# Patient Record
Sex: FEMALE | Race: WHITE | NOT HISPANIC OR LATINO | Employment: OTHER | URBAN - METROPOLITAN AREA
[De-identification: names, ages, dates, MRNs, and addresses within clinical notes are randomized per-mention and may not be internally consistent; named-entity substitution may affect disease eponyms.]

---

## 2017-07-12 ENCOUNTER — APPOINTMENT (OUTPATIENT)
Dept: LAB | Facility: CLINIC | Age: 67
End: 2017-07-12
Payer: COMMERCIAL

## 2017-07-12 DIAGNOSIS — E78.5 HYPERLIPIDEMIA: ICD-10-CM

## 2017-07-12 LAB
ALBUMIN SERPL BCP-MCNC: 4 G/DL (ref 3.5–5)
ALP SERPL-CCNC: 57 U/L (ref 46–116)
ALT SERPL W P-5'-P-CCNC: 19 U/L (ref 12–78)
ANION GAP SERPL CALCULATED.3IONS-SCNC: 6 MMOL/L (ref 4–13)
AST SERPL W P-5'-P-CCNC: 25 U/L (ref 5–45)
BASOPHILS # BLD AUTO: 0.02 THOUSANDS/ΜL (ref 0–0.1)
BASOPHILS NFR BLD AUTO: 0 % (ref 0–1)
BILIRUB SERPL-MCNC: 0.61 MG/DL (ref 0.2–1)
BUN SERPL-MCNC: 14 MG/DL (ref 5–25)
CALCIUM SERPL-MCNC: 9.2 MG/DL (ref 8.3–10.1)
CHLORIDE SERPL-SCNC: 102 MMOL/L (ref 100–108)
CHOLEST SERPL-MCNC: 255 MG/DL (ref 50–200)
CO2 SERPL-SCNC: 30 MMOL/L (ref 21–32)
CREAT SERPL-MCNC: 0.76 MG/DL (ref 0.6–1.3)
EOSINOPHIL # BLD AUTO: 0.31 THOUSAND/ΜL (ref 0–0.61)
EOSINOPHIL NFR BLD AUTO: 7 % (ref 0–6)
ERYTHROCYTE [DISTWIDTH] IN BLOOD BY AUTOMATED COUNT: 13.6 % (ref 11.6–15.1)
GFR SERPL CREATININE-BSD FRML MDRD: >60 ML/MIN/1.73SQ M
GLUCOSE P FAST SERPL-MCNC: 90 MG/DL (ref 65–99)
HCT VFR BLD AUTO: 40.1 % (ref 34.8–46.1)
HDLC SERPL-MCNC: 80 MG/DL (ref 40–60)
HGB BLD-MCNC: 12.8 G/DL (ref 11.5–15.4)
LDLC SERPL CALC-MCNC: 161 MG/DL (ref 0–100)
LYMPHOCYTES # BLD AUTO: 2.05 THOUSANDS/ΜL (ref 0.6–4.47)
LYMPHOCYTES NFR BLD AUTO: 45 % (ref 14–44)
MCH RBC QN AUTO: 27.5 PG (ref 26.8–34.3)
MCHC RBC AUTO-ENTMCNC: 31.9 G/DL (ref 31.4–37.4)
MCV RBC AUTO: 86 FL (ref 82–98)
MONOCYTES # BLD AUTO: 0.42 THOUSAND/ΜL (ref 0.17–1.22)
MONOCYTES NFR BLD AUTO: 9 % (ref 4–12)
NEUTROPHILS # BLD AUTO: 1.77 THOUSANDS/ΜL (ref 1.85–7.62)
NEUTS SEG NFR BLD AUTO: 39 % (ref 43–75)
NRBC BLD AUTO-RTO: 0 /100 WBCS
PLATELET # BLD AUTO: 130 THOUSANDS/UL (ref 149–390)
PMV BLD AUTO: 10.7 FL (ref 8.9–12.7)
POTASSIUM SERPL-SCNC: 3.8 MMOL/L (ref 3.5–5.3)
PROT SERPL-MCNC: 7.4 G/DL (ref 6.4–8.2)
RBC # BLD AUTO: 4.65 MILLION/UL (ref 3.81–5.12)
SODIUM SERPL-SCNC: 138 MMOL/L (ref 136–145)
TRIGL SERPL-MCNC: 72 MG/DL
WBC # BLD AUTO: 4.57 THOUSAND/UL (ref 4.31–10.16)

## 2017-07-12 PROCEDURE — 85025 COMPLETE CBC W/AUTO DIFF WBC: CPT

## 2017-07-12 PROCEDURE — 36415 COLL VENOUS BLD VENIPUNCTURE: CPT

## 2017-07-12 PROCEDURE — 80053 COMPREHEN METABOLIC PANEL: CPT

## 2017-07-12 PROCEDURE — 80061 LIPID PANEL: CPT

## 2017-07-19 ENCOUNTER — GENERIC CONVERSION - ENCOUNTER (OUTPATIENT)
Dept: OTHER | Facility: OTHER | Age: 67
End: 2017-07-19

## 2017-07-20 ENCOUNTER — ALLSCRIPTS OFFICE VISIT (OUTPATIENT)
Dept: OTHER | Facility: OTHER | Age: 67
End: 2017-07-20

## 2017-08-03 DIAGNOSIS — M85.80 OTHER SPECIFIED DISORDERS OF BONE DENSITY AND STRUCTURE, UNSPECIFIED SITE: ICD-10-CM

## 2017-08-14 DIAGNOSIS — I10 ESSENTIAL (PRIMARY) HYPERTENSION: ICD-10-CM

## 2017-08-14 DIAGNOSIS — D69.6 THROMBOCYTOPENIA (HCC): ICD-10-CM

## 2017-08-16 ENCOUNTER — APPOINTMENT (OUTPATIENT)
Dept: LAB | Facility: CLINIC | Age: 67
End: 2017-08-16
Payer: COMMERCIAL

## 2017-08-16 DIAGNOSIS — D69.6 THROMBOCYTOPENIA (HCC): ICD-10-CM

## 2017-08-16 DIAGNOSIS — I10 ESSENTIAL (PRIMARY) HYPERTENSION: ICD-10-CM

## 2017-08-16 LAB
BASOPHILS # BLD AUTO: 0.02 THOUSANDS/ΜL (ref 0–0.1)
BASOPHILS NFR BLD AUTO: 0 % (ref 0–1)
EOSINOPHIL # BLD AUTO: 0.17 THOUSAND/ΜL (ref 0–0.61)
EOSINOPHIL NFR BLD AUTO: 3 % (ref 0–6)
ERYTHROCYTE [DISTWIDTH] IN BLOOD BY AUTOMATED COUNT: 14.1 % (ref 11.6–15.1)
HCT VFR BLD AUTO: 39.5 % (ref 34.8–46.1)
HGB BLD-MCNC: 12.9 G/DL (ref 11.5–15.4)
LYMPHOCYTES # BLD AUTO: 1.69 THOUSANDS/ΜL (ref 0.6–4.47)
LYMPHOCYTES NFR BLD AUTO: 34 % (ref 14–44)
MCH RBC QN AUTO: 28.4 PG (ref 26.8–34.3)
MCHC RBC AUTO-ENTMCNC: 32.7 G/DL (ref 31.4–37.4)
MCV RBC AUTO: 87 FL (ref 82–98)
MONOCYTES # BLD AUTO: 0.37 THOUSAND/ΜL (ref 0.17–1.22)
MONOCYTES NFR BLD AUTO: 8 % (ref 4–12)
NEUTROPHILS # BLD AUTO: 2.69 THOUSANDS/ΜL (ref 1.85–7.62)
NEUTS SEG NFR BLD AUTO: 55 % (ref 43–75)
NRBC BLD AUTO-RTO: 0 /100 WBCS
PLATELET # BLD AUTO: 159 THOUSANDS/UL (ref 149–390)
PMV BLD AUTO: 11.3 FL (ref 8.9–12.7)
RBC # BLD AUTO: 4.54 MILLION/UL (ref 3.81–5.12)
TSH SERPL DL<=0.05 MIU/L-ACNC: 1.34 UIU/ML (ref 0.36–3.74)
WBC # BLD AUTO: 4.94 THOUSAND/UL (ref 4.31–10.16)

## 2017-08-16 PROCEDURE — 87798 DETECT AGENT NOS DNA AMP: CPT

## 2017-08-16 PROCEDURE — 86618 LYME DISEASE ANTIBODY: CPT

## 2017-08-16 PROCEDURE — 84443 ASSAY THYROID STIM HORMONE: CPT

## 2017-08-16 PROCEDURE — 85025 COMPLETE CBC W/AUTO DIFF WBC: CPT

## 2017-08-16 PROCEDURE — 36415 COLL VENOUS BLD VENIPUNCTURE: CPT

## 2017-08-18 LAB
B BURGDOR IGG SER IA-ACNC: 0.78
B BURGDOR IGM SER IA-ACNC: 0.56

## 2017-08-21 LAB — A PHAGOCYTOPH DNA BLD QL NAA+PROBE: NEGATIVE

## 2017-08-29 ENCOUNTER — ALLSCRIPTS OFFICE VISIT (OUTPATIENT)
Dept: OTHER | Facility: OTHER | Age: 67
End: 2017-08-29

## 2017-09-05 ENCOUNTER — HOSPITAL ENCOUNTER (OUTPATIENT)
Dept: MAMMOGRAPHY | Facility: CLINIC | Age: 67
Discharge: HOME | End: 2017-09-05
Payer: COMMERCIAL

## 2017-09-05 DIAGNOSIS — M85.80 OTHER SPECIFIED DISORDERS OF BONE DENSITY AND STRUCTURE, UNSPECIFIED SITE: ICD-10-CM

## 2017-09-05 PROCEDURE — 77080 DXA BONE DENSITY AXIAL: CPT

## 2017-10-24 NOTE — PROGRESS NOTES
Assessment  1  Hypertension (401 9) (I10)   2  History of thrombocytopenia (V12 3) (Z86 2)   3  Hyperlipidemia (272 4) (E78 5)    Plan  Hyperlipidemia    · (1) LIPID PANEL, FASTING; Status:Active; Requested for:01Feb2018;   Hypertension    · (1) BASIC METABOLIC PROFILE; Status:Active; Requested for:01Feb2018;    · (1) CBC/PLT/DIFF; Status:Active; Requested for:01Feb2018;    · (1) HEPATIC FUNCTION PANEL; Status:Active; Requested for:01Feb2018; Discussion/Summary  Discussion Summary:   HTN- Home bp monitor bp's 158/85,  142/82, 121/81 home blood pressure monitor appears to correlate well with office blood pressures and there appears to be some degree of whitecoat hypertension  At this point I think her blood pressures are stable without medication but patient is encouraged to continue with excellent lifestyle with exercise and ideal body weight and monitor home blood pressures and contact me if more than 25% are greater than goal of 140/90  discussed again the current guidelines and given the more optimal blood pressures since last visit I do not feel compelled to treat  We will follow both of these issues up after labs again in 6 months  has resolved, unclear etiology, CBC is completely normal and Lyme and Anaplasma serologies negative, no further workup indicated, we'll repeat the CBC again to assure stability in 6 months  Chief Complaint  Chief Complaint Free Text Note Form: f/u htn      History of Present Illness  HPI: Home bp's 90% at goal, 2 elevated 145/79, 143/81  5d per week w/ 30-45 min routine and 3 walks 2-3 mi ea  Active Problems  1  Advanced directives, counseling/discussion (V65 49) (Z71 89)   2  Changing skin lesion (709 9) (L98 9)   3  Encounter for screening colonoscopy (V76 51) (Z12 11)   4  Encounter for screening for osteoporosis (V82 81) (Z13 820)   5  Hyperlipidemia (272 4) (E78 5)   6  Hypertension (401 9) (I10)   7  Microhematuria (599 72) (R31 29)   8   Need for pneumococcal vaccine (V03 82) (Z23)   9  No advance directives (V49 89) (Z78 9)   10  Osteopenia (733 90) (M85 80)   11  Plantar fasciitis (728 71) (M72 2)   12  Renal cyst (753 10) (N28 1)   13  Screening for cardiovascular condition (V81 2) (Z13 6)   14  Thyroid disorder (246 9) (E07 9)    Past Medical History  1  History of hematuria (V13 09) (Z87 448)   2  History of malignant neoplasm of breast (V10 3) (Z85 3)    Surgical History  1  History of Breast Surgery Lumpectomy   2  History of Excisional Lymph Node Biopsy   3  History of Shaving Of Lesion Mohs' Technique    Family History  Mother    1  Family history of malignant neoplasm of breast (V16 3) (Z80 3)   2  Family history of tuberculosis (V18 8) (Z83 1)  Father    3  Family history of diabetes mellitus (V18 0) (Z83 3)    Social History   · Alcohol use   · Never a smoker   · No advance directives (V49 89) (Z78 9)   · No drug use    Current Meds   1  Calcium 500 MG TABS; Therapy: (Recorded:43Iue3159) to Recorded   2  Multi For Her Oral Capsule; Therapy: (Dean Cutter) to Recorded   3  Progesterone Micronized 10 % Transdermal Cream;   Therapy: (Recorded:61Wiy9244) to Recorded    Allergies  1  No Known Drug Allergies    Vitals  Vital Signs    ** Printed in Appendix #1 below  Physical Exam    Constitutional   General appearance: No acute distress, well appearing and well nourished  Eyes   Conjunctiva and lids: No swelling, erythema or discharge  Pupils and irises: Equal, round and reactive to light  Ears, Nose, Mouth, and Throat   External inspection of ears and nose: Normal     Oropharynx: Normal with no erythema, edema, exudate or lesions  Pulmonary   Respiratory effort: No increased work of breathing or signs of respiratory distress  Auscultation of lungs: Clear to auscultation  Cardiovascular   Auscultation of heart: Normal rate and rhythm, normal S1 and S2, without murmurs      Examination of extremities for edema and/or varicosities: Normal     Carotid pulses: Normal     Neurologic   Cranial nerves: Cranial nerves 2-12 intact  Psychiatric   Orientation to person, place, and time: Normal     Mood and affect: Normal          Results/Data  PHQ-2 Adult Depression Screening 87Nqn1546 03:25PM User, [employer]     Test Name Result Flag Reference   PHQ-2 Adult Depression Score 0     Over the last two weeks, how often have you been bothered by any of the following problems? Little interest or pleasure in doing things: Not at all - 0  Feeling down, depressed, or hopeless: Not at all - 0   PHQ-2 Adult Depression Screening Negative       Falls Risk Assessment (Dx Z13 89 Screen for Neurologic Disorder) 32Cdf9990 03:24PM User, [employer]     Test Name Result Flag Reference   Falls Risk      No falls in the past year     (1) TSH WITH FT4 REFLEX 16Aug2017 11:17AM Dion Asimpatience Pettynicole    Order Number: [id number]     Test Name Result Flag Reference   TSH 1 340 uIU/mL  0 358-3 740   Patients undergoing fluorescein dye angiography may retain small amounts of fluorescein in the body for 48-72 hours post procedure  Samples containing fluorescein can produce falsely depressed TSH values  If the patient had this procedure,a specimen should be resubmitted post fluorescein clearance  The recommended reference ranges for TSH during pregnancy are as follows:  First trimester 0 1 to 2 5 uIU/mL  Second trimester  0 2 to 3 0 uIU/mL  Third trimester 0 3 to 3 0 uIU/m     (1) CBC/PLT/DIFF [id number] 11:17AM [facility] Number: [id number]     Test Name Result Flag Reference   WBC COUNT 4 94 Thousand/uL  4 31-10 16   RBC COUNT 4 54 Million/uL  3 81-5 12   HEMOGLOBIN 12 9 g/dL  11 5-15 4   HEMATOCRIT 39 5 %  34 8-46  1   MCV 87 fL  82-98   MCH 28 4 pg  26 8-34 3   MCHC 32 7 g/dL  31 4-37 4   RDW 14 1 %  11 6-15 1   MPV 11 3 fL  8 9-12 7   PLATELET COUNT 769 Thousands/uL  149-390   nRBC AUTOMATED 0 /100 WBCs     NEUTROPHILS RELATIVE PERCENT 55 %  43-75 LYMPHOCYTES RELATIVE PERCENT 34 %  14-44   MONOCYTES RELATIVE PERCENT 8 %  4-12   EOSINOPHILS RELATIVE PERCENT 3 %  0-6   BASOPHILS RELATIVE PERCENT 0 %  0-1   NEUTROPHILS ABSOLUTE COUNT 2 69 Thousands/? ??L  1 85-7 62   LYMPHOCYTES ABSOLUTE COUNT 1 69 Thousands/? ??L  0 60-4 47   MONOCYTES ABSOLUTE COUNT 0 37 Thousand/? ??L  0 17-1 22   EOSINOPHILS ABSOLUTE COUNT 0 17 Thousand/? ??L  0 00-0 61   BASOPHILS ABSOLUTE COUNT 0 02 Thousands/? ??L  0 00-0 10     (1) LYME ANTIBODY PROFILE W/REFLEX TO WESTERN BLOT [id number] 11:17AM Claudette Ashley     Test Name Result Flag Reference   LYME IGG 0 78  0 00-0 79   NEGATIVE(0 00-0 79)-Absence of detectable Borrelia IgG Antibodies  A negative result does not exclude the possibility of Borrelia infection  If early Lyme disease is suspected,a second sample should be collected & tested 4 weeks after initial testing  LYME IGM 0 56  0 00-0 79   NEGATIVE (0 00-0 79)-Absence of detectable Borrelia IgM antibodies  A negative result does not exclude the possibility of Borrelia infection  If early lyme disease is suspected, a second sample should be collected & tested 4 weeks after initial testing  (1) ANAPLASMA PHAGOCYTOPHILUM, PCR [id number] 11:17AM Meir Ashley Order Number: [id number]     Test Name Result Flag Reference   APHAG Negative  Negative   No Anaplasma phagocytophilum DNA detected  A  phagocytophilum has been  characterized as the causative agent of Human Granulocytic  Ehrlichiosis (HGE)  This test was developed and its performance characteristics  determined by [facility]  It has not been cleared or approved  by the Food and Drug Administration      Performed at:  [address]  [id number]  : [place], Phone:  [phone]     Signatures   Electronically signed by : BEVERLY Mendez ; Aug 29 2017  4:01PM EST                       (Author)    Appendix #1     Vital Signs   Patient: Humberto Feliciano CHANCE; : 1950; MRN: [id number]      Recorded: 2017 03:50PM Recorded: 2017 03:45PM Recorded: 2017 03:23PM   Heart Rate   68   Respiration   12   Systolic 025 472 532   Diastolic 80 94 86   Height   5 ft 4 in   Weight   115 lb 8 oz   BMI Calculated   19 83   BSA Calculated   1 55

## 2017-12-07 ENCOUNTER — HOSPITAL ENCOUNTER (OUTPATIENT)
Dept: ULTRASOUND IMAGING | Facility: CLINIC | Age: 67
Discharge: HOME | End: 2017-12-07
Payer: COMMERCIAL

## 2017-12-07 ENCOUNTER — GENERIC CONVERSION - ENCOUNTER (OUTPATIENT)
Dept: OTHER | Facility: OTHER | Age: 67
End: 2017-12-07

## 2017-12-07 DIAGNOSIS — R31.29 MICROSCOPIC HEMATURIA: ICD-10-CM

## 2017-12-07 PROCEDURE — 76770 US EXAM ABDO BACK WALL COMP: CPT

## 2018-01-11 ENCOUNTER — TRANSCRIBE ORDERS (OUTPATIENT)
Dept: ADMINISTRATIVE | Facility: HOSPITAL | Age: 68
End: 2018-01-11

## 2018-01-11 ENCOUNTER — ALLSCRIPTS OFFICE VISIT (OUTPATIENT)
Dept: OTHER | Facility: OTHER | Age: 68
End: 2018-01-11

## 2018-01-11 DIAGNOSIS — N28.1 ACQUIRED CYST OF KIDNEY: Primary | ICD-10-CM

## 2018-01-12 NOTE — PROGRESS NOTES
Assessment   1  Microhematuria (599 72) (R31 29)   2  Renal cyst (753 10) (N28 1)    Plan   Renal cyst    · Follow-up visit in 6 months Evaluation and Treatment  Follow-up  Status: Complete     Done: [id number]   Ordered; For: Renal cyst; Ordered By: Guicho Rudolph Performed:  Due: 20DJQ7338   · [facility]; Status:Active; Requested for:60Sza1474 10:00AM;    Perform:[facility]; CK:30UKR4784;EQTDVNA; For:Renal cyst; Ordered By:Juana Potts; Discussion/Summary   Discussion Summary:    1  Microhematuria s/p negative workup (12/2016) â managed by Dr Daphne Dee no further workup required at this time  Bosniak 2F renal cyst reviewed with the patient has been slight progression in size  We will continue to observe with a repeat ultrasound of the kidney and bladder in 6 months  If continues to increase in size will need to repeat contrasted CT for re-evaluation  all questions answered  Chief Complaint   Chief Complaint Free Text Note Form: Renal Cyst      History of Present Illness   HPI: Shanna Bang is a 68-year-old female patient of Dr Daphne Dee with a history of microscopic hematuria and left renal cyst presenting for follow-up    had been found to have microscopic hematuria on urinalysis  Renal ultrasound was obtained which revealed a 1 3 cm simple appearing renal cysts  Patient had a CT scan which showed 2 small renal cysts, one simple the other has a thin septations classifying it as a Bosniak 2F  The cystoscopy was performed 12/22/16 which was negative for any abnormalities  presents today for 1 year follow-up with an ultrasound of the kidney and bladder prior to visit  She did have interval mild increase in the size of the left interpolar cyst    denies any flank pain  She does have some lower back pain with radiation into her leg consistent with sciatica  She denies any nausea, vomiting, dysuria, gross hematuria, or weight loss  Patient is overall happy with her urination  is a lifelong nonsmoker  She does have a history of organ confined prostate cancer status post lumpectomy and axillary lymph node dissection in 1993 at [facility]  Review of Systems   Complete-Female Urology:      Constitutional: No fever, no chills, feels well, no tiredness, no recent weight gain or weight loss  Respiratory: No complaints of shortness of breath, no wheezing, no cough, no SOB on exertion, no orthopnea, no PND  Cardiovascular: No complaints of slow heart rate, no fast heart rate, no chest pain, no palpitations, no leg claudication, no lower extremity edema  Gastrointestinal: No complaints of abdominal pain, no constipation, no nausea or vomiting, no diarrhea, no bloody stools  Genitourinary: Empty sensation,-- incontinence-- and-- stream quality good, but-- no dysuria,-- no urinary hesitancy,-- no feelings of urinary urgency-- and-- no hematuria--       The patient presents with complaints of nocturia (1x)  Musculoskeletal: No complaints of arthralgias, no myalgias, no joint swelling or stiffness, no limb pain or swelling  Integumentary: No complaints of skin rash or lesions, no itching, no skin wounds, no breast pain or lump  Hematologic/Lymphatic: No complaints of swollen glands, no swollen glands in the neck, does not bleed easily, does not bruise easily  Neurological: No complaints of headache, no confusion, no convulsions, no numbness, no dizziness or fainting, no tingling, no limb weakness, no difficulty walking  ROS Reviewed:    ROS reviewed  Active Problems   1  Advanced directives, counseling/discussion (V65 49) (Z71 89)   2  Changing skin lesion (709 9) (L98 9)   3  Encounter for screening colonoscopy (V76 51) (Z12 11)   4  Encounter for screening for osteoporosis (V82 81) (Z13 820)   5  Hyperlipidemia (272 4) (E78 5)   6  Hypertension (401 9) (I10)   7  Microhematuria (599 72) (R31 29)   8   Need for pneumococcal vaccine (V03 82) (Z23)   9  No advance directives (V49 89) (Z78 9)   10  Osteopenia (733 90) (M85 80)   11  Plantar fasciitis (728 71) (M72 2)   12  Renal cyst (753 10) (N28 1)   13  Screening for cardiovascular condition (V81 2) (Z13 6)   14  Thyroid disorder (246 9) (E07 9)    Past Medical History   1  History of hematuria (V13 09) (Z87 448)   2  History of malignant neoplasm of breast (V10 3) (Z85 3)  Active Problems And Past Medical History Reviewed: The active problems and past medical history were reviewed and updated today  Surgical History   1  History of Breast Surgery Lumpectomy   2  History of Excisional Lymph Node Biopsy   3  History of Shaving Of Lesion Mohs' Technique  Surgical History Reviewed: The surgical history was reviewed and updated today  Family History   Mother    1  Family history of malignant neoplasm of breast (V16 3) (Z80 3)   2  Family history of tuberculosis (V18 8) (Z83 1)  Father    3  Family history of diabetes mellitus (V18 0) (Z83 3)  Family History Reviewed: The family history was reviewed and updated today  Social History    · Alcohol use   · Never a smoker   · No advance directives (V49 89) (Z78 9)   · No drug use  Social History Reviewed: The social history was reviewed and is unchanged  Current Meds    1  Calcium 500 MG TABS; Therapy: (Recorded:96Ndk1837) to Recorded   2  Multi For Her Oral Capsule; Therapy: (Birtha Marlen) to Recorded   3  Progesterone Micronized 10 % Transdermal Cream;     Therapy: (Recorded:27Ayp5402) to Recorded  Medication List Reviewed: The medication list was reviewed and updated today  Allergies   1   No Known Drug Allergies    Vitals   Vital Signs    Recorded: 71OIQ8987 09:24AM   Heart Rate 68   Systolic 625   Diastolic 80   Height 5 ft 4 in   Weight 119 lb    BMI Calculated 20 43   BSA Calculated 1 57     Physical Exam        Constitutional      General appearance: No acute distress, well appearing and well nourished  Pulmonary      Respiratory effort: No increased work of breathing or signs of respiratory distress  Cardiovascular      Examination of extremities for edema and/or varicosities: Normal        Abdomen no CVA tenderness  Musculoskeletal      Gait and station: Normal        Skin      Skin and subcutaneous tissue: Normal without rashes or lesions  Additional Exam:  no focal neurologic deficits  Mood and affect appropriate  Results/Data   US KIDNEY AND BLADDER 56BCU3393 08:52AM Chevis Dire      Test Name Result Flag Reference   US KIDNEY AND BLADDER (Report)     RENAL ULTRASOUND           INDICATION: 23-year-old female for follow-up  Patient has diagnosis of microscopic hematuria  COMPARISON: None  TECHNIQUE:  Ultrasound of the retroperitoneum was performed with a curvilinear transducer utilizing volumetric sweeps and still imaging techniques  FINDINGS:           KIDNEYS:      Symmetric and normal size  Right kidney: 10 3 x 3 4 cm with cortical thickness of 0 8 cm  Normal echogenicity and contour  No suspicious masses detected  No hydronephrosis  No shadowing calculi  No perinephric fluid collections  Left kidney: 10 x 4 cm with cortical thickness of 0 9 cm  Normal echogenicity and contour  No suspicious masses detected  Anechoic nonvascular 1 4 x 1 5 x 1 4 cm interpolar cyst       No hydronephrosis  No shadowing calculi  No perinephric fluid collections  URETERS:      Nonvisualized  BLADDER:       Normally distended  No focal thickening or mass lesions  Ureteral jets are not identified  IMPRESSION:           No acute abnormality   Interval mild increase in size of left interpolar cyst                  Workstation performed: [id number]           Signed by:      Owen Dempsey MD      12/9/17      Future Appointments      Date/Time Provider Specialty Site   02/13/2018 09:30 AM BEVERLY Giles   Internal Medicine [facility]     Signatures    Electronically signed by : Griselda Araujo, ; Jan 11 2018 12:44PM EST                       (Author)     Electronically signed by : Pb Newton MD; Jan 11 2018  1:00PM EST                       (Author)

## 2018-01-14 VITALS
HEIGHT: 64 IN | HEART RATE: 68 BPM | WEIGHT: 117 LBS | DIASTOLIC BLOOD PRESSURE: 90 MMHG | OXYGEN SATURATION: 98 % | SYSTOLIC BLOOD PRESSURE: 150 MMHG | BODY MASS INDEX: 19.97 KG/M2

## 2018-01-14 VITALS
BODY MASS INDEX: 19.72 KG/M2 | WEIGHT: 115.5 LBS | RESPIRATION RATE: 12 BRPM | SYSTOLIC BLOOD PRESSURE: 122 MMHG | HEART RATE: 68 BPM | DIASTOLIC BLOOD PRESSURE: 80 MMHG | HEIGHT: 64 IN

## 2018-01-23 VITALS
SYSTOLIC BLOOD PRESSURE: 136 MMHG | HEIGHT: 64 IN | DIASTOLIC BLOOD PRESSURE: 80 MMHG | WEIGHT: 119 LBS | BODY MASS INDEX: 20.32 KG/M2 | HEART RATE: 68 BPM

## 2018-02-01 DIAGNOSIS — I10 ESSENTIAL (PRIMARY) HYPERTENSION: ICD-10-CM

## 2018-02-01 DIAGNOSIS — E78.5 HYPERLIPIDEMIA: ICD-10-CM

## 2018-02-07 ENCOUNTER — APPOINTMENT (OUTPATIENT)
Dept: LAB | Facility: CLINIC | Age: 68
End: 2018-02-07
Payer: COMMERCIAL

## 2018-02-07 DIAGNOSIS — E78.5 HYPERLIPIDEMIA: ICD-10-CM

## 2018-02-07 DIAGNOSIS — I10 ESSENTIAL (PRIMARY) HYPERTENSION: ICD-10-CM

## 2018-02-07 LAB
ALBUMIN SERPL BCP-MCNC: 3.8 G/DL (ref 3.5–5)
ALP SERPL-CCNC: 59 U/L (ref 46–116)
ALT SERPL W P-5'-P-CCNC: 19 U/L (ref 12–78)
ANION GAP SERPL CALCULATED.3IONS-SCNC: 3 MMOL/L (ref 4–13)
AST SERPL W P-5'-P-CCNC: 24 U/L (ref 5–45)
BASOPHILS # BLD AUTO: 0.02 THOUSANDS/ΜL (ref 0–0.1)
BASOPHILS NFR BLD AUTO: 0 % (ref 0–1)
BILIRUB DIRECT SERPL-MCNC: 0.1 MG/DL (ref 0–0.2)
BILIRUB SERPL-MCNC: 0.38 MG/DL (ref 0.2–1)
BUN SERPL-MCNC: 10 MG/DL (ref 5–25)
CALCIUM SERPL-MCNC: 9 MG/DL (ref 8.3–10.1)
CHLORIDE SERPL-SCNC: 102 MMOL/L (ref 100–108)
CHOLEST SERPL-MCNC: 243 MG/DL (ref 50–200)
CO2 SERPL-SCNC: 33 MMOL/L (ref 21–32)
CREAT SERPL-MCNC: 0.63 MG/DL (ref 0.6–1.3)
EOSINOPHIL # BLD AUTO: 0.17 THOUSAND/ΜL (ref 0–0.61)
EOSINOPHIL NFR BLD AUTO: 4 % (ref 0–6)
ERYTHROCYTE [DISTWIDTH] IN BLOOD BY AUTOMATED COUNT: 13.2 % (ref 11.6–15.1)
GFR SERPL CREATININE-BSD FRML MDRD: 92 ML/MIN/1.73SQ M
GLUCOSE P FAST SERPL-MCNC: 100 MG/DL (ref 65–99)
HCT VFR BLD AUTO: 40.4 % (ref 34.8–46.1)
HDLC SERPL-MCNC: 78 MG/DL (ref 40–60)
HGB BLD-MCNC: 13.3 G/DL (ref 11.5–15.4)
LDLC SERPL CALC-MCNC: 149 MG/DL (ref 0–100)
LYMPHOCYTES # BLD AUTO: 2.12 THOUSANDS/ΜL (ref 0.6–4.47)
LYMPHOCYTES NFR BLD AUTO: 46 % (ref 14–44)
MCH RBC QN AUTO: 28.7 PG (ref 26.8–34.3)
MCHC RBC AUTO-ENTMCNC: 32.9 G/DL (ref 31.4–37.4)
MCV RBC AUTO: 87 FL (ref 82–98)
MONOCYTES # BLD AUTO: 0.39 THOUSAND/ΜL (ref 0.17–1.22)
MONOCYTES NFR BLD AUTO: 9 % (ref 4–12)
NEUTROPHILS # BLD AUTO: 1.86 THOUSANDS/ΜL (ref 1.85–7.62)
NEUTS SEG NFR BLD AUTO: 41 % (ref 43–75)
NRBC BLD AUTO-RTO: 0 /100 WBCS
PLATELET # BLD AUTO: 184 THOUSANDS/UL (ref 149–390)
PMV BLD AUTO: 11 FL (ref 8.9–12.7)
POTASSIUM SERPL-SCNC: 3.6 MMOL/L (ref 3.5–5.3)
PROT SERPL-MCNC: 7.4 G/DL (ref 6.4–8.2)
RBC # BLD AUTO: 4.63 MILLION/UL (ref 3.81–5.12)
SODIUM SERPL-SCNC: 138 MMOL/L (ref 136–145)
TRIGL SERPL-MCNC: 81 MG/DL
WBC # BLD AUTO: 4.56 THOUSAND/UL (ref 4.31–10.16)

## 2018-02-07 PROCEDURE — 80048 BASIC METABOLIC PNL TOTAL CA: CPT

## 2018-02-07 PROCEDURE — 36415 COLL VENOUS BLD VENIPUNCTURE: CPT

## 2018-02-07 PROCEDURE — 80076 HEPATIC FUNCTION PANEL: CPT

## 2018-02-07 PROCEDURE — 80061 LIPID PANEL: CPT

## 2018-02-07 PROCEDURE — 85025 COMPLETE CBC W/AUTO DIFF WBC: CPT

## 2018-02-15 ENCOUNTER — OFFICE VISIT (OUTPATIENT)
Dept: INTERNAL MEDICINE CLINIC | Facility: CLINIC | Age: 68
End: 2018-02-15
Payer: COMMERCIAL

## 2018-02-15 VITALS
HEIGHT: 64 IN | HEART RATE: 70 BPM | DIASTOLIC BLOOD PRESSURE: 84 MMHG | WEIGHT: 115.6 LBS | OXYGEN SATURATION: 98 % | SYSTOLIC BLOOD PRESSURE: 128 MMHG | BODY MASS INDEX: 19.74 KG/M2

## 2018-02-15 DIAGNOSIS — Z85.3 PERSONAL HISTORY OF BREAST CANCER: ICD-10-CM

## 2018-02-15 DIAGNOSIS — M85.80 OSTEOPENIA, UNSPECIFIED LOCATION: Primary | ICD-10-CM

## 2018-02-15 DIAGNOSIS — E78.5 HYPERLIPIDEMIA, UNSPECIFIED HYPERLIPIDEMIA TYPE: ICD-10-CM

## 2018-02-15 DIAGNOSIS — I10 HYPERTENSION, UNSPECIFIED TYPE: ICD-10-CM

## 2018-02-15 DIAGNOSIS — R31.29 MICROHEMATURIA: ICD-10-CM

## 2018-02-15 DIAGNOSIS — M54.31 SCIATICA OF RIGHT SIDE: ICD-10-CM

## 2018-02-15 PROCEDURE — 3725F SCREEN DEPRESSION PERFORMED: CPT | Performed by: INTERNAL MEDICINE

## 2018-02-15 PROCEDURE — 99214 OFFICE O/P EST MOD 30 MIN: CPT | Performed by: INTERNAL MEDICINE

## 2018-02-15 PROCEDURE — 3074F SYST BP LT 130 MM HG: CPT | Performed by: INTERNAL MEDICINE

## 2018-02-15 PROCEDURE — 1101F PT FALLS ASSESS-DOCD LE1/YR: CPT | Performed by: INTERNAL MEDICINE

## 2018-02-15 PROCEDURE — 3079F DIAST BP 80-89 MM HG: CPT | Performed by: INTERNAL MEDICINE

## 2018-02-15 RX ORDER — MULTIVITAMIN
TABLET ORAL
COMMUNITY
End: 2021-06-04 | Stop reason: HOSPADM

## 2018-02-15 RX ORDER — CALCIUM CARBONATE 500(1250)
TABLET ORAL
COMMUNITY

## 2018-02-15 NOTE — PATIENT INSTRUCTIONS
Lab data reviewed in detail and compared prior     Blood pressure remained stable without medication, patient encouraged to monitor home blood pressures more frequently, notify me if consistently running greater than 140/90, take blood pressures as directed, after resting 3-5 minutes test 3 times 1 minutes apart  Hyperlipidemia -10 year atherosclerotic risk calculated at 9 0 1%, we discussed available guidelines, will hold off on statin treatment at this time, continue with lifestyle modification    Renal cysts and history of microscopic hematuria following with Urology     recent sciatica has resolved -monitor symptoms, contact me for recurrence that fails conservative measures      Osteopenia- bone density from September reviewed, follow-up in 2-3 years, no indication for pharmacotherapy other than calcium and vitamin-D supplementation as discussed    Routine follow-up after labs in 6 months, sooner as needed

## 2018-02-15 NOTE — PROGRESS NOTES
Assessment/Plan:    No problem-specific Assessment & Plan notes found for this encounter  Diagnoses and all orders for this visit:    Osteopenia, unspecified location    Hypertension, unspecified type  -     CBC; Future  -     Comprehensive metabolic panel; Future    Microhematuria    Hyperlipidemia, unspecified hyperlipidemia type  -     Lipid panel; Future  -     TSH, 3rd generation with T4 reflex; Future    Personal history of breast cancer    Sciatica of right side    Other orders  -     Multiple Vitamin (MULTI-VITAMIN DAILY) TABS; Take by mouth  -     Progesterone Micronized 10 % CREA; Place on the skin  -     Calcium 500 MG tablet; Take by mouth        Patient Instructions   Lab data reviewed in detail and compared prior     Blood pressure remained stable without medication, patient encouraged to monitor home blood pressures more frequently, notify me if consistently running greater than 140/90, take blood pressures as directed, after resting 3-5 minutes test 3 times 1 minutes apart  Hyperlipidemia -10 year atherosclerotic risk calculated at 9 0 1%, we discussed available guidelines, will hold off on statin treatment at this time, continue with lifestyle modification    Renal cysts and history of microscopic hematuria following with Urology     recent sciatica has resolved -monitor symptoms, contact me for recurrence that fails conservative measures  Osteopenia- bone density from September reviewed, follow-up in 2-3 years, no indication for pharmacotherapy other than calcium and vitamin-D supplementation as discussed    Routine follow-up after labs in 6 months, sooner as needed      Subjective: f/u mmp and review labs     Patient ID: Randa Jimenez is a 75 y o  female  Feeling generally well, but upset b/c neighbor commit suicide last night    Had episode of R sided sciatica in Dec, pain down R ant thigh, resolved completely after 2 wks, but had residual r leg weakness for a few weeks that has now normalized  She was unable to lift leg to climb stairs  There was no injury or known precipitating activity  1 episode prior  Intermittent tinnitis in R ear come and goes every few mos  No hearing loss or dizzyness  Exercising sporadically d/t weather and sciatica  HTN-no home bp's recently    HPL-watching diet closely  Hypertension   Pertinent negatives include no chest pain, headaches, palpitations or shortness of breath  The following portions of the patient's history were reviewed and updated as appropriate: allergies, current medications, past family history, past medical history, past social history, past surgical history and problem list     Review of Systems   Constitutional: Negative for appetite change, chills, diaphoresis, fatigue, fever and unexpected weight change  HENT: Negative for congestion, hearing loss and rhinorrhea  Eyes: Negative for visual disturbance  Respiratory: Negative for cough, chest tightness, shortness of breath and wheezing  Cardiovascular: Negative for chest pain, palpitations and leg swelling  Gastrointestinal: Negative for abdominal pain and blood in stool  Endocrine: Negative for cold intolerance, heat intolerance, polydipsia and polyuria  Genitourinary: Negative for difficulty urinating, dysuria, frequency and urgency  Musculoskeletal: Negative for arthralgias and myalgias  Skin: Negative for rash  Neurological: Negative for dizziness, weakness, light-headedness and headaches  Hematological: Does not bruise/bleed easily  Psychiatric/Behavioral: Negative for dysphoric mood and sleep disturbance  Objective:    Vitals:    02/15/18 1311   BP: 128/84   Pulse:    SpO2:         Physical Exam   Constitutional: She is oriented to person, place, and time  She appears well-developed and well-nourished  HENT:   Head: Normocephalic and atraumatic     Nose: Nose normal    Mouth/Throat: Oropharynx is clear and moist    Eyes: Conjunctivae and EOM are normal  Pupils are equal, round, and reactive to light  No scleral icterus  Neck: Normal range of motion  Neck supple  No JVD present  No tracheal deviation present  No thyromegaly present  Cardiovascular: Normal rate, regular rhythm and intact distal pulses  Exam reveals no gallop and no friction rub  No murmur heard  Pulmonary/Chest: Effort normal and breath sounds normal  No respiratory distress  She has no wheezes  She has no rales  Abdominal: Soft  Bowel sounds are normal    Musculoskeletal: She exhibits no edema or tenderness  Lymphadenopathy:     She has no cervical adenopathy  Neurological: She is alert and oriented to person, place, and time  No cranial nerve deficit  Skin: Skin is warm and dry  No rash noted  No erythema  Psychiatric: She has a normal mood and affect   Her behavior is normal  Judgment and thought content normal

## 2018-07-03 ENCOUNTER — TRANSCRIBE ORDERS (OUTPATIENT)
Dept: RADIOLOGY | Facility: CLINIC | Age: 68
End: 2018-07-03

## 2018-07-10 ENCOUNTER — HOSPITAL ENCOUNTER (OUTPATIENT)
Dept: ULTRASOUND IMAGING | Facility: CLINIC | Age: 68
Discharge: HOME | End: 2018-07-10
Payer: COMMERCIAL

## 2018-07-10 DIAGNOSIS — N28.1 ACQUIRED CYST OF KIDNEY: ICD-10-CM

## 2018-07-10 PROCEDURE — 76770 US EXAM ABDO BACK WALL COMP: CPT

## 2018-07-18 ENCOUNTER — OFFICE VISIT (OUTPATIENT)
Dept: UROLOGY | Facility: CLINIC | Age: 68
End: 2018-07-18
Payer: COMMERCIAL

## 2018-07-18 VITALS
DIASTOLIC BLOOD PRESSURE: 80 MMHG | WEIGHT: 112 LBS | HEIGHT: 60 IN | HEART RATE: 68 BPM | SYSTOLIC BLOOD PRESSURE: 126 MMHG | BODY MASS INDEX: 21.99 KG/M2

## 2018-07-18 DIAGNOSIS — R31.29 MICROHEMATURIA: Primary | ICD-10-CM

## 2018-07-18 DIAGNOSIS — N28.1 RENAL CYST: ICD-10-CM

## 2018-07-18 LAB
BACTERIA UR QL AUTO: NORMAL /HPF
BILIRUB UR QL STRIP: NEGATIVE
CLARITY UR: CLEAR
COLOR UR: YELLOW
GLUCOSE UR STRIP-MCNC: NEGATIVE MG/DL
HGB UR QL STRIP.AUTO: NEGATIVE
HYALINE CASTS #/AREA URNS LPF: NORMAL /LPF
KETONES UR STRIP-MCNC: NEGATIVE MG/DL
LEUKOCYTE ESTERASE UR QL STRIP: NEGATIVE
NITRITE UR QL STRIP: NEGATIVE
NON-SQ EPI CELLS URNS QL MICRO: NORMAL /HPF
PH UR STRIP.AUTO: 7.5 [PH] (ref 4.5–8)
PROT UR STRIP-MCNC: NEGATIVE MG/DL
RBC #/AREA URNS AUTO: NORMAL /HPF
SL AMB  POCT GLUCOSE, UA: NORMAL
SL AMB LEUKOCYTE ESTERASE,UA: NORMAL
SL AMB POCT BILIRUBIN,UA: NORMAL
SL AMB POCT BLOOD,UA: NORMAL
SL AMB POCT CLARITY,UA: CLEAR
SL AMB POCT COLOR,UA: YELLOW
SL AMB POCT KETONES,UA: NORMAL
SL AMB POCT NITRITE,UA: NORMAL
SL AMB POCT PH,UA: 6
SL AMB POCT SPECIFIC GRAVITY,UA: 1.01
SL AMB POCT URINE PROTEIN: NORMAL
SL AMB POCT UROBILINOGEN: NORMAL
SP GR UR STRIP.AUTO: 1.01 (ref 1–1.03)
UROBILINOGEN UR QL STRIP.AUTO: 0.2 E.U./DL
WBC #/AREA URNS AUTO: NORMAL /HPF

## 2018-07-18 PROCEDURE — 99213 OFFICE O/P EST LOW 20 MIN: CPT | Performed by: PHYSICIAN ASSISTANT

## 2018-07-18 PROCEDURE — 81001 URINALYSIS AUTO W/SCOPE: CPT | Performed by: PHYSICIAN ASSISTANT

## 2018-07-18 PROCEDURE — 81002 URINALYSIS NONAUTO W/O SCOPE: CPT | Performed by: PHYSICIAN ASSISTANT

## 2018-07-18 NOTE — PROGRESS NOTES
1  Microhematuria  POCT urine dip    US kidney and bladder    Urinalysis with microscopic   2  Renal cyst  US kidney and bladder    Urinalysis with microscopic       Assessment and plan:       1  Microhematuria  -- managed by Dr Sherry Bermeo   - s/p negative workup (12/2016)  - patient will follow up in 1 year with a UA with micro prior to visit      2  Bosniak 2F renal cyst   - I was happy to review with the patient that her most recent ultrasound revealed stability of her cyst     - Patient will follow up in 1 year with an ultrasound of the kidney and bladder for continued surveillance  - she is aware to contact us in the interim with any flank pain or gross hematuria  - All questions answered  Darby Noel PA-C      Chief Complaint      Follow-up renal cyst    History of Present Illness     Shelton Lees is a 75 y o  female patient of Dr Sherry Bermeo with a history of microscopic hematuria and left renal cyst presenting for follow-up  Patient had been found to have microscopic hematuria on urinalysis  Renal ultrasound was obtained which revealed a 1 3 cm simple appearing renal cysts  Patient had a CT scan which showed 2 small renal cysts, one simple the other has a thin septations classifying it as a Bosniak 2F  The cystoscopy was performed 12/22/16 which was negative for any abnormalities  Patient was last seen in January 2018  Ultrasound of the kidney and bladder did reveal mild increase size of the left interpolar cyst   Patient was asymptomatic at that time  Fortunately on her most recent ultrasound of the kidney and bladder 07/2018 this revealed stability and left cyst     Patient is a lifelong nonsmoker  She does have a history of organ confined breastcancer status post lumpectomy and axillary lymph node dissection in 1993 at [facility]  Patient states that urination remains stable  Denies any dysuria, gross hematuria, suprapubic pressure, flank pain    Denies any urinary tract infections  Denies any changes in overall health  Denies any weight loss or bone pain  Laboratory     Lab Results   Component Value Date    CREATININE 0 63 02/07/2018       Review of Systems     Review of Systems   Constitutional: Negative for activity change, appetite change, chills, diaphoresis, fatigue, fever and unexpected weight change  Respiratory: Negative for chest tightness and shortness of breath  Cardiovascular: Negative for chest pain, palpitations and leg swelling  Gastrointestinal: Negative for abdominal distention, abdominal pain, constipation, diarrhea, nausea and vomiting  Genitourinary: Negative for decreased urine volume, difficulty urinating, dysuria, enuresis, flank pain, frequency, genital sores, hematuria and urgency  Musculoskeletal: Negative for back pain, gait problem and myalgias  Skin: Negative for color change, pallor, rash and wound  Psychiatric/Behavioral: Negative for behavioral problems  The patient is not nervous/anxious  Allergies     No Known Allergies    Physical Exam     Physical Exam   Constitutional: She is oriented to person, place, and time  She appears well-developed and well-nourished  No distress  HENT:   Head: Normocephalic and atraumatic  Eyes: Conjunctivae are normal    Neck: Normal range of motion  No tracheal deviation present  Pulmonary/Chest: Effort normal    Abdominal: Soft  She exhibits no distension and no mass  There is no tenderness  There is no rebound and no guarding  No CVA tenderness   Musculoskeletal: Normal range of motion  She exhibits no edema or deformity  Neurological: She is alert and oriented to person, place, and time  Skin: Skin is warm and dry  She is not diaphoretic  No erythema  No pallor  Psychiatric: She has a normal mood and affect   Her behavior is normal          Vital Signs     Vitals:    07/18/18 1017   BP: 126/80   Pulse: 68   Weight: 50 8 kg (112 lb)   Height: 5' (1 524 m)         Current Medications       Current Outpatient Prescriptions:     Calcium 500 MG tablet, Take by mouth, Disp: , Rfl:     Multiple Vitamin (MULTI-VITAMIN DAILY) TABS, Take by mouth, Disp: , Rfl:     Progesterone Micronized 10 % CREA, Place on the skin, Disp: , Rfl:       Active Problems     Patient Active Problem List   Diagnosis    Hyperlipidemia    Hypertension    Microhematuria    Osteopenia    Personal history of breast cancer    Renal cyst         Past Medical History     Past Medical History:   Diagnosis Date    Breast [facility] ([facility] [address]     Hematuria     LAST ASSESSED [id number]         Surgical History     Past Surgical History:   Procedure Laterality Date    BREAST LUMPECTOMY      LYMPH NODE BIOPSY      MOHS SURGERY           Family History     No family history on file  Social History     Social History       Radiology   RENAL ULTRASOUND     INDICATION:   N28 1: Cyst of kidney, acquired      COMPARISON: 12/7/2017     TECHNIQUE:   Ultrasound of the retroperitoneum was performed with a curvilinear transducer utilizing volumetric sweeps and still imaging techniques       FINDINGS:     KIDNEYS:  Symmetric and normal size  Right kidney:  11 x 4 1 cm  Left kidney:  10 3 x 4 1 cm      Right kidney  Normal echogenicity and contour  No suspicious masses detected  No hydronephrosis  No shadowing calculi  No perinephric fluid collections      Left kidney  Normal echogenicity and contour  No suspicious masses detected  Stable simple appearing mid renal cyst measuring 1 5 x 1 5 x 1 4 cm  Prior measurement 1 4 x 1 5 x 1 4 cm  No hydronephrosis  No shadowing calculi  No perinephric fluid collections      URETERS:  Nonvisualized      BLADDER:   Normally distended  No focal thickening or mass lesions    Bilateral ureteral jets detected         IMPRESSION:  Stable simple left renal cyst

## 2018-08-13 ENCOUNTER — APPOINTMENT (OUTPATIENT)
Dept: LAB | Facility: CLINIC | Age: 68
End: 2018-08-13
Payer: COMMERCIAL

## 2018-08-13 DIAGNOSIS — I10 HYPERTENSION, UNSPECIFIED TYPE: ICD-10-CM

## 2018-08-13 DIAGNOSIS — E78.5 HYPERLIPIDEMIA, UNSPECIFIED HYPERLIPIDEMIA TYPE: ICD-10-CM

## 2018-08-13 LAB
ALBUMIN SERPL BCP-MCNC: 3.8 G/DL (ref 3.5–5)
ALP SERPL-CCNC: 53 U/L (ref 46–116)
ALT SERPL W P-5'-P-CCNC: 18 U/L (ref 12–78)
ANION GAP SERPL CALCULATED.3IONS-SCNC: 4 MMOL/L (ref 4–13)
AST SERPL W P-5'-P-CCNC: 22 U/L (ref 5–45)
BILIRUB SERPL-MCNC: 0.49 MG/DL (ref 0.2–1)
BUN SERPL-MCNC: 12 MG/DL (ref 5–25)
CALCIUM SERPL-MCNC: 8.9 MG/DL (ref 8.3–10.1)
CHLORIDE SERPL-SCNC: 106 MMOL/L (ref 100–108)
CHOLEST SERPL-MCNC: 219 MG/DL (ref 50–200)
CO2 SERPL-SCNC: 30 MMOL/L (ref 21–32)
CREAT SERPL-MCNC: 0.67 MG/DL (ref 0.6–1.3)
ERYTHROCYTE [DISTWIDTH] IN BLOOD BY AUTOMATED COUNT: 15 % (ref 11.6–15.1)
GFR SERPL CREATININE-BSD FRML MDRD: 91 ML/MIN/1.73SQ M
GLUCOSE P FAST SERPL-MCNC: 85 MG/DL (ref 65–99)
HCT VFR BLD AUTO: 40.1 % (ref 34.8–46.1)
HDLC SERPL-MCNC: 70 MG/DL (ref 40–60)
HGB BLD-MCNC: 12.5 G/DL (ref 11.5–15.4)
LDLC SERPL CALC-MCNC: 136 MG/DL (ref 0–100)
MCH RBC QN AUTO: 27.9 PG (ref 26.8–34.3)
MCHC RBC AUTO-ENTMCNC: 31.2 G/DL (ref 31.4–37.4)
MCV RBC AUTO: 90 FL (ref 82–98)
NONHDLC SERPL-MCNC: 149 MG/DL
PLATELET # BLD AUTO: 173 THOUSANDS/UL (ref 149–390)
PMV BLD AUTO: 10.9 FL (ref 8.9–12.7)
POTASSIUM SERPL-SCNC: 3.9 MMOL/L (ref 3.5–5.3)
PROT SERPL-MCNC: 7.1 G/DL (ref 6.4–8.2)
RBC # BLD AUTO: 4.48 MILLION/UL (ref 3.81–5.12)
SODIUM SERPL-SCNC: 140 MMOL/L (ref 136–145)
TRIGL SERPL-MCNC: 66 MG/DL
TSH SERPL DL<=0.05 MIU/L-ACNC: 1.82 UIU/ML (ref 0.36–3.74)
WBC # BLD AUTO: 4.07 THOUSAND/UL (ref 4.31–10.16)

## 2018-08-13 PROCEDURE — 84443 ASSAY THYROID STIM HORMONE: CPT

## 2018-08-13 PROCEDURE — 36415 COLL VENOUS BLD VENIPUNCTURE: CPT

## 2018-08-13 PROCEDURE — 80053 COMPREHEN METABOLIC PANEL: CPT

## 2018-08-13 PROCEDURE — 85027 COMPLETE CBC AUTOMATED: CPT

## 2018-08-13 PROCEDURE — 80061 LIPID PANEL: CPT

## 2018-08-21 ENCOUNTER — OFFICE VISIT (OUTPATIENT)
Dept: INTERNAL MEDICINE CLINIC | Facility: CLINIC | Age: 68
End: 2018-08-21
Payer: COMMERCIAL

## 2018-08-21 VITALS
DIASTOLIC BLOOD PRESSURE: 86 MMHG | HEART RATE: 68 BPM | SYSTOLIC BLOOD PRESSURE: 144 MMHG | RESPIRATION RATE: 12 BRPM | BODY MASS INDEX: 22.03 KG/M2 | WEIGHT: 112.2 LBS | HEIGHT: 60 IN

## 2018-08-21 DIAGNOSIS — Z85.3 PERSONAL HISTORY OF BREAST CANCER: ICD-10-CM

## 2018-08-21 DIAGNOSIS — E78.2 MIXED HYPERLIPIDEMIA: ICD-10-CM

## 2018-08-21 DIAGNOSIS — I10 ESSENTIAL HYPERTENSION: Primary | ICD-10-CM

## 2018-08-21 DIAGNOSIS — M85.80 OSTEOPENIA, UNSPECIFIED LOCATION: ICD-10-CM

## 2018-08-21 DIAGNOSIS — R31.29 MICROHEMATURIA: ICD-10-CM

## 2018-08-21 DIAGNOSIS — N28.1 RENAL CYST: ICD-10-CM

## 2018-08-21 PROCEDURE — G0439 PPPS, SUBSEQ VISIT: HCPCS | Performed by: INTERNAL MEDICINE

## 2018-08-21 PROCEDURE — 1170F FXNL STATUS ASSESSED: CPT | Performed by: INTERNAL MEDICINE

## 2018-08-21 PROCEDURE — 4040F PNEUMOC VAC/ADMIN/RCVD: CPT | Performed by: INTERNAL MEDICINE

## 2018-08-21 PROCEDURE — 1160F RVW MEDS BY RX/DR IN RCRD: CPT | Performed by: INTERNAL MEDICINE

## 2018-08-21 PROCEDURE — 1036F TOBACCO NON-USER: CPT | Performed by: INTERNAL MEDICINE

## 2018-08-21 PROCEDURE — 99214 OFFICE O/P EST MOD 30 MIN: CPT | Performed by: INTERNAL MEDICINE

## 2018-08-21 PROCEDURE — 1125F AMNT PAIN NOTED PAIN PRSNT: CPT | Performed by: INTERNAL MEDICINE

## 2018-08-21 PROCEDURE — 3008F BODY MASS INDEX DOCD: CPT | Performed by: INTERNAL MEDICINE

## 2018-08-21 NOTE — PROGRESS NOTES
Assessment and Plan:    Problem List Items Addressed This Visit     None        Health Maintenance Due   Topic Date Due    Hepatitis C Screening  1950   [employer] Medicare Annual Wellness Visit (AWV)  1950    MAMMOGRAM  1950    DTaP,Tdap,and Td Vaccines (1 - Tdap) 08/14/1970    GLAUCOMA SCREENING 72 + YR  08/14/2016         HPI:  Socorro Escobar is a 70 y o  female here for her Subsequent Wellness Visit  Patient Active Problem List   Diagnosis    Hyperlipidemia    Hypertension    Microhematuria    Osteopenia    Personal history of breast cancer    Renal cyst     Past Medical History:   Diagnosis Date    Breast cancer ([facility] [address] [facility]     Hematuria     LAST ASSESSED [id number]     Past Surgical History:   Procedure Laterality Date    BREAST LUMPECTOMY      LYMPH NODE BIOPSY      MOHS SURGERY       No family history on file  History   Smoking Status    Never Smoker   Smokeless Tobacco    Never Used     History   Alcohol Use    Yes      History   Drug Use No       Current Outpatient Prescriptions   Medication Sig Dispense Refill    Calcium 500 MG tablet Take by mouth      Multiple Vitamin (MULTI-VITAMIN DAILY) TABS Take by mouth      Progesterone Micronized 10 % CREA Place on the skin       No current facility-administered medications for this visit  No Known Allergies  Immunization History   Administered Date(s) Administered    Influenza TIV (IM) 1950    Pneumococcal Conjugate 13-Valent 12/09/2015    Pneumococcal Polysaccharide PPV23 07/20/2017    Tdap 1950    Zoster 01/01/2012       Patient Care Team:  Alisia Blum MD as PCP - MD Yovanny Erickson MD    Medicare Screening Tests and Risk Assessments:  Rell Ge is here for her Initial Wellness visit  Last Medicare Wellness visit information reviewed, patient interviewed and updates made to the record today  Health Risk Assessment:  Patient rates overall health as excellent   Patient feels that their physical health rating is Same  Eyesight was rated as Same  Hearing was rated as Same  Patient feels that their emotional and mental health rating is Same  Pain experienced by patient in the last 7 days has been Some  Patient's pain rating has been 1/10  Patient states that she has experienced no weight loss or gain in last 6 months  Emotional/Mental Health:  Patient has been feeling nervous/anxious  PHQ-9 Depression Screening:    Frequency of the following problems over the past two weeks:      1  Little interest or pleasure in doing things: 0 - not at all      2  Feeling down, depressed, or hopeless: 0 - not at all  PHQ-2 Score: 0          Broken Bones/Falls: Fall Risk Assessment:    In the past year, patient has experienced: No history of falling in past year          Bladder/Bowel:  Patient has not leaked urine accidently in the last six months  Patient reports no loss of bowel control  Immunizations:  Patient has had a flu vaccination within the last year  Patient has received a pneumonia shot  Patient has received a shingles shot  Patient has not received tetanus/diphtheria shot  Home Safety:  Patient does not have trouble with stairs inside or outside of their home  Patient currently reports that there are safety hazards present in home , working smoke alarms, no working carbon monoxide detectors  Preventative Screenings:   colon cancer screen completed, 2/18/2013  cholesterol screen completed, 8/13/2018  no glaucoma eye exam completed    Nutrition:  Current diet: Regular with servings of the following:    Medications:  Patient is currently taking over-the-counter supplements  List of OTC medications includes: calcium and multi vit     Lifestyle Choices:  Patient reports no tobacco use  Patient has not smoked or used tobacco in the past   Patient reports alcohol use  Alcohol use per week: 7  Patient drives a vehicle  Patient wears seat belt          Activities of Daily Living:  Can get out of bed by his or her self, able to dress self, able to make own meals, able to do own shopping, able to bathe self, can do own laundry/housekeeping, can manage own money, pay bills and track expenses    Previous Hospitalizations:  No hospitalization or ED visit in past 12 months        Advanced Directives:  Patient has not decided on power of   Patient has not completed advanced directive  Preventative Screening/Counseling:      Cardiovascular:      General: Screening Current          Diabetes:      General: Screening Current          Colorectal Cancer:      General: Screening Current          Breast Cancer:      General: Screening Current          Cervical Cancer:      General: Screening Current          Osteoporosis:      General: Screening Current          AAA:      General: Screening Not Indicated          Glaucoma:      General: Screening Current          HIV:      General: Patient Declines and Screening Not Indicated          Hepatitis C:      General: Screening Current        Advanced Directives:   Patient has no living will for healthcare, does not have durable POA for healthcare, patient does not have an advanced directive  Information on ACP and/or AD provided  5 wishes given  End of life assessment reviewed with patient  Provider agrees with end of life decisions

## 2018-08-21 NOTE — PATIENT INSTRUCTIONS
Lab data reviewed in detail and compared prior    Hypertension- blood pressure borderline today, continue with excellent exercise regimen, monitor sodium in diet, monitor home blood pressures more closely, if running consistently greater than 140/90 contact me to consider starting medication otherwise will follow up in 6 months     Hyperlipidemia -dramatic improvement in LDL cholesterol over the last 2 years however 10 year atherosclerotic risk is still calculated at 8 0 8%  We discussed current guidelines and are comfortable continuing off of statins for now  Remote history of breast cancer following closely with yearly mammograms    Right knee swelling without significant pain likely related to osteoarthritis, exam unremarkable    History of microscopic hematuria and renal cyst followed yearly by Urology, workup negative    Osteopenia-continue with weight-bearing exercise, calcium and vitamin-D supplements and recheck bone density next September 2019     Health maintenance-up-to-date with vaccinations, screening colonoscopy, we discussed advanced directives, 5 wishes given and freezer pack discussed     Routine follow-up after labs in 6 months, sooner as needed

## 2018-08-21 NOTE — PROGRESS NOTES
Assessment/Plan:    Patient Instructions   Lab data reviewed in detail and compared prior    Hypertension- blood pressure borderline today, continue with excellent exercise regimen, monitor sodium in diet, monitor home blood pressures more closely, if running consistently greater than 140/90 contact me to consider starting medication otherwise will follow up in 6 months     Hyperlipidemia -dramatic improvement in LDL cholesterol over the last 2 years however 10 year atherosclerotic risk is still calculated at 8 0 8%  We discussed current guidelines and are comfortable continuing off of statins for now  Remote history of breast cancer following closely with yearly mammograms    Right knee swelling without significant pain likely related to osteoarthritis, exam unremarkable    History of microscopic hematuria and renal cyst followed yearly by Urology, workup negative    Osteopenia-continue with weight-bearing exercise, calcium and vitamin-D supplements and recheck bone density next September 2019     Health maintenance-up-to-date with vaccinations, screening colonoscopy, we discussed advanced directives, 5 wishes given and freezer pack discussed     Routine follow-up after labs in 6 months, sooner as needed  Diagnoses and all orders for this visit:    Essential hypertension  -     CBC and differential; Future  -     Comprehensive metabolic panel; Future    Osteopenia, unspecified location    Microhematuria    Renal cyst    Mixed hyperlipidemia  -     Lipid panel; Future    Personal history of breast cancer         Subjective:      Patient ID: Sophia Dickerson is a 70 y o  female    Feeling well  Walking 30 mi per week to train for 60 mi 3 d walk  HTN-no home bp's, it had been stable  HPL-exercising, but no real dietary change  Red meat 3-4 x per week             Current Outpatient Prescriptions:     Calcium 500 MG tablet, Take by mouth, Disp: , Rfl:     Multiple Vitamin (MULTI-VITAMIN DAILY) TABS, Take by mouth, Disp: , Rfl:     Progesterone Micronized 10 % CREA, Place on the skin, Disp: , Rfl:     Recent Results (from the past 1008 hour(s))   POCT urine dip    Collection Time: 07/18/18 10:21 AM   Result Value Ref Range    LEUKOCYTE ESTERASE,UA -      NITRITE,UA -     SL AMB POCT UROBILINOGEN -     SL AMB POCT URINE PROTEIN -      PH,UA 6 0      BLOOD,UA -      SPECIFIC GRAVITY,UA 1 010      KETONES,UA -      BILIRUBIN,UA -     GLUCOSE, UA -      COLOR,UA yellow      CLARITY,UA clear    Urinalysis with microscopic    Collection Time: 07/18/18  4:29 PM   Result Value Ref Range    Clarity, UA Clear     Color, UA Yellow     Specific Gravity, UA 1 008 1 003 - 1 030    pH, UA 7 5 4 5 - 8 0    Glucose, UA Negative Negative mg/dl    Ketones, UA Negative Negative mg/dl    Blood, UA Negative Negative    Protein, UA Negative Negative mg/dl    Nitrite, UA Negative Negative    Bilirubin, UA Negative Negative    Urobilinogen, UA 0 2 0 2, 1 0 E U /dl E U /dl    Leukocytes, UA Negative Negative    WBC, UA None Seen None Seen, 0-5, 5-55, 5-65 /hpf    RBC, UA None Seen None Seen, 0-5 /hpf    Hyaline Casts, UA None Seen None Seen /lpf    Bacteria, UA None Seen None Seen, Occasional /hpf    Epithelial Cells None Seen None Seen, Occasional /hpf   CBC    Collection Time: 08/13/18  7:49 AM   Result Value Ref Range    WBC 4 07 (L) 4 31 - 10 16 Thousand/uL    RBC 4 48 3 81 - 5 12 Million/uL    Hemoglobin 12 5 11 5 - 15 4 g/dL    Hematocrit 40 1 34 8 - 46 1 %    MCV 90 82 - 98 fL    MCH 27 9 26 8 - 34 3 pg    MCHC 31 2 (L) 31 4 - 37 4 g/dL    RDW 15 0 11 6 - 15 1 %    Platelets 689 234 - 152 Thousands/uL    MPV 10 9 8 9 - 12 7 fL   Comprehensive metabolic panel    Collection Time: 08/13/18  7:49 AM   Result Value Ref Range    Sodium 140 136 - 145 mmol/L    Potassium 3 9 3 5 - 5 3 mmol/L    Chloride 106 100 - 108 mmol/L    CO2 30 21 - 32 mmol/L    Anion Gap 4 4 - 13 mmol/L    BUN 12 5 - 25 mg/dL    Creatinine 0 67 0 60 - 1 30 mg/dL Glucose, Fasting 85 65 - 99 mg/dL    Calcium 8 9 8 3 - 10 1 mg/dL    AST 22 5 - 45 U/L    ALT 18 12 - 78 U/L    Alkaline Phosphatase 53 46 - 116 U/L    Total Protein 7 1 6 4 - 8 2 g/dL    Albumin 3 8 3 5 - 5 0 g/dL    Total Bilirubin 0 49 0 20 - 1 00 mg/dL    eGFR 91 ml/min/1 73sq m   Lipid panel    Collection Time: 08/13/18  7:49 AM   Result Value Ref Range    Cholesterol 219 (H) 50 - 200 mg/dL    Triglycerides 66 <=150 mg/dL    HDL, Direct 70 (H) 40 - 60 mg/dL    LDL Calculated 136 (H) 0 - 100 mg/dL    Non-HDL-Chol (CHOL-HDL) 149 mg/dl   TSH, 3rd generation with T4 reflex    Collection Time: 08/13/18  7:49 AM   Result Value Ref Range    TSH 3RD GENERATON 1 820 0 358 - 3 740 uIU/mL       The following portions of the patient's history were reviewed and updated as appropriate: allergies, current medications, past family history, past medical history, past social history, past surgical history and problem list      Review of Systems   Constitutional: Negative for appetite change, chills, diaphoresis, fatigue, fever and unexpected weight change  HENT: Negative for congestion, hearing loss and rhinorrhea  Eyes: Negative for visual disturbance  Respiratory: Negative for cough, chest tightness, shortness of breath and wheezing  Cardiovascular: Negative for chest pain, palpitations and leg swelling  Gastrointestinal: Negative for abdominal pain and blood in stool  Endocrine: Negative for cold intolerance, heat intolerance, polydipsia and polyuria  Genitourinary: Negative for difficulty urinating, dysuria, frequency and urgency  Musculoskeletal: Negative for arthralgias and myalgias  Skin: Negative for rash  Neurological: Negative for dizziness, weakness, light-headedness and headaches  Hematological: Does not bruise/bleed easily  Psychiatric/Behavioral: Negative for dysphoric mood and sleep disturbance           Objective:      Vitals:    08/21/18 1030   BP: 144/86   Pulse:    Resp: Physical Exam   Constitutional: She is oriented to person, place, and time  She appears well-developed and well-nourished  HENT:   Head: Normocephalic and atraumatic  Nose: Nose normal    Mouth/Throat: Oropharynx is clear and moist    Eyes: Conjunctivae and EOM are normal  Pupils are equal, round, and reactive to light  No scleral icterus  Neck: Normal range of motion  Neck supple  No JVD present  No tracheal deviation present  No thyromegaly present  Cardiovascular: Normal rate, regular rhythm and intact distal pulses  Exam reveals no gallop and no friction rub  No murmur heard  Pulmonary/Chest: Effort normal and breath sounds normal  No respiratory distress  She has no wheezes  She has no rales  Abdominal: Soft  Bowel sounds are normal  She exhibits no distension and no mass  There is no tenderness  There is no rebound and no guarding  Musculoskeletal: She exhibits no edema, tenderness or deformity  R knee-No effusion or ligamentous laxity   Lymphadenopathy:     She has no cervical adenopathy  Neurological: She is alert and oriented to person, place, and time  No cranial nerve deficit  Skin: Skin is warm and dry  No rash noted  No erythema  No pallor  Psychiatric: She has a normal mood and affect   Her behavior is normal  Judgment and thought content normal

## 2019-02-14 ENCOUNTER — APPOINTMENT (OUTPATIENT)
Dept: LAB | Facility: CLINIC | Age: 69
End: 2019-02-14
Payer: COMMERCIAL

## 2019-02-14 DIAGNOSIS — E78.2 MIXED HYPERLIPIDEMIA: ICD-10-CM

## 2019-02-14 DIAGNOSIS — I10 ESSENTIAL HYPERTENSION: ICD-10-CM

## 2019-02-14 LAB
ALBUMIN SERPL BCP-MCNC: 4 G/DL (ref 3.5–5)
ALP SERPL-CCNC: 63 U/L (ref 46–116)
ALT SERPL W P-5'-P-CCNC: 18 U/L (ref 12–78)
ANION GAP SERPL CALCULATED.3IONS-SCNC: 5 MMOL/L (ref 4–13)
AST SERPL W P-5'-P-CCNC: 24 U/L (ref 5–45)
BASOPHILS # BLD AUTO: 0.03 THOUSANDS/ΜL (ref 0–0.1)
BASOPHILS NFR BLD AUTO: 1 % (ref 0–1)
BILIRUB SERPL-MCNC: 0.39 MG/DL (ref 0.2–1)
BUN SERPL-MCNC: 8 MG/DL (ref 5–25)
CALCIUM SERPL-MCNC: 8.8 MG/DL (ref 8.3–10.1)
CHLORIDE SERPL-SCNC: 102 MMOL/L (ref 100–108)
CHOLEST SERPL-MCNC: 231 MG/DL (ref 50–200)
CO2 SERPL-SCNC: 30 MMOL/L (ref 21–32)
CREAT SERPL-MCNC: 0.7 MG/DL (ref 0.6–1.3)
EOSINOPHIL # BLD AUTO: 0.17 THOUSAND/ΜL (ref 0–0.61)
EOSINOPHIL NFR BLD AUTO: 4 % (ref 0–6)
ERYTHROCYTE [DISTWIDTH] IN BLOOD BY AUTOMATED COUNT: 13.9 % (ref 11.6–15.1)
GFR SERPL CREATININE-BSD FRML MDRD: 89 ML/MIN/1.73SQ M
GLUCOSE P FAST SERPL-MCNC: 86 MG/DL (ref 65–99)
HCT VFR BLD AUTO: 41.6 % (ref 34.8–46.1)
HDLC SERPL-MCNC: 70 MG/DL (ref 40–60)
HGB BLD-MCNC: 12.9 G/DL (ref 11.5–15.4)
IMM GRANULOCYTES # BLD AUTO: 0 THOUSAND/UL (ref 0–0.2)
IMM GRANULOCYTES NFR BLD AUTO: 0 % (ref 0–2)
LDLC SERPL CALC-MCNC: 147 MG/DL (ref 0–100)
LYMPHOCYTES # BLD AUTO: 1.74 THOUSANDS/ΜL (ref 0.6–4.47)
LYMPHOCYTES NFR BLD AUTO: 42 % (ref 14–44)
MCH RBC QN AUTO: 27.8 PG (ref 26.8–34.3)
MCHC RBC AUTO-ENTMCNC: 31 G/DL (ref 31.4–37.4)
MCV RBC AUTO: 90 FL (ref 82–98)
MONOCYTES # BLD AUTO: 0.43 THOUSAND/ΜL (ref 0.17–1.22)
MONOCYTES NFR BLD AUTO: 11 % (ref 4–12)
NEUTROPHILS # BLD AUTO: 1.71 THOUSANDS/ΜL (ref 1.85–7.62)
NEUTS SEG NFR BLD AUTO: 42 % (ref 43–75)
NONHDLC SERPL-MCNC: 161 MG/DL
NRBC BLD AUTO-RTO: 0 /100 WBCS
PLATELET # BLD AUTO: 173 THOUSANDS/UL (ref 149–390)
PMV BLD AUTO: 11.2 FL (ref 8.9–12.7)
POTASSIUM SERPL-SCNC: 3.8 MMOL/L (ref 3.5–5.3)
PROT SERPL-MCNC: 7.4 G/DL (ref 6.4–8.2)
RBC # BLD AUTO: 4.64 MILLION/UL (ref 3.81–5.12)
SODIUM SERPL-SCNC: 137 MMOL/L (ref 136–145)
TRIGL SERPL-MCNC: 72 MG/DL
WBC # BLD AUTO: 4.08 THOUSAND/UL (ref 4.31–10.16)

## 2019-02-14 PROCEDURE — 36415 COLL VENOUS BLD VENIPUNCTURE: CPT

## 2019-02-14 PROCEDURE — 80053 COMPREHEN METABOLIC PANEL: CPT

## 2019-02-14 PROCEDURE — 80061 LIPID PANEL: CPT

## 2019-02-14 PROCEDURE — 85025 COMPLETE CBC W/AUTO DIFF WBC: CPT

## 2019-02-25 ENCOUNTER — OFFICE VISIT (OUTPATIENT)
Dept: INTERNAL MEDICINE CLINIC | Facility: CLINIC | Age: 69
End: 2019-02-25
Payer: COMMERCIAL

## 2019-02-25 VITALS
HEART RATE: 64 BPM | WEIGHT: 115.6 LBS | RESPIRATION RATE: 12 BRPM | BODY MASS INDEX: 20.48 KG/M2 | HEIGHT: 63 IN | SYSTOLIC BLOOD PRESSURE: 118 MMHG | DIASTOLIC BLOOD PRESSURE: 82 MMHG

## 2019-02-25 DIAGNOSIS — R31.29 MICROHEMATURIA: ICD-10-CM

## 2019-02-25 DIAGNOSIS — M85.80 OSTEOPENIA, UNSPECIFIED LOCATION: Primary | ICD-10-CM

## 2019-02-25 DIAGNOSIS — N28.1 RENAL CYST: ICD-10-CM

## 2019-02-25 DIAGNOSIS — Z78.0 MENOPAUSE: ICD-10-CM

## 2019-02-25 DIAGNOSIS — E78.2 MIXED HYPERLIPIDEMIA: ICD-10-CM

## 2019-02-25 PROCEDURE — 99214 OFFICE O/P EST MOD 30 MIN: CPT | Performed by: INTERNAL MEDICINE

## 2019-02-25 PROCEDURE — 1036F TOBACCO NON-USER: CPT | Performed by: INTERNAL MEDICINE

## 2019-02-25 PROCEDURE — 1160F RVW MEDS BY RX/DR IN RCRD: CPT | Performed by: INTERNAL MEDICINE

## 2019-02-25 PROCEDURE — 3008F BODY MASS INDEX DOCD: CPT | Performed by: INTERNAL MEDICINE

## 2019-02-25 NOTE — PROGRESS NOTES
Assessment/Plan:    Patient Instructions   Lab data reviewed in detail and compared prior    Hyperlipidemia -10 year atherosclerotic risk calculated 7 3%, no indication for statin treatment, continue with excellent exercise regimen and low saturated fat diet    Blood pressure remained stable without medication, periodically check home blood pressures  History of microscopic hematuria with renal cyst following yearly with Urology, prior workup negative  History of breast cancer stable    Osteopenia-due for follow-up bone density, will discuss results by phone  Routine follow-up after labs in 6 months, sooner as needed  Diagnoses and all orders for this visit:    Osteopenia, unspecified location  -     DXA bone density spine hip and pelvis; Future    Mixed hyperlipidemia  -     CBC and differential; Future  -     Comprehensive metabolic panel; Future  -     Lipid panel; Future  -     TSH, 3rd generation with Free T4 reflex; Future    Menopause  -     DXA bone density spine hip and pelvis; Future    Microhematuria    Renal cyst         Subjective:      Patient ID: Vishnu Mason is a 70 y o  female    Feeling well, no complaints  Walking or stat bike most days, less walking over winter  HTN-no home bp's, it had been stable  HPL-exercising, but no real dietary change  Red meat 2-3 x per week, usually venison           Current Outpatient Medications:     Calcium 500 MG tablet, Take by mouth, Disp: , Rfl:     Multiple Vitamin (MULTI-VITAMIN DAILY) TABS, Take by mouth, Disp: , Rfl:     Progesterone Micronized 10 % CREA, Place on the skin, Disp: , Rfl:     Recent Results (from the past 1008 hour(s))   CBC and differential    Collection Time: 02/14/19  7:57 AM   Result Value Ref Range    WBC 4 08 (L) 4 31 - 10 16 Thousand/uL    RBC 4 64 3 81 - 5 12 Million/uL    Hemoglobin 12 9 11 5 - 15 4 g/dL    Hematocrit 41 6 34 8 - 46 1 %    MCV 90 82 - 98 fL    MCH 27 8 26 8 - 34 3 pg    MCHC 31 0 (L) 31 4 - 37 4 g/dL    RDW 13 9 11 6 - 15 1 %    MPV 11 2 8 9 - 12 7 fL    Platelets 954 563 - 604 Thousands/uL    nRBC 0 /100 WBCs    Neutrophils Relative 42 (L) 43 - 75 %    Immat GRANS % 0 0 - 2 %    Lymphocytes Relative 42 14 - 44 %    Monocytes Relative 11 4 - 12 %    Eosinophils Relative 4 0 - 6 %    Basophils Relative 1 0 - 1 %    Neutrophils Absolute 1 71 (L) 1 85 - 7 62 Thousands/µL    Immature Grans Absolute 0 00 0 00 - 0 20 Thousand/uL    Lymphocytes Absolute 1 74 0 60 - 4 47 Thousands/µL    Monocytes Absolute 0 43 0 17 - 1 22 Thousand/µL    Eosinophils Absolute 0 17 0 00 - 0 61 Thousand/µL    Basophils Absolute 0 03 0 00 - 0 10 Thousands/µL   Comprehensive metabolic panel    Collection Time: 02/14/19  7:57 AM   Result Value Ref Range    Sodium 137 136 - 145 mmol/L    Potassium 3 8 3 5 - 5 3 mmol/L    Chloride 102 100 - 108 mmol/L    CO2 30 21 - 32 mmol/L    ANION GAP 5 4 - 13 mmol/L    BUN 8 5 - 25 mg/dL    Creatinine 0 70 0 60 - 1 30 mg/dL    Glucose, Fasting 86 65 - 99 mg/dL    Calcium 8 8 8 3 - 10 1 mg/dL    AST 24 5 - 45 U/L    ALT 18 12 - 78 U/L    Alkaline Phosphatase 63 46 - 116 U/L    Total Protein 7 4 6 4 - 8 2 g/dL    Albumin 4 0 3 5 - 5 0 g/dL    Total Bilirubin 0 39 0 20 - 1 00 mg/dL    eGFR 89 ml/min/1 73sq m   Lipid panel    Collection Time: 02/14/19  7:57 AM   Result Value Ref Range    Cholesterol 231 (H) 50 - 200 mg/dL    Triglycerides 72 <=150 mg/dL    HDL, Direct 70 (H) 40 - 60 mg/dL    LDL Calculated 147 (H) 0 - 100 mg/dL    Non-HDL-Chol (CHOL-HDL) 161 mg/dl       The following portions of the patient's history were reviewed and updated as appropriate: allergies, current medications, past family history, past medical history, past social history, past surgical history and problem list      Review of Systems   Constitutional: Negative for appetite change, chills, diaphoresis, fatigue, fever and unexpected weight change  HENT: Negative for congestion, hearing loss and rhinorrhea      Eyes: Negative for visual disturbance  Respiratory: Negative for cough, chest tightness, shortness of breath and wheezing  Cardiovascular: Negative for chest pain, palpitations and leg swelling  Gastrointestinal: Negative for abdominal pain and blood in stool  Endocrine: Negative for cold intolerance, heat intolerance, polydipsia and polyuria  Genitourinary: Negative for difficulty urinating, dysuria, frequency and urgency  Musculoskeletal: Positive for arthralgias  Negative for myalgias  Skin: Negative for rash  Neurological: Negative for dizziness, weakness, light-headedness and headaches  Hematological: Does not bruise/bleed easily  Psychiatric/Behavioral: Negative for dysphoric mood and sleep disturbance  Objective:      Vitals:    02/25/19 1250   BP: 118/82   Pulse: 64   Resp: 12          Physical Exam   Constitutional: She is oriented to person, place, and time  She appears well-developed and well-nourished  HENT:   Head: Normocephalic and atraumatic  Nose: Nose normal    Mouth/Throat: Oropharynx is clear and moist    Eyes: Pupils are equal, round, and reactive to light  Conjunctivae and EOM are normal  No scleral icterus  Neck: Normal range of motion  Neck supple  No JVD present  No tracheal deviation present  No thyromegaly present  Cardiovascular: Normal rate, regular rhythm and intact distal pulses  Exam reveals no gallop and no friction rub  No murmur heard  Pulmonary/Chest: Effort normal and breath sounds normal  No respiratory distress  She has no wheezes  She has no rales  Musculoskeletal: She exhibits no edema or deformity  Lymphadenopathy:     She has no cervical adenopathy  Neurological: She is alert and oriented to person, place, and time  No cranial nerve deficit  Skin: Skin is warm and dry  No rash noted  No erythema  No pallor  Psychiatric: She has a normal mood and affect   Her behavior is normal  Judgment and thought content normal

## 2019-02-25 NOTE — PATIENT INSTRUCTIONS
Lab data reviewed in detail and compared prior    Hyperlipidemia -10 year atherosclerotic risk calculated 7 3%, no indication for statin treatment, continue with excellent exercise regimen and low saturated fat diet    Blood pressure remained stable without medication, periodically check home blood pressures  History of microscopic hematuria with renal cyst following yearly with Urology, prior workup negative  History of breast cancer stable    Osteopenia-due for follow-up bone density, will discuss results by phone  Routine follow-up after labs in 6 months, sooner as needed

## 2019-07-08 ENCOUNTER — HOSPITAL ENCOUNTER (OUTPATIENT)
Dept: ULTRASOUND IMAGING | Facility: CLINIC | Age: 69
Discharge: HOME | End: 2019-07-08
Payer: COMMERCIAL

## 2019-07-08 DIAGNOSIS — R31.29 MICROHEMATURIA: ICD-10-CM

## 2019-07-08 DIAGNOSIS — N28.1 RENAL CYST: ICD-10-CM

## 2019-07-08 PROCEDURE — 76770 US EXAM ABDO BACK WALL COMP: CPT

## 2019-07-16 DIAGNOSIS — R31.29 MICROHEMATURIA: Primary | ICD-10-CM

## 2019-07-19 ENCOUNTER — APPOINTMENT (OUTPATIENT)
Dept: LAB | Facility: CLINIC | Age: 69
End: 2019-07-19
Payer: COMMERCIAL

## 2019-07-19 LAB
BACTERIA UR QL AUTO: ABNORMAL /HPF
BILIRUB UR QL STRIP: NEGATIVE
CLARITY UR: CLEAR
COLOR UR: YELLOW
GLUCOSE UR STRIP-MCNC: NEGATIVE MG/DL
HGB UR QL STRIP.AUTO: NEGATIVE
HYALINE CASTS #/AREA URNS LPF: ABNORMAL /LPF
KETONES UR STRIP-MCNC: NEGATIVE MG/DL
LEUKOCYTE ESTERASE UR QL STRIP: NEGATIVE
NITRITE UR QL STRIP: NEGATIVE
NON-SQ EPI CELLS URNS QL MICRO: ABNORMAL /HPF
PH UR STRIP.AUTO: 7 [PH]
PROT UR STRIP-MCNC: NEGATIVE MG/DL
RBC #/AREA URNS AUTO: ABNORMAL /HPF
SP GR UR STRIP.AUTO: 1.01 (ref 1–1.03)
UROBILINOGEN UR QL STRIP.AUTO: 0.2 E.U./DL
WBC #/AREA URNS AUTO: ABNORMAL /HPF

## 2019-07-19 PROCEDURE — 81001 URINALYSIS AUTO W/SCOPE: CPT

## 2019-07-22 NOTE — PROGRESS NOTES
1  Renal cyst         Assessment and plan:       1  Microhematuria    - s/p negative workup (12/2016)  - persistent     2  Bosniak 2F renal cyst   - stable on surveillance x3 years  - no further radiographic follow-up is necessary    I was happy to review the patient's ultrasound  Her renal cysts are stable in size and morphology from the time of original diagnosis in 2016 at the time for microscopic hematuria evaluation  Based on these findings these lesions are certainly benign, and no further radiographic surveillance is required  He does have persistent asymptomatic microscopic hematuria  She has a low risk patient  We discussed that if this finding were to persist for 5 years, I would like to repeat cystoscopy at that point (2021)    He will follow up for an office visit in 2 years time with a urinalysis with microscopy  If any persistent microscopic hematuria is noted at that time we will plan for repeat diagnostic cystoscopy       Sydnee Hill MD      Chief Complaint      Follow-up renal cyst, hematuria    History of Present Illness     Ambrosio Estrella is a 70 y o  female returns in follow-up for hematuria as well as renal cyst        In brief, Patient had been found to have microscopic hematuria on urinalysis  Renal ultrasound was obtained which revealed a 1 3 cm simple appearing renal cysts  Patient had a CT scan which showed 2 small renal cysts, one simple the other has a thin septations classifying it as a Bosniak 2F  The cystoscopy was performed 12/22/16 which was negative for any abnormalities  He has been followed with annual imaging  Her cyst is been stable in size  She presents with an interval ultrasound which again demonstrates complete stability both in size and morphology of the cystic lesions  Recent urinalysis does reveal for 10 red blood cells per high-powered field    She remains asymptomatic, no gross hematuria or new urinary symptoms    Laboratory     Lab Results Component Value Date    CREATININE 0 70 02/14/2019       Review of Systems     Review of Systems   Constitutional: Negative for activity change, appetite change, chills, diaphoresis, fatigue, fever and unexpected weight change  Respiratory: Negative for chest tightness and shortness of breath  Cardiovascular: Negative for chest pain, palpitations and leg swelling  Gastrointestinal: Negative for abdominal distention, abdominal pain, constipation, diarrhea, nausea and vomiting  Genitourinary: Negative for decreased urine volume, difficulty urinating, dysuria, enuresis, flank pain, frequency, genital sores, hematuria and urgency  Musculoskeletal: Negative for back pain, gait problem and myalgias  Skin: Negative for color change, pallor, rash and wound  Psychiatric/Behavioral: Negative for behavioral problems  The patient is not nervous/anxious  Allergies     No Known Allergies    Physical Exam     Physical Exam   Constitutional: She is oriented to person, place, and time  She appears well-developed and well-nourished  No distress  HENT:   Head: Normocephalic and atraumatic  Eyes: Conjunctivae are normal    Neck: Normal range of motion  No tracheal deviation present  Pulmonary/Chest: Effort normal    Abdominal: Soft  She exhibits no distension and no mass  There is no tenderness  There is no rebound and no guarding  No CVA tenderness   Musculoskeletal: Normal range of motion  She exhibits no edema or deformity  Neurological: She is alert and oriented to person, place, and time  Skin: Skin is warm and dry  She is not diaphoretic  No erythema  No pallor  Psychiatric: She has a normal mood and affect  Her behavior is normal          Vital Signs     There were no vitals filed for this visit        Current Medications       Current Outpatient Medications:     Calcium 500 MG tablet, Take by mouth, Disp: , Rfl:     Multiple Vitamin (MULTI-VITAMIN DAILY) TABS, Take by mouth, Disp: , Rfl:     Progesterone Micronized 10 % CREA, Place on the skin, Disp: , Rfl:       Active Problems     Patient Active Problem List   Diagnosis    Hyperlipidemia    Hypertension    Microhematuria    Osteopenia    Personal history of breast cancer    Renal cyst         Past Medical History     Past Medical History:   Diagnosis Date    [facility] cancer ([facility] [address]     Hematuria     LAST ASSESSED [id number]         Surgical History     Past Surgical History:   Procedure Laterality Date    BREAST LUMPECTOMY      LYMPH NODE BIOPSY      MOHS SURGERY           Family History     No family history on file  Social History     Social History       Radiology     Contains abnormal data Urinalysis with microscopic   Order: [id number]   Status:  Final result   Visible to patient:  Yes (MyChart)   Next appt:  08/27/2019 at 01:30 PM in Internal Medicine (Vaishali Hong MD)   Dx:  Microhematuria    Ref Range & Units 7/19/19  9:28 AM   Clarity, UA  Clear    Color, UA  Yellow    Specific [place], UA 1 003 - 1 030 1 014    pH, UA 4 5, 5 0, 5 5, 6 0, 6 5, 7 0, 7 5, 8 0 7 0    Glucose, UA Negative mg/dl Negative    Ketones, UA Negative mg/dl Negative    Blood, UA Negative Negative    Protein, UA Negative mg/dl Negative    Nitrite, UA Negative Negative    Bilirubin, UA Negative Negative    Urobilinogen, UA 0 2, 1 0 E U /dl E U /dl 0 2    Leukocytes, UA Negative Negative    WBC, UA None Seen, 0-5, 5-55, 5-65 /hpf None Seen    RBC, UA None Seen, 0-5 /hpf 4-10Abnormal     Hyaline Casts, UA None Seen /lpf None Seen    Bacteria, UA None Seen, Occasional /hpf None Seen    Epithelial Cells None Seen, Occasional /hpf None Seen                FINDINGS:     KIDNEYS:  Symmetric and normal size  Right kidney:  10 1 x 3 4 cm  Left kidney:  10 8 x 4 7 cm      Right kidney  Normal echogenicity and contour  No suspicious masses detected  No hydronephrosis  No shadowing calculi    No perinephric fluid collections      Left kidney  Normal echogenicity and contour  No suspicious masses detected  There is a simple appearing cyst measuring 1 7 x 1 6 x 1 7 cm  This previously measured 1 5 x 1 4 x 1 5 cm  No hydronephrosis  No shadowing calculi  No perinephric fluid collections      URETERS:  Nonvisualized      BLADDER:   Normally distended    No focal thickening or mass lesions      IMPRESSION:     Minimally increased simple left renal cyst

## 2019-07-23 ENCOUNTER — OFFICE VISIT (OUTPATIENT)
Dept: UROLOGY | Facility: CLINIC | Age: 69
End: 2019-07-23
Payer: COMMERCIAL

## 2019-07-23 VITALS
BODY MASS INDEX: 20.55 KG/M2 | HEART RATE: 78 BPM | SYSTOLIC BLOOD PRESSURE: 116 MMHG | WEIGHT: 116 LBS | DIASTOLIC BLOOD PRESSURE: 80 MMHG | HEIGHT: 63 IN

## 2019-07-23 DIAGNOSIS — R31.29 MICROHEMATURIA: ICD-10-CM

## 2019-07-23 DIAGNOSIS — N28.1 RENAL CYST: Primary | ICD-10-CM

## 2019-07-23 PROCEDURE — 99214 OFFICE O/P EST MOD 30 MIN: CPT | Performed by: UROLOGY

## 2019-08-20 ENCOUNTER — APPOINTMENT (OUTPATIENT)
Dept: LAB | Facility: CLINIC | Age: 69
End: 2019-08-20
Payer: COMMERCIAL

## 2019-08-20 DIAGNOSIS — E78.2 MIXED HYPERLIPIDEMIA: ICD-10-CM

## 2019-08-20 LAB
ALBUMIN SERPL BCP-MCNC: 3.9 G/DL (ref 3.5–5)
ALP SERPL-CCNC: 57 U/L (ref 46–116)
ALT SERPL W P-5'-P-CCNC: 22 U/L (ref 12–78)
ANION GAP SERPL CALCULATED.3IONS-SCNC: 4 MMOL/L (ref 4–13)
AST SERPL W P-5'-P-CCNC: 27 U/L (ref 5–45)
BASOPHILS # BLD AUTO: 0.04 THOUSANDS/ΜL (ref 0–0.1)
BASOPHILS NFR BLD AUTO: 1 % (ref 0–1)
BILIRUB SERPL-MCNC: 0.34 MG/DL (ref 0.2–1)
BUN SERPL-MCNC: 13 MG/DL (ref 5–25)
CALCIUM SERPL-MCNC: 8.8 MG/DL (ref 8.3–10.1)
CHLORIDE SERPL-SCNC: 103 MMOL/L (ref 100–108)
CHOLEST SERPL-MCNC: 203 MG/DL (ref 50–200)
CO2 SERPL-SCNC: 32 MMOL/L (ref 21–32)
CREAT SERPL-MCNC: 0.7 MG/DL (ref 0.6–1.3)
EOSINOPHIL # BLD AUTO: 0.18 THOUSAND/ΜL (ref 0–0.61)
EOSINOPHIL NFR BLD AUTO: 4 % (ref 0–6)
ERYTHROCYTE [DISTWIDTH] IN BLOOD BY AUTOMATED COUNT: 13.2 % (ref 11.6–15.1)
GFR SERPL CREATININE-BSD FRML MDRD: 88 ML/MIN/1.73SQ M
GLUCOSE P FAST SERPL-MCNC: 88 MG/DL (ref 65–99)
HCT VFR BLD AUTO: 41.8 % (ref 34.8–46.1)
HDLC SERPL-MCNC: 53 MG/DL (ref 40–60)
HGB BLD-MCNC: 13.5 G/DL (ref 11.5–15.4)
IMM GRANULOCYTES # BLD AUTO: 0.01 THOUSAND/UL (ref 0–0.2)
IMM GRANULOCYTES NFR BLD AUTO: 0 % (ref 0–2)
LDLC SERPL CALC-MCNC: 138 MG/DL (ref 0–100)
LYMPHOCYTES # BLD AUTO: 2.23 THOUSANDS/ΜL (ref 0.6–4.47)
LYMPHOCYTES NFR BLD AUTO: 47 % (ref 14–44)
MCH RBC QN AUTO: 29.5 PG (ref 26.8–34.3)
MCHC RBC AUTO-ENTMCNC: 32.3 G/DL (ref 31.4–37.4)
MCV RBC AUTO: 91 FL (ref 82–98)
MONOCYTES # BLD AUTO: 0.45 THOUSAND/ΜL (ref 0.17–1.22)
MONOCYTES NFR BLD AUTO: 9 % (ref 4–12)
NEUTROPHILS # BLD AUTO: 1.87 THOUSANDS/ΜL (ref 1.85–7.62)
NEUTS SEG NFR BLD AUTO: 39 % (ref 43–75)
NONHDLC SERPL-MCNC: 150 MG/DL
NRBC BLD AUTO-RTO: 0 /100 WBCS
PLATELET # BLD AUTO: 222 THOUSANDS/UL (ref 149–390)
PMV BLD AUTO: 10.4 FL (ref 8.9–12.7)
POTASSIUM SERPL-SCNC: 3.9 MMOL/L (ref 3.5–5.3)
PROT SERPL-MCNC: 7.3 G/DL (ref 6.4–8.2)
RBC # BLD AUTO: 4.58 MILLION/UL (ref 3.81–5.12)
SODIUM SERPL-SCNC: 139 MMOL/L (ref 136–145)
TRIGL SERPL-MCNC: 58 MG/DL
TSH SERPL DL<=0.05 MIU/L-ACNC: 2.01 UIU/ML (ref 0.36–3.74)
WBC # BLD AUTO: 4.78 THOUSAND/UL (ref 4.31–10.16)

## 2019-08-20 PROCEDURE — 80053 COMPREHEN METABOLIC PANEL: CPT

## 2019-08-20 PROCEDURE — 84443 ASSAY THYROID STIM HORMONE: CPT

## 2019-08-20 PROCEDURE — 85025 COMPLETE CBC W/AUTO DIFF WBC: CPT

## 2019-08-20 PROCEDURE — 80061 LIPID PANEL: CPT

## 2019-08-20 PROCEDURE — 36415 COLL VENOUS BLD VENIPUNCTURE: CPT

## 2019-08-27 ENCOUNTER — OFFICE VISIT (OUTPATIENT)
Dept: INTERNAL MEDICINE CLINIC | Facility: CLINIC | Age: 69
End: 2019-08-27
Payer: COMMERCIAL

## 2019-08-27 VITALS
RESPIRATION RATE: 12 BRPM | BODY MASS INDEX: 21.27 KG/M2 | HEART RATE: 80 BPM | HEIGHT: 62 IN | DIASTOLIC BLOOD PRESSURE: 84 MMHG | SYSTOLIC BLOOD PRESSURE: 130 MMHG | WEIGHT: 115.6 LBS

## 2019-08-27 DIAGNOSIS — R31.29 MICROHEMATURIA: ICD-10-CM

## 2019-08-27 DIAGNOSIS — Z85.3 PERSONAL HISTORY OF BREAST CANCER: ICD-10-CM

## 2019-08-27 DIAGNOSIS — Z11.59 NEED FOR HEPATITIS C SCREENING TEST: ICD-10-CM

## 2019-08-27 DIAGNOSIS — E78.2 MIXED HYPERLIPIDEMIA: Primary | ICD-10-CM

## 2019-08-27 DIAGNOSIS — N28.1 RENAL CYST: ICD-10-CM

## 2019-08-27 DIAGNOSIS — M85.80 OSTEOPENIA, UNSPECIFIED LOCATION: ICD-10-CM

## 2019-08-27 PROCEDURE — G0439 PPPS, SUBSEQ VISIT: HCPCS | Performed by: INTERNAL MEDICINE

## 2019-08-27 PROCEDURE — 99214 OFFICE O/P EST MOD 30 MIN: CPT | Performed by: INTERNAL MEDICINE

## 2019-08-27 PROCEDURE — 1101F PT FALLS ASSESS-DOCD LE1/YR: CPT | Performed by: INTERNAL MEDICINE

## 2019-08-27 NOTE — PROGRESS NOTES
Assessment and Plan:     Problem List Items Addressed This Visit     None         History of Present Illness:     Patient presents for Medicare Annual Wellness visit    Patient Care Team:  Evans Thomas MD as PCP - Gilford Noble, MD Markus Handing, MD     Problem List:     Patient Active Problem List   Diagnosis    Hyperlipidemia    Hypertension    Microhematuria    Osteopenia    Personal history of breast cancer    Renal cyst      Past Medical and Surgical History:     Past Medical History:   Diagnosis Date    Breast cancer ([address] )     Hematuria     LAST ASSESSED [id number]     Past Surgical History:   Procedure Laterality Date    BREAST LUMPECTOMY      LYMPH NODE BIOPSY      MOHS SURGERY        Family History:     No family history on file  Social History:     Social History     Tobacco Use   Smoking Status Never Smoker   Smokeless Tobacco Never Used     Social History     Substance and Sexual Activity   Alcohol Use Yes     Social History     Substance and Sexual Activity   Drug Use No      Medications and Allergies:     Current Outpatient Medications   Medication Sig Dispense Refill    Calcium 500 MG tablet Take by mouth      Multiple Vitamin (MULTI-VITAMIN DAILY) TABS Take by mouth      Progesterone Micronized 10 % CREA Place on the skin       No current facility-administered medications for this visit  No Known Allergies   Immunizations:     Immunization History   Administered Date(s) Administered    INFLUENZA 12/17/2018    Influenza Split High Dose Preservative Free IM 12/17/2018    Influenza TIV (IM) 1950    Pneumococcal Conjugate 13-Valent 12/09/2015    Pneumococcal Polysaccharide PPV23 07/20/2017    Tdap 1950    Zoster 01/01/2012      Medicare Screening Tests and Risk Assessments:         Health Risk Assessment:  Patient rates overall health as excellent  Patient feels that their physical health rating is Same  Eyesight was rated as Same   Hearing was rated as Same  Patient feels that their emotional and mental health rating is Same  Pain experienced by patient in the last 7 days has been None  Emotional/Mental Health:  Patient has not been feeling nervous/anxious  PHQ-9 Depression Screening:    Frequency of the following problems over the past two weeks:      1  Little interest or pleasure in doing things: 0 - not at all      2  Feeling down, depressed, or hopeless: 0 - not at all  PHQ-2 Score: 0          Broken Bones/Falls: Fall Risk Assessment:    In the past year, patient has experienced: No history of falling in past year          Bladder/Bowel:  Patient has leaked urine accidently in the last six months  Patient reports no loss of bowel control  Immunizations:  Patient has had a flu vaccination within the last year  Patient has received a pneumonia shot  Patient has received a shingles shot  Home Safety:  Patient does not have trouble with stairs inside or outside of their home  Patient currently reports that there are safety hazards present in home , working smoke alarms, no working carbon monoxide detectors  Preventative Screenings:   Breast cancer screening performed, 9/5/2018  colon cancer screen completed, 6/18/2018  cholesterol screen completed, 8/20/2019  glaucoma eye exam completed,     Nutrition:  Current diet: Regular and Limited junk food with servings of the following:    Medications:  Patient is currently taking over-the-counter supplements  Patient is able to manage medications  Lifestyle Choices:  Patient reports no tobacco use  Patient has not smoked or used tobacco in the past   Patient reports alcohol use  Alcohol use per week: 7  Patient drives a vehicle  Patient wears seat belt  Current level of exercise of physical activity described by patient as: 30 min daily exercise           Activities of Daily Living:  Can get out of bed by his or her self, able to dress self, able to make own meals, able to do own shopping, able to bathe self, can do own laundry/housekeeping, can manage own money, pay bills and track expenses    Previous Hospitalizations:  No hospitalization or ED visit in past 12 months        Advanced Directives:  Patient has decided on a power of   Patient has spoken to designated power of   Patient has not completed advanced directive  Preventative Screening/Counseling:      Cardiovascular:      General: Screening Current          Diabetes:      General: Screening Current          Colorectal Cancer:      General: Screening Current          Breast Cancer:      General: Screening Current          Cervical Cancer:      General: Screening Current          Osteoporosis:      General: Screening Current          AAA:      General: Screening Not Indicated          Glaucoma:      General: Risks and Benefits Discussed          HIV:      General: Screening Not Indicated          Hepatitis C:      General: Risks and Benefits Discussed        Advanced Directives:   Patient has no living will for healthcare, has durable POA for healthcare, patient does not have an advanced directive     Additional Comments: Lizbet Page or lindsey Harrison

## 2019-08-27 NOTE — PATIENT INSTRUCTIONS
Lab data reviewed in detail and compared to prior    Hyperlipidemia-LDL improved however HDL dropped as well, continue to follow, no indication for medication    Blood pressure remained stable without medication    Osteopenia-due for bone density next month, continue with weight-bearing exercise and calcium and vitamin-D supplementation    History of breast cancer stable without recurrence getting mammograms through gynecology every other year    Health maintenance is up-to-date, we discussed Shingrix available at your local pharmacy    Routine follow-up after labs in 6 months, sooner as needed

## 2019-08-27 NOTE — PROGRESS NOTES
Assessment/Plan:    Diagnoses and all orders for this visit:    Mixed hyperlipidemia  -     CBC and differential; Future  -     Comprehensive metabolic panel; Future  -     Lipid panel; Future    Renal cyst    Microhematuria    Osteopenia, unspecified location    Personal history of breast cancer    Need for hepatitis C screening test  -     Hepatitis C antibody; Future         Patient Instructions   Lab data reviewed in detail and compared to prior    Hyperlipidemia-LDL improved however HDL dropped as well, continue to follow, no indication for medication    Blood pressure remained stable without medication    Osteopenia-due for bone density next month, continue with weight-bearing exercise and calcium and vitamin-D supplementation    History of breast cancer stable without recurrence getting mammograms through gynecology every other year    Health maintenance is up-to-date, we discussed Shingrix available at your local pharmacy    Routine follow-up after labs in 6 months, sooner as needed  Subjective:      Patient ID: Char Freed is a 78 y o  female    Feeling well, no complaints  Walking or stat bike most days, wts, stretch, calesthetics at least 30 min 7 d per week  No cp/sob  HTN-no home bp's  HPL-exercising regularly and watching fats in diet  Red meat is mostly venison           Current Outpatient Medications:     Calcium 500 MG tablet, Take by mouth, Disp: , Rfl:     Multiple Vitamin (MULTI-VITAMIN DAILY) TABS, Take by mouth, Disp: , Rfl:     Progesterone Micronized 10 % CREA, Place on the skin, Disp: , Rfl:     Recent Results (from the past 1008 hour(s))   Urinalysis with microscopic    Collection Time: 07/19/19  9:28 AM   Result Value Ref Range    Clarity, UA Clear     Color, UA Yellow     Specific [place], UA 1 014 1 003 - 1 030    pH, UA 7 0 4 5, 5 0, 5 5, 6 0, 6 5, 7 0, 7 5, 8 0    Glucose, UA Negative Negative mg/dl    Ketones, UA Negative Negative mg/dl    Blood, UA Negative Negative Protein, UA Negative Negative mg/dl    Nitrite, UA Negative Negative    Bilirubin, UA Negative Negative    Urobilinogen, UA 0 2 0 2, 1 0 E U /dl E U /dl    Leukocytes, UA Negative Negative    WBC, UA None Seen None Seen, 0-5, 5-55, 5-65 /hpf    RBC, UA 4-10 (A) None Seen, 0-5 /hpf    Hyaline Casts, UA None Seen None Seen /lpf    Bacteria, UA None Seen None Seen, Occasional /hpf    Epithelial Cells None Seen None Seen, Occasional /hpf   CBC and differential    Collection Time: 08/20/19  7:48 AM   Result Value Ref Range    WBC 4 78 4 31 - 10 16 Thousand/uL    RBC 4 58 3 81 - 5 12 Million/uL    Hemoglobin 13 5 11 5 - 15 4 g/dL    Hematocrit 41 8 34 8 - 46 1 %    MCV 91 82 - 98 fL    MCH 29 5 26 8 - 34 3 pg    MCHC 32 3 31 4 - 37 4 g/dL    RDW 13 2 11 6 - 15 1 %    MPV 10 4 8 9 - 12 7 fL    Platelets 510 337 - 184 Thousands/uL    nRBC 0 /100 WBCs    Neutrophils Relative 39 (L) 43 - 75 %    Immat GRANS % 0 0 - 2 %    Lymphocytes Relative 47 (H) 14 - 44 %    Monocytes Relative 9 4 - 12 %    Eosinophils Relative 4 0 - 6 %    Basophils Relative 1 0 - 1 %    Neutrophils Absolute 1 87 1 85 - 7 62 Thousands/µL    Immature Grans Absolute 0 01 0 00 - 0 20 Thousand/uL    Lymphocytes Absolute 2 23 0 60 - 4 47 Thousands/µL    Monocytes Absolute 0 45 0 17 - 1 22 Thousand/µL    Eosinophils Absolute 0 18 0 00 - 0 61 Thousand/µL    Basophils Absolute 0 04 0 00 - 0 10 Thousands/µL   Comprehensive metabolic panel    Collection Time: 08/20/19  7:48 AM   Result Value Ref Range    Sodium 139 136 - 145 mmol/L    Potassium 3 9 3 5 - 5 3 mmol/L    Chloride 103 100 - 108 mmol/L    CO2 32 21 - 32 mmol/L    ANION GAP 4 4 - 13 mmol/L    BUN 13 5 - 25 mg/dL    Creatinine 0 70 0 60 - 1 30 mg/dL    Glucose, Fasting 88 65 - 99 mg/dL    Calcium 8 8 8 3 - 10 1 mg/dL    AST 27 5 - 45 U/L    ALT 22 12 - 78 U/L    Alkaline Phosphatase 57 46 - 116 U/L    Total Protein 7 3 6 4 - 8 2 g/dL    Albumin 3 9 3 5 - 5 0 g/dL    Total Bilirubin 0 34 0 20 - 1 00 mg/dL eGFR 88 ml/min/1 73sq m   Lipid panel    Collection Time: 08/20/19  7:48 AM   Result Value Ref Range    Cholesterol 203 (H) 50 - 200 mg/dL    Triglycerides 58 <=150 mg/dL    HDL, Direct 53 40 - 60 mg/dL    LDL Calculated 138 (H) 0 - 100 mg/dL    Non-HDL-Chol (CHOL-HDL) 150 mg/dl   TSH, 3rd generation with Free T4 reflex    Collection Time: 08/20/19  7:48 AM   Result Value Ref Range    TSH 3RD GENERATON 2 010 0 358 - 3 740 uIU/mL       The following portions of the patient's history were reviewed and updated as appropriate: allergies, current medications, past family history, past medical history, past social history, past surgical history and problem list      Review of Systems   Constitutional: Negative for appetite change, chills, diaphoresis, fatigue, fever and unexpected weight change  HENT: Negative for congestion, hearing loss and rhinorrhea  Eyes: Negative for visual disturbance  Respiratory: Negative for cough, chest tightness, shortness of breath and wheezing  Cardiovascular: Negative for chest pain, palpitations and leg swelling  Gastrointestinal: Negative for abdominal pain and blood in stool  Endocrine: Negative for cold intolerance, heat intolerance, polydipsia and polyuria  Genitourinary: Negative for difficulty urinating, dysuria, frequency and urgency  Musculoskeletal: Negative for arthralgias and myalgias  Skin: Negative for rash  Neurological: Negative for dizziness, weakness, light-headedness and headaches  Hematological: Does not bruise/bleed easily  Psychiatric/Behavioral: Negative for dysphoric mood and sleep disturbance  Objective:      Vitals:    08/27/19 1326   BP: 130/84   Pulse: 80   Resp: 12          Physical Exam   Constitutional: She is oriented to person, place, and time  She appears well-developed and well-nourished  HENT:   Head: Normocephalic and atraumatic     Nose: Nose normal    Mouth/Throat: Oropharynx is clear and moist    Eyes: Pupils are equal, round, and reactive to light  Conjunctivae and EOM are normal  No scleral icterus  Neck: Normal range of motion  Neck supple  No JVD present  No tracheal deviation present  No thyromegaly present  Cardiovascular: Normal rate, regular rhythm and intact distal pulses  Exam reveals no gallop and no friction rub  No murmur heard  Pulmonary/Chest: Effort normal and breath sounds normal  No respiratory distress  She has no wheezes  She has no rales  Musculoskeletal: She exhibits no edema or deformity  Lymphadenopathy:     She has no cervical adenopathy  Neurological: She is alert and oriented to person, place, and time  No cranial nerve deficit  Skin: Skin is warm and dry  No rash noted  No erythema  No pallor  Psychiatric: She has a normal mood and affect   Her behavior is normal  Judgment and thought content normal

## 2019-09-17 ENCOUNTER — TELEPHONE (OUTPATIENT)
Dept: INTERNAL MEDICINE CLINIC | Facility: CLINIC | Age: 69
End: 2019-09-17

## 2019-09-17 ENCOUNTER — HOSPITAL ENCOUNTER (OUTPATIENT)
Dept: MAMMOGRAPHY | Facility: CLINIC | Age: 69
Discharge: HOME | End: 2019-09-17
Payer: COMMERCIAL

## 2019-09-17 DIAGNOSIS — M85.80 OSTEOPENIA, UNSPECIFIED LOCATION: ICD-10-CM

## 2019-09-17 DIAGNOSIS — Z78.0 MENOPAUSE: ICD-10-CM

## 2019-09-17 PROCEDURE — 77080 DXA BONE DENSITY AXIAL: CPT

## 2019-09-17 NOTE — TELEPHONE ENCOUNTER
----- Message from Kari Kwong MD sent at 9/17/2019  5:39 PM EDT -----  Notify -bone density stable compared to 2 years ago

## 2020-02-20 ENCOUNTER — APPOINTMENT (OUTPATIENT)
Dept: LAB | Facility: CLINIC | Age: 70
End: 2020-02-20
Payer: COMMERCIAL

## 2020-02-20 DIAGNOSIS — Z11.59 NEED FOR HEPATITIS C SCREENING TEST: ICD-10-CM

## 2020-02-20 DIAGNOSIS — E78.2 MIXED HYPERLIPIDEMIA: ICD-10-CM

## 2020-02-20 LAB
ALBUMIN SERPL BCP-MCNC: 4 G/DL (ref 3.5–5)
ALP SERPL-CCNC: 60 U/L (ref 46–116)
ALT SERPL W P-5'-P-CCNC: 18 U/L (ref 12–78)
ANION GAP SERPL CALCULATED.3IONS-SCNC: 3 MMOL/L (ref 4–13)
AST SERPL W P-5'-P-CCNC: 24 U/L (ref 5–45)
BASOPHILS # BLD AUTO: 0.03 THOUSANDS/ΜL (ref 0–0.1)
BASOPHILS NFR BLD AUTO: 1 % (ref 0–1)
BILIRUB SERPL-MCNC: 0.37 MG/DL (ref 0.2–1)
BUN SERPL-MCNC: 17 MG/DL (ref 5–25)
CALCIUM SERPL-MCNC: 9.3 MG/DL (ref 8.3–10.1)
CHLORIDE SERPL-SCNC: 106 MMOL/L (ref 100–108)
CHOLEST SERPL-MCNC: 241 MG/DL (ref 50–200)
CO2 SERPL-SCNC: 32 MMOL/L (ref 21–32)
CREAT SERPL-MCNC: 0.8 MG/DL (ref 0.6–1.3)
EOSINOPHIL # BLD AUTO: 0.18 THOUSAND/ΜL (ref 0–0.61)
EOSINOPHIL NFR BLD AUTO: 4 % (ref 0–6)
ERYTHROCYTE [DISTWIDTH] IN BLOOD BY AUTOMATED COUNT: 14 % (ref 11.6–15.1)
GFR SERPL CREATININE-BSD FRML MDRD: 75 ML/MIN/1.73SQ M
GLUCOSE P FAST SERPL-MCNC: 87 MG/DL (ref 65–99)
HCT VFR BLD AUTO: 40.5 % (ref 34.8–46.1)
HCV AB SER QL: NORMAL
HDLC SERPL-MCNC: 73 MG/DL
HGB BLD-MCNC: 12.9 G/DL (ref 11.5–15.4)
IMM GRANULOCYTES # BLD AUTO: 0.02 THOUSAND/UL (ref 0–0.2)
IMM GRANULOCYTES NFR BLD AUTO: 1 % (ref 0–2)
LDLC SERPL CALC-MCNC: 153 MG/DL (ref 0–100)
LYMPHOCYTES # BLD AUTO: 1.57 THOUSANDS/ΜL (ref 0.6–4.47)
LYMPHOCYTES NFR BLD AUTO: 38 % (ref 14–44)
MCH RBC QN AUTO: 29 PG (ref 26.8–34.3)
MCHC RBC AUTO-ENTMCNC: 31.9 G/DL (ref 31.4–37.4)
MCV RBC AUTO: 91 FL (ref 82–98)
MONOCYTES # BLD AUTO: 0.34 THOUSAND/ΜL (ref 0.17–1.22)
MONOCYTES NFR BLD AUTO: 8 % (ref 4–12)
NEUTROPHILS # BLD AUTO: 2.01 THOUSANDS/ΜL (ref 1.85–7.62)
NEUTS SEG NFR BLD AUTO: 48 % (ref 43–75)
NONHDLC SERPL-MCNC: 168 MG/DL
NRBC BLD AUTO-RTO: 0 /100 WBCS
PLATELET # BLD AUTO: 209 THOUSANDS/UL (ref 149–390)
PMV BLD AUTO: 10.9 FL (ref 8.9–12.7)
POTASSIUM SERPL-SCNC: 4 MMOL/L (ref 3.5–5.3)
PROT SERPL-MCNC: 7.3 G/DL (ref 6.4–8.2)
RBC # BLD AUTO: 4.45 MILLION/UL (ref 3.81–5.12)
SODIUM SERPL-SCNC: 141 MMOL/L (ref 136–145)
TRIGL SERPL-MCNC: 74 MG/DL
WBC # BLD AUTO: 4.15 THOUSAND/UL (ref 4.31–10.16)

## 2020-02-20 PROCEDURE — 85025 COMPLETE CBC W/AUTO DIFF WBC: CPT

## 2020-02-20 PROCEDURE — 80053 COMPREHEN METABOLIC PANEL: CPT

## 2020-02-20 PROCEDURE — 36415 COLL VENOUS BLD VENIPUNCTURE: CPT

## 2020-02-20 PROCEDURE — 86803 HEPATITIS C AB TEST: CPT

## 2020-02-20 PROCEDURE — 80061 LIPID PANEL: CPT

## 2020-03-02 ENCOUNTER — OFFICE VISIT (OUTPATIENT)
Dept: INTERNAL MEDICINE CLINIC | Facility: CLINIC | Age: 70
End: 2020-03-02
Payer: COMMERCIAL

## 2020-03-02 VITALS
BODY MASS INDEX: 21.68 KG/M2 | RESPIRATION RATE: 12 BRPM | HEIGHT: 62 IN | SYSTOLIC BLOOD PRESSURE: 150 MMHG | HEART RATE: 54 BPM | DIASTOLIC BLOOD PRESSURE: 90 MMHG | WEIGHT: 117.8 LBS

## 2020-03-02 DIAGNOSIS — M25.552 LEFT HIP PAIN: ICD-10-CM

## 2020-03-02 DIAGNOSIS — I10 ESSENTIAL HYPERTENSION: Primary | ICD-10-CM

## 2020-03-02 DIAGNOSIS — E78.2 MIXED HYPERLIPIDEMIA: ICD-10-CM

## 2020-03-02 DIAGNOSIS — Z12.39 SCREENING FOR BREAST CANCER: ICD-10-CM

## 2020-03-02 DIAGNOSIS — Z85.3 PERSONAL HISTORY OF BREAST CANCER: ICD-10-CM

## 2020-03-02 DIAGNOSIS — N28.1 RENAL CYST: ICD-10-CM

## 2020-03-02 PROCEDURE — 1036F TOBACCO NON-USER: CPT | Performed by: INTERNAL MEDICINE

## 2020-03-02 PROCEDURE — 99214 OFFICE O/P EST MOD 30 MIN: CPT | Performed by: INTERNAL MEDICINE

## 2020-03-02 PROCEDURE — 1101F PT FALLS ASSESS-DOCD LE1/YR: CPT | Performed by: INTERNAL MEDICINE

## 2020-03-02 PROCEDURE — 3008F BODY MASS INDEX DOCD: CPT | Performed by: INTERNAL MEDICINE

## 2020-03-02 PROCEDURE — 1160F RVW MEDS BY RX/DR IN RCRD: CPT | Performed by: INTERNAL MEDICINE

## 2020-03-02 PROCEDURE — 3288F FALL RISK ASSESSMENT DOCD: CPT | Performed by: INTERNAL MEDICINE

## 2020-03-02 PROCEDURE — 3080F DIAST BP >= 90 MM HG: CPT | Performed by: INTERNAL MEDICINE

## 2020-03-02 PROCEDURE — 3077F SYST BP >= 140 MM HG: CPT | Performed by: INTERNAL MEDICINE

## 2020-03-02 PROCEDURE — 4040F PNEUMOC VAC/ADMIN/RCVD: CPT | Performed by: INTERNAL MEDICINE

## 2020-03-02 NOTE — PROGRESS NOTES
Assessment/Plan:    Diagnoses and all orders for this visit:    Essential hypertension  -     CBC and differential; Future  -     Comprehensive metabolic panel; Future    Renal cyst    Mixed hyperlipidemia  -     Lipid panel; Future  -     TSH, 3rd generation with Free T4 reflex; Future    Personal history of breast cancer  -     Mammo screening bilateral w 3d & cad; Future    Left hip pain  -     XR hip/pelv 2-3 vws left if performed; Future    Screening for breast cancer  -     Mammo screening bilateral w 3d & cad; Future         Patient Instructions   Lab data reviewed in detail and compared prior    Hypertension-blood pressure is suboptimally controlled today-patient advised to monitor home blood pressures closely and follow-up with me through my chart in 3 weeks  May need to consider starting on medication as patient has ideal body weight, ideal exercise regimen as well as diet  Hyperlipidemia -10 year atherosclerotic risk is calculated at 11 8%, up from 7 3%  Current guidelines and implications discussed  Will hold off on statin treatment at this time and see how this trends in the next 6 months with low-cholesterol diet  Left hip pain is most consistent with osteoarthritis  Patient is not significantly limited by pain at this time  I have been advised Tylenol arthritis, 2 tabs in the morning up to 3 times daily as needed  Check x-rays to assess severity and consider referral to Orthopedics when Tylenol is not effective in alleviating the pain  Health maintenance-due for screening mammogram    Routine follow-up after labs in 6 months, blood pressure follow-up through my chart in 3 weeks  Subjective:      Patient ID: Breanna Crabtree is a 78 y o  female    F/u mmp and review labs  Feeling well, but notes L hip pain over past few mos  Was difficult to flex hip sitting, but now just pain w/ walking  Stairs aren't much of a problem  No injury    CT in '16 noted djd L hip and acetabular cyst  Not using any rx  Using stat bike more than walking as it helps improve pain  Walking or stat bike most days, wts, stretch, calesthetics at least 30 min 7 d per week  No cp/sob  HTN-no home bp's   HPL-exercising regularly and watching fats in diet  Red meat is mostly venison           Current Outpatient Medications:     Calcium 500 MG tablet, Take by mouth, Disp: , Rfl:     Multiple Vitamin (MULTI-VITAMIN DAILY) TABS, Take by mouth, Disp: , Rfl:     Progesterone Micronized 10 % CREA, Place on the skin, Disp: , Rfl:     Recent Results (from the past 1008 hour(s))   Hepatitis C antibody    Collection Time: 02/20/20  8:07 AM   Result Value Ref Range    Hepatitis C Ab Non-reactive Non-reactive   CBC and differential    Collection Time: 02/20/20  8:07 AM   Result Value Ref Range    WBC 4 15 (L) 4 31 - 10 16 Thousand/uL    RBC 4 45 3 81 - 5 12 Million/uL    Hemoglobin 12 9 11 5 - 15 4 g/dL    Hematocrit 40 5 34 8 - 46 1 %    MCV 91 82 - 98 fL    MCH 29 0 26 8 - 34 3 pg    MCHC 31 9 31 4 - 37 4 g/dL    RDW 14 0 11 6 - 15 1 %    MPV 10 9 8 9 - 12 7 fL    Platelets 690 133 - 523 Thousands/uL    nRBC 0 /100 WBCs    Neutrophils Relative 48 43 - 75 %    Immat GRANS % 1 0 - 2 %    Lymphocytes Relative 38 14 - 44 %    Monocytes Relative 8 4 - 12 %    Eosinophils Relative 4 0 - 6 %    Basophils Relative 1 0 - 1 %    Neutrophils Absolute 2 01 1 85 - 7 62 Thousands/µL    Immature Grans Absolute 0 02 0 00 - 0 20 Thousand/uL    Lymphocytes Absolute 1 57 0 60 - 4 47 Thousands/µL    Monocytes Absolute 0 34 0 17 - 1 22 Thousand/µL    Eosinophils Absolute 0 18 0 00 - 0 61 Thousand/µL    Basophils Absolute 0 03 0 00 - 0 10 Thousands/µL   Comprehensive metabolic panel    Collection Time: 02/20/20  8:07 AM   Result Value Ref Range    Sodium 141 136 - 145 mmol/L    Potassium 4 0 3 5 - 5 3 mmol/L    Chloride 106 100 - 108 mmol/L    CO2 32 21 - 32 mmol/L    ANION GAP 3 (L) 4 - 13 mmol/L    BUN 17 5 - 25 mg/dL    Creatinine 0 80 0 60 - 1 30 mg/dL    Glucose, Fasting 87 65 - 99 mg/dL    Calcium 9 3 8 3 - 10 1 mg/dL    AST 24 5 - 45 U/L    ALT 18 12 - 78 U/L    Alkaline Phosphatase 60 46 - 116 U/L    Total Protein 7 3 6 4 - 8 2 g/dL    Albumin 4 0 3 5 - 5 0 g/dL    Total Bilirubin 0 37 0 20 - 1 00 mg/dL    eGFR 75 ml/min/1 73sq m   Lipid panel    Collection Time: 02/20/20  8:07 AM   Result Value Ref Range    Cholesterol 241 (H) 50 - 200 mg/dL    Triglycerides 74 <=150 mg/dL    HDL, Direct 73 >=40 mg/dL    LDL Calculated 153 (H) 0 - 100 mg/dL    Non-HDL-Chol (CHOL-HDL) 168 mg/dl       The following portions of the patient's history were reviewed and updated as appropriate: allergies, current medications, past family history, past medical history, past social history, past surgical history and problem list      Review of Systems   Constitutional: Negative for appetite change, chills, diaphoresis, fatigue, fever and unexpected weight change  HENT: Negative for congestion, hearing loss and rhinorrhea  Eyes: Negative for visual disturbance  Respiratory: Negative for cough, chest tightness, shortness of breath and wheezing  Cardiovascular: Negative for chest pain, palpitations and leg swelling  Gastrointestinal: Negative for abdominal pain and blood in stool  Endocrine: Negative for cold intolerance, heat intolerance, polydipsia and polyuria  Genitourinary: Negative for difficulty urinating, dysuria, frequency and urgency  Musculoskeletal: Positive for arthralgias  Negative for myalgias  Skin: Negative for rash  Neurological: Negative for dizziness, weakness, light-headedness and headaches  Hematological: Does not bruise/bleed easily  Psychiatric/Behavioral: Negative for dysphoric mood and sleep disturbance  Objective:      Vitals:    03/02/20 1011   BP: 150/90   Pulse:    Resp:           Physical Exam   Constitutional: She is oriented to person, place, and time  She appears well-developed and well-nourished     HENT: Head: Normocephalic and atraumatic  Nose: Nose normal    Mouth/Throat: Oropharynx is clear and moist    Eyes: Pupils are equal, round, and reactive to light  Conjunctivae and EOM are normal  No scleral icterus  Neck: Normal range of motion  Neck supple  No JVD present  No tracheal deviation present  No thyromegaly present  Cardiovascular: Normal rate, regular rhythm and intact distal pulses  Exam reveals no gallop and no friction rub  No murmur heard  Pulmonary/Chest: Effort normal and breath sounds normal  No respiratory distress  She has no wheezes  She has no rales  Musculoskeletal: She exhibits no edema or deformity  Bilateral hips nontender to palpate, full range of motion of left hip with increased pain with internal rotation  Negative straight leg raise  Lymphadenopathy:     She has no cervical adenopathy  Neurological: She is alert and oriented to person, place, and time  No cranial nerve deficit  Skin: Skin is warm and dry  No rash noted  No erythema  No pallor  Psychiatric: She has a normal mood and affect   Her behavior is normal  Judgment and thought content normal

## 2020-03-02 NOTE — PATIENT INSTRUCTIONS
Lab data reviewed in detail and compared prior    Hypertension-blood pressure is suboptimally controlled today-patient advised to monitor home blood pressures closely and follow-up with me through my chart in 3 weeks  May need to consider starting on medication as patient has ideal body weight, ideal exercise regimen as well as diet  Hyperlipidemia -10 year atherosclerotic risk is calculated at 11 8%, up from 7 3%  Current guidelines and implications discussed  Will hold off on statin treatment at this time and see how this trends in the next 6 months with low-cholesterol diet  Left hip pain is most consistent with osteoarthritis  Patient is not significantly limited by pain at this time  I have been advised Tylenol arthritis, 2 tabs in the morning up to 3 times daily as needed  Check x-rays to assess severity and consider referral to Orthopedics when Tylenol is not effective in alleviating the pain  Health maintenance-due for screening mammogram    Routine follow-up after labs in 6 months, blood pressure follow-up through my chart in 3 weeks

## 2020-03-05 ENCOUNTER — APPOINTMENT (OUTPATIENT)
Dept: RADIOLOGY | Facility: CLINIC | Age: 70
End: 2020-03-05
Payer: COMMERCIAL

## 2020-03-05 DIAGNOSIS — M25.552 LEFT HIP PAIN: ICD-10-CM

## 2020-03-05 PROCEDURE — 73502 X-RAY EXAM HIP UNI 2-3 VIEWS: CPT

## 2020-03-10 ENCOUNTER — TELEPHONE (OUTPATIENT)
Dept: INTERNAL MEDICINE CLINIC | Facility: CLINIC | Age: 70
End: 2020-03-10

## 2020-03-10 NOTE — TELEPHONE ENCOUNTER
----- Message from Junior Le MD sent at 3/10/2020 11:37 AM EDT -----  Notify-xray shows moderate osteoarthritic changes  She may benefit from PT, lmk if she would like me to order

## 2020-03-11 DIAGNOSIS — M25.552 LEFT HIP PAIN: Primary | ICD-10-CM

## 2020-03-11 NOTE — TELEPHONE ENCOUNTER
Yes, patient wants to go ahead with phy therapy  [facility] # was given    Please call patient to let her know the order has been put in, so she can schedule

## 2020-03-20 ENCOUNTER — EVALUATION (OUTPATIENT)
Dept: PHYSICAL THERAPY | Facility: CLINIC | Age: 70
End: 2020-03-20
Payer: COMMERCIAL

## 2020-03-20 DIAGNOSIS — M25.552 LEFT HIP PAIN: Primary | ICD-10-CM

## 2020-03-20 PROCEDURE — 97140 MANUAL THERAPY 1/> REGIONS: CPT | Performed by: PHYSICAL THERAPIST

## 2020-03-20 PROCEDURE — 97161 PT EVAL LOW COMPLEX 20 MIN: CPT | Performed by: PHYSICAL THERAPIST

## 2020-03-20 NOTE — PROGRESS NOTES
PT Evaluation     Today's date: 3/20/2020  Patient name: Alex Zamora  :   MRN: [id number]  Referring provider: Danna Viramontes MD  Dx:   Encounter Diagnosis     ICD-10-CM    1  Left hip pain M25 552 Ambulatory referral to Physical Therapy                  Assessment  Assessment details: Patient was provided a home exercise program and demonstrated an understanding of exercises  Patient was advised to stop performing home exercise program if symptoms increase or new complaints developed  Verbal understanding demonstrated regarding home exercise program instructions  Patient would benefit from skilled physical therapy services for prescribed exercises, manual interventions, neuromuscular re-education, education, and modalities as deemed appropriate to assist patient in achieving their maximum level of function  Patient presents with c/o left anterior hip pain  She is point tender left anterior hip, hip flexor, proximal rectus femoris  She has bilateral hip weakness , right knee genu varum  She walks with a guarded/ cautious gait  She is however highly motivated to reduce / abolish her pain  Impairments: abnormal gait, abnormal or restricted ROM, abnormal movement, activity intolerance, impaired physical strength, lacks appropriate home exercise program and pain with function  Understanding of Dx/Px/POC: good  Goals  STG   1  Patient will demonstrate independence and competence with HEP 2 -4 weeks  2  Patient will report > 25-50% reduced pain 2-4 weeks    LTG   1  Patient will report improvements with both functional and recreational abilities  4-6 weeks  2  Patient will demonstrate improved motor function  4-6 weeks  3   Palpation will reveal (-) tenderness anterior left thigh/ hip   4-6 weeks       Plan  Plan details: Patient response to treatment will be monitored each session and progressed accordingly    Thank you for this referral    Patient would benefit from: skilled physical therapy  Planned therapy interventions: IADL retraining, joint mobilization, manual therapy, patient education, postural training, strengthening, stretching, therapeutic exercise, flexibility, home exercise program and neuromuscular re-education  Frequency: 2x week  Duration in weeks: 8  Treatment plan discussed with: patient        Subjective Evaluation    History of Present Illness  Mechanism of injury: Patient reports quick onset of anterior left hip pain 5-6 weeks ago  Insidious onset  She notes that she had a prior hip problem which ended up being a cyst in the hip joint  At that time she went to PT which she reports was helpful  X-rays reveal moderate arthritis  Denies back pain / denies right hip pain  She reports that she had throbbing pain down left le - bottom of foot which subsided once she was up and moving  She does report anterior thigh soreness as well  Pain  Current pain ratin  At best pain ratin  At worst pain ratin  Quality: sharp and burning  Progression: no change    Social Support  L[facility]  Lives with: spouse    Working: volunteer work   Patient Goals  Patient goals for therapy: decreased pain, increased motion and return to sport/leisure activities  Patient goal: "How can i prevent this"   "I would like to walk without favoring the leg"  Objective     Observations     Additional Observation Details  Patient with right knee genu varum > left  Palpation   Left   No palpable tenderness to the TFL  Tenderness of the iliopsoas and rectus femoris  Tenderness     Left Hip   No tenderness in the ASIS       Lumbar Screen  Lumbar range of motion within normal limits with the following exceptions:Ff-  Full movement, NE  Stand back extension - mod limitation - NE    Neurological Testing     Sensation     Hip   Left Hip   Intact: light touch    Right Hip   Intact: light touch    Active Range of Motion   Left Hip   Flexion: WFL  Extension: with pain  Abduction: WFL  Adduction: WFL  External rotation (90/90): 30 degrees   Internal rotation (90/90): 35 degrees with pain    Passive Range of Motion   Left Hip   Flexion: WFL  Extension: with pain  External rotation (90/90): with pain  Internal rotation (90/90): with pain    Strength/Myotome Testing     Left Hip   Planes of Motion   Flexion: 4-  Extension: 4-  Abduction: 4  Adduction: 4+  External rotation: 4  Internal rotation: 4    Isolated Muscles   Iliopsoas: 4-    Right Hip   Planes of Motion   Flexion: 4-  Extension: 4-  Abduction: 4  Adduction: 4+  External rotation: 4  Internal rotation: 4    Tests     Left Hip   Negative JOSEP, scour, SI compression and SI distraction  Magnus: Positive  90/90 SLR: Negative  SLR: Negative  General Comments:      Hip Comments   GAIT - slowed jj, decreased stance time left, decreased hip ext left  With shortened step stride observed                 Precautions: anterior left hip pain      Manual  3/20            Hip flexor stretch in s/l  db                                      IASTIM             Foam roller left anterior hip  thigh db                Exercise Diary  3/20            bike                          Hip flexor stretch on mat in highkneel             Supine hip add stretch 20s x 5            Hip flexor stretch off edge of table  20s x 3                                                                                                                                                                                                                    Modalities  3/20            mhp left hip Db

## 2020-03-25 ENCOUNTER — OFFICE VISIT (OUTPATIENT)
Dept: PHYSICAL THERAPY | Facility: CLINIC | Age: 70
End: 2020-03-25
Payer: COMMERCIAL

## 2020-03-25 DIAGNOSIS — M25.552 LEFT HIP PAIN: Primary | ICD-10-CM

## 2020-03-25 PROCEDURE — 97140 MANUAL THERAPY 1/> REGIONS: CPT

## 2020-03-25 PROCEDURE — 97112 NEUROMUSCULAR REEDUCATION: CPT

## 2020-03-25 PROCEDURE — 97110 THERAPEUTIC EXERCISES: CPT

## 2020-03-25 NOTE — PROGRESS NOTES
Daily Note     Today's date: 3/25/2020  Patient name: Keven Delgadillo  : [id number]  MRN: [id number]  Referring provider: Narcisa Ferro MD  Dx:   Encounter Diagnosis     ICD-10-CM    1  Left hip pain M25 552                   Subjective: Upon presentation, patient reported difficulty sleeping in the evening secondary to left hip discomfort  SPR this am =1/10  Objective: See treatment diary below    Manual  3/20 3/25           Hip flexor stretch in s/l  db LA  PTA                                     IASTIM             Foam roller left anterior hip  thigh db LA  PTA               Exercise Diary  3/20 3/25           bike  10'                        Hip flexor stretch on mat in highkneel  :30  5x           Supine hip add stretch 20s x 5 :30  5x           Hip flexor stretch off edge of table  20s x 3  :30  3x           Clamshells  :03  20x           Standing: hip flexion, ext, abd   B:  10x each                        Seated PB:   abd bracing  :05  10x           Seated PB: Hip flexion, knee extension  10x2 each                        Standing hip adduction stretch  :30  3x                                                                                                                        Modalities  3/20 3/25           mhp left hip Db  10'                                              Assessment: Tolerated treatment demonstrating good understanding of present HEP via visual feedback  Patient exhibited good technique with therapeutic exercises  Patient denied exacerbation of left hip pain symptoms post progression of today's program or limitations in exercise performance  Plan: Issued written HEP with RTB for compliance with program at home    Patient is to continue with present program idependently at home at this time secondary to patient's safety and Covid-19 requirements by law     Precautions: anterior left hip pain

## 2020-03-30 ENCOUNTER — APPOINTMENT (OUTPATIENT)
Dept: PHYSICAL THERAPY | Facility: CLINIC | Age: 70
End: 2020-03-30
Payer: COMMERCIAL

## 2020-04-29 NOTE — PROGRESS NOTES
PT Evaluation  and PT Discharge    Today's date: 2020  Patient name: Raul Mcclain  :   MRN: [id number]  Referring provider: Bryan Cevallos MD  Dx:   Encounter Diagnosis     ICD-10-CM    1  Left hip pain M25 [id number]        Assessment  Assessment details: Patient was seen for 1 additional visit after IE - she would like to continue with HEP and was issued written HEP as well as T-band for le pres  Patient reported pain 1/10 on 1st follow up visit  Understanding of Dx/Px/POC: good  Goals  STG   1  Patient will demonstrate independence and competence with HEP 2 -4 weeks   MET  2  Patient will report > 25-50% reduced pain 2-4 weeks   Partially MET    LTG   1  Patient will report improvements with both functional and recreational abilities  4-6 weeks   Partially MET  2  Patient will demonstrate improved motor function  4-6 weeks  Partially MET  3  Palpation will reveal (-) tenderness anterior left thigh/ hip   4-6 weeks  Partially MET    Plan  Plan details: Thank you for this referral    Planned therapy interventions: home exercise program  Treatment plan discussed with: patient        Subjective Evaluation    History of Present Illness  Mechanism of injury:     Pain  Current pain ratin  At best pain ratin  At worst pain ratin  Quality: burning and dull ache  Progression: improved    Social Support  Lives in: [facility]  Lives with: spouse    Working: volunteer work   Patient Goals  Patient goals for therapy: decreased pain, increased motion and return to sport/leisure activities  Patient goal: "How can i prevent this"   "I would like to walk without favoring the leg"  Objective     Observations     Additional Observation Details  Patient with right knee genu varum > left  Palpation   Left   No palpable tenderness to the TFL  Tenderness of the iliopsoas and rectus femoris  Tenderness     Left Hip   No tenderness in the ASIS       Lumbar Screen  Lumbar range of motion within normal limits with the following exceptions:Ff-  Full movement, NE  Stand back extension - mod limitation - NE    Neurological Testing     Sensation     Hip   Left Hip   Intact: light touch    Right Hip   Intact: light touch    Active Range of Motion   Left Hip   Flexion: WFL  Extension: with pain  Abduction: WFL  Adduction: WFL  External rotation (90/90): 30 degrees   Internal rotation (90/90): 35 degrees with pain    Passive Range of Motion   Left Hip   Flexion: WFL  Extension: with pain  External rotation (90/90): with pain  Internal rotation (90/90): with pain    Strength/Myotome Testing     Left Hip   Planes of Motion   Flexion: 4-  Extension: 4-  Abduction: 4  Adduction: 4+  External rotation: 4  Internal rotation: 4    Isolated Muscles   Iliopsoas: 4-    Right Hip   Planes of Motion   Flexion: 4-  Extension: 4-  Abduction: 4  Adduction: 4+  External rotation: 4  Internal rotation: 4    Tests     Left Hip   Negative JOSEPjennifer, SI compression and SI distraction  Magnus: Positive  90/90 SLR: Negative  SLR: Negative  General Comments:      Hip Comments   GAIT - slowed jj, decreased stance time left, decreased hip ext left  With shortened step stride observed

## 2020-06-12 ENCOUNTER — OFFICE VISIT (OUTPATIENT)
Dept: INTERNAL MEDICINE CLINIC | Facility: CLINIC | Age: 70
End: 2020-06-12
Payer: COMMERCIAL

## 2020-06-12 ENCOUNTER — TELEPHONE (OUTPATIENT)
Dept: INTERNAL MEDICINE CLINIC | Facility: CLINIC | Age: 70
End: 2020-06-12

## 2020-06-12 VITALS
HEIGHT: 62 IN | RESPIRATION RATE: 14 BRPM | DIASTOLIC BLOOD PRESSURE: 82 MMHG | BODY MASS INDEX: 21.27 KG/M2 | WEIGHT: 115.6 LBS | HEART RATE: 74 BPM | SYSTOLIC BLOOD PRESSURE: 118 MMHG | TEMPERATURE: 98.1 F

## 2020-06-12 DIAGNOSIS — D69.6 THROMBOCYTOPENIA (HCC): ICD-10-CM

## 2020-06-12 DIAGNOSIS — A69.20 ECM (ERYTHEMA CHRONICUM MIGRANS): Primary | ICD-10-CM

## 2020-06-12 PROCEDURE — 4040F PNEUMOC VAC/ADMIN/RCVD: CPT | Performed by: NURSE PRACTITIONER

## 2020-06-12 PROCEDURE — 99213 OFFICE O/P EST LOW 20 MIN: CPT | Performed by: NURSE PRACTITIONER

## 2020-06-12 PROCEDURE — 1036F TOBACCO NON-USER: CPT | Performed by: NURSE PRACTITIONER

## 2020-06-12 PROCEDURE — 3008F BODY MASS INDEX DOCD: CPT | Performed by: NURSE PRACTITIONER

## 2020-06-12 PROCEDURE — 1160F RVW MEDS BY RX/DR IN RCRD: CPT | Performed by: NURSE PRACTITIONER

## 2020-06-12 PROCEDURE — 3074F SYST BP LT 130 MM HG: CPT | Performed by: NURSE PRACTITIONER

## 2020-06-12 PROCEDURE — 3079F DIAST BP 80-89 MM HG: CPT | Performed by: NURSE PRACTITIONER

## 2020-06-12 RX ORDER — DOXYCYCLINE HYCLATE 100 MG
100 TABLET ORAL 2 TIMES DAILY
Qty: 28 TABLET | Refills: 0 | Status: SHIPPED | OUTPATIENT
Start: 2020-06-12 | End: 2020-06-26

## 2020-06-15 ENCOUNTER — TELEPHONE (OUTPATIENT)
Dept: INTERNAL MEDICINE CLINIC | Facility: CLINIC | Age: 70
End: 2020-06-15

## 2020-08-31 ENCOUNTER — APPOINTMENT (OUTPATIENT)
Dept: LAB | Facility: CLINIC | Age: 70
End: 2020-08-31
Payer: COMMERCIAL

## 2020-08-31 DIAGNOSIS — E78.2 MIXED HYPERLIPIDEMIA: ICD-10-CM

## 2020-08-31 DIAGNOSIS — I10 ESSENTIAL HYPERTENSION: ICD-10-CM

## 2020-08-31 LAB
ALBUMIN SERPL BCP-MCNC: 4 G/DL (ref 3.5–5)
ALP SERPL-CCNC: 62 U/L (ref 46–116)
ALT SERPL W P-5'-P-CCNC: 20 U/L (ref 12–78)
ANION GAP SERPL CALCULATED.3IONS-SCNC: 2 MMOL/L (ref 4–13)
AST SERPL W P-5'-P-CCNC: 22 U/L (ref 5–45)
BASOPHILS # BLD AUTO: 0.04 THOUSANDS/ΜL (ref 0–0.1)
BASOPHILS NFR BLD AUTO: 1 % (ref 0–1)
BILIRUB SERPL-MCNC: 0.47 MG/DL (ref 0.2–1)
BUN SERPL-MCNC: 14 MG/DL (ref 5–25)
CALCIUM SERPL-MCNC: 9.1 MG/DL (ref 8.3–10.1)
CHLORIDE SERPL-SCNC: 106 MMOL/L (ref 100–108)
CHOLEST SERPL-MCNC: 239 MG/DL (ref 50–200)
CO2 SERPL-SCNC: 32 MMOL/L (ref 21–32)
CREAT SERPL-MCNC: 0.75 MG/DL (ref 0.6–1.3)
EOSINOPHIL # BLD AUTO: 0.21 THOUSAND/ΜL (ref 0–0.61)
EOSINOPHIL NFR BLD AUTO: 4 % (ref 0–6)
ERYTHROCYTE [DISTWIDTH] IN BLOOD BY AUTOMATED COUNT: 13.7 % (ref 11.6–15.1)
GFR SERPL CREATININE-BSD FRML MDRD: 80 ML/MIN/1.73SQ M
GLUCOSE P FAST SERPL-MCNC: 97 MG/DL (ref 65–99)
HCT VFR BLD AUTO: 42 % (ref 34.8–46.1)
HDLC SERPL-MCNC: 74 MG/DL
HGB BLD-MCNC: 13.8 G/DL (ref 11.5–15.4)
IMM GRANULOCYTES # BLD AUTO: 0.01 THOUSAND/UL (ref 0–0.2)
IMM GRANULOCYTES NFR BLD AUTO: 0 % (ref 0–2)
LDLC SERPL CALC-MCNC: 146 MG/DL (ref 0–100)
LYMPHOCYTES # BLD AUTO: 2.17 THOUSANDS/ΜL (ref 0.6–4.47)
LYMPHOCYTES NFR BLD AUTO: 42 % (ref 14–44)
MCH RBC QN AUTO: 29.5 PG (ref 26.8–34.3)
MCHC RBC AUTO-ENTMCNC: 32.9 G/DL (ref 31.4–37.4)
MCV RBC AUTO: 90 FL (ref 82–98)
MONOCYTES # BLD AUTO: 0.45 THOUSAND/ΜL (ref 0.17–1.22)
MONOCYTES NFR BLD AUTO: 9 % (ref 4–12)
NEUTROPHILS # BLD AUTO: 2.26 THOUSANDS/ΜL (ref 1.85–7.62)
NEUTS SEG NFR BLD AUTO: 44 % (ref 43–75)
NONHDLC SERPL-MCNC: 165 MG/DL
NRBC BLD AUTO-RTO: 0 /100 WBCS
PLATELET # BLD AUTO: 204 THOUSANDS/UL (ref 149–390)
PMV BLD AUTO: 10.7 FL (ref 8.9–12.7)
POTASSIUM SERPL-SCNC: 4 MMOL/L (ref 3.5–5.3)
PROT SERPL-MCNC: 7.5 G/DL (ref 6.4–8.2)
RBC # BLD AUTO: 4.68 MILLION/UL (ref 3.81–5.12)
SODIUM SERPL-SCNC: 140 MMOL/L (ref 136–145)
TRIGL SERPL-MCNC: 95 MG/DL
TSH SERPL DL<=0.05 MIU/L-ACNC: 2.09 UIU/ML (ref 0.36–3.74)
WBC # BLD AUTO: 5.14 THOUSAND/UL (ref 4.31–10.16)

## 2020-08-31 PROCEDURE — 85025 COMPLETE CBC W/AUTO DIFF WBC: CPT

## 2020-08-31 PROCEDURE — 84443 ASSAY THYROID STIM HORMONE: CPT

## 2020-08-31 PROCEDURE — 80061 LIPID PANEL: CPT

## 2020-08-31 PROCEDURE — 80053 COMPREHEN METABOLIC PANEL: CPT

## 2020-08-31 PROCEDURE — 36415 COLL VENOUS BLD VENIPUNCTURE: CPT

## 2020-09-10 ENCOUNTER — OFFICE VISIT (OUTPATIENT)
Dept: INTERNAL MEDICINE CLINIC | Facility: CLINIC | Age: 70
End: 2020-09-10
Payer: COMMERCIAL

## 2020-09-10 VITALS
WEIGHT: 117.4 LBS | HEART RATE: 80 BPM | DIASTOLIC BLOOD PRESSURE: 80 MMHG | HEIGHT: 62 IN | SYSTOLIC BLOOD PRESSURE: 148 MMHG | RESPIRATION RATE: 12 BRPM | TEMPERATURE: 97.6 F | BODY MASS INDEX: 21.6 KG/M2

## 2020-09-10 DIAGNOSIS — Z85.3 PERSONAL HISTORY OF BREAST CANCER: ICD-10-CM

## 2020-09-10 DIAGNOSIS — M25.552 LEFT HIP PAIN: ICD-10-CM

## 2020-09-10 DIAGNOSIS — E78.2 MIXED HYPERLIPIDEMIA: ICD-10-CM

## 2020-09-10 DIAGNOSIS — I10 ESSENTIAL HYPERTENSION: Primary | ICD-10-CM

## 2020-09-10 PROCEDURE — 1160F RVW MEDS BY RX/DR IN RCRD: CPT | Performed by: INTERNAL MEDICINE

## 2020-09-10 PROCEDURE — 1170F FXNL STATUS ASSESSED: CPT | Performed by: INTERNAL MEDICINE

## 2020-09-10 PROCEDURE — 1036F TOBACCO NON-USER: CPT | Performed by: INTERNAL MEDICINE

## 2020-09-10 PROCEDURE — G0439 PPPS, SUBSEQ VISIT: HCPCS | Performed by: INTERNAL MEDICINE

## 2020-09-10 PROCEDURE — 3079F DIAST BP 80-89 MM HG: CPT | Performed by: INTERNAL MEDICINE

## 2020-09-10 PROCEDURE — 1125F AMNT PAIN NOTED PAIN PRSNT: CPT | Performed by: INTERNAL MEDICINE

## 2020-09-10 PROCEDURE — 3725F SCREEN DEPRESSION PERFORMED: CPT | Performed by: INTERNAL MEDICINE

## 2020-09-10 PROCEDURE — 99214 OFFICE O/P EST MOD 30 MIN: CPT | Performed by: INTERNAL MEDICINE

## 2020-09-10 NOTE — PROGRESS NOTES
Assessment and Plan:     Problem List Items Addressed This Visit     None           Preventive health issues were discussed with patient, and age appropriate screening tests were ordered as noted in patient's After Visit Summary  Personalized health advice and appropriate referrals for health education or preventive services given if needed, as noted in patient's After Visit Summary  History of Present Illness:     Patient presents for Medicare Annual Wellness visit    Patient Care Team:  Agata Gonzalez MD as PCP - Navin Salinas MD as PCP - [address] [place] [address] (RTE)  MD Beau Mon MD     Problem List:     Patient Active Problem List   Diagnosis    Hyperlipidemia    Hypertension    Microhematuria    Osteopenia    Personal history of breast cancer    Renal cyst    Thrombocytopenia [facility])      Past Medical and Surgical History:     Past Medical History:   Diagnosis Date    Breast cancer ([facility] [address] )     Hematuria     LAST ASSESSED [id number]     Past Surgical History:   Procedure Laterality Date    BREAST LUMPECTOMY      LYMPH NODE BIOPSY      MOHS SURGERY        Family History:     History reviewed  No pertinent family history     Social History:     E-Cigarette/Vaping    E-Cigarette Use Never User      E-Cigarette/Vaping Substances    Nicotine No     THC No     CBD No     Flavoring No     Other No     Unknown No      Social History     Socioeconomic History    Marital status: /Civil Union     Spouse name: None    Number of children: None    Years of education: None    Highest education level: None   Occupational History    None   Social Needs    Financial resource strain: None    Food insecurity     Worry: None     Inability: None    Transportation needs     Medical: None     Non-medical: None   Tobacco Use    Smoking status: Never Smoker    Smokeless tobacco: Never Used   Substance and Sexual Activity    Alcohol use: Yes     Frequency: 4 or more times a week     Drinks per session: 1 or 2     Binge frequency: Never    Drug use: No    Sexual activity: Not Currently   Lifestyle    Physical activity     Days per week: 6 days     Minutes per session: 30 min    Stress: Not at all   Relationships    Social connections     Talks on phone: None     Gets together: None     Attends Synagogue service: None     Active member of club or organization: None     Attends meetings of clubs or organizations: None     Relationship status: None    Intimate partner violence     Fear of current or ex partner: None     Emotionally abused: None     Physically abused: None     Forced sexual activity: None   Other Topics Concern    None   Social History Narrative    NO ADVANCED DIRECTIVES       Medications and Allergies:     Current Outpatient Medications   Medication Sig Dispense Refill    Calcium 500 MG tablet Take by mouth      Multiple Vitamin (MULTI-VITAMIN DAILY) TABS Take by mouth      Progesterone Micronized 10 % CREA Place on the skin daily        No current facility-administered medications for this visit        No Known Allergies   Immunizations:     Immunization History   Administered Date(s) Administered    INFLUENZA 12/17/2018    Influenza Split High Dose Preservative Free IM 12/17/2018    Influenza TIV (IM) 1950    Pneumococcal Conjugate 13-Valent 12/09/2015    Pneumococcal Polysaccharide PPV23 07/20/2017    Tdap 1950    Zoster 01/01/2012    Zoster Vaccine Recombinant 01/01/2012    influenza, trivalent, adjuvanted 11/12/2019      Health Maintenance:         Topic Date Due    MAMMOGRAM  09/05/2019    DXA SCAN  09/17/2021    Hepatitis C Screening  Completed         Topic Date Due    DTaP,Tdap,and Td Vaccines (1 - Tdap) 08/14/1970    Influenza Vaccine  07/01/2020      Medicare Health Risk Assessment:     /80 (BP Location: Left arm, Patient Position: Sitting)   Pulse 80   Temp 97 6 °F (36 4 °C) (Temporal)   Resp 12   Ht 5' 2" (1 575 m)   Wt 53 3 kg (117 lb 6 4 oz)   BMI 21 47 kg/m²      Mattie Gaona is here for her Subsequent Wellness visit  Health Risk Assessment:   Patient rates overall health as very good  Patient feels that their physical health rating is slightly worse  Eyesight was rated as same  Hearing was rated as same  Patient feels that their emotional and mental health rating is same  Pain experienced in the last 7 days has been some  Patient's pain rating has been 2/10  Patient states that she has experienced no weight loss or gain in last 6 months  Depression Screening:   PHQ-2 Score: 0      Fall Risk Screening: In the past year, patient has experienced: no history of falling in past year      Urinary Incontinence Screening:   Patient has leaked urine accidently in the last six months  Home Safety:  Patient has trouble with stairs inside or outside of their home  Patient has working smoke alarms and has no working carbon monoxide detector  Home safety hazards include: loose rugs on the floor, poor household lighting and uneven floors  Nutrition:   Current diet is Regular, Low Saturated Fat and Limited junk food  Medications:   Patient is currently taking over-the-counter supplements  OTC medications include: see medication list  Patient is able to manage medications  Activities of Daily Living (ADLs)/Instrumental Activities of Daily Living (IADLs):   Walk and transfer into and out of bed and chair?: Yes  Dress and groom yourself?: Yes    Bathe or shower yourself?: Yes    Feed yourself? Yes  Do your laundry/housekeeping?: Yes  Manage your money, pay your bills and track your expenses?: Yes  Make your own meals?: Yes    Do your own shopping?: Yes    Previous Hospitalizations:   Any hospitalizations or ED visits within the last 12 months?: No      Advance Care Planning:   Living will: Yes    Durable POA for healthcare:  Yes    Advanced directive: Yes      Comments: vickie    Cognitive Screening:   Provider or family/friend/caregiver concerned regarding cognition?: No    PREVENTIVE SCREENINGS      Cardiovascular Screening:    General: Screening Not Indicated and History Lipid Disorder      Diabetes Screening:     General: Screening Current      Colorectal Cancer Screening:     General: Screening Current      Breast Cancer Screening:     General: History Breast Cancer      Cervical Cancer Screening:    General: Screening Not Indicated      Osteoporosis Screening:    General: Screening Current      Abdominal Aortic Aneurysm (AAA) Screening:        General: Screening Not Indicated      Lung Cancer Screening:     General: Screening Not Indicated      Hepatitis C Screening:    General: Screening Current      Nupur Rhoades MD

## 2020-09-10 NOTE — PROGRESS NOTES
Assessment/Plan:    Diagnoses and all orders for this visit:    Essential hypertension  -     CT coronary calcium score; Future  -     CBC and differential; Future  -     Comprehensive metabolic panel; Future  -     Lipid panel; Future    Mixed hyperlipidemia  -     CT coronary calcium score; Future  -     CBC and differential; Future  -     Comprehensive metabolic panel; Future  -     Lipid panel; Future    Personal history of breast cancer    Left hip pain  -     Ambulatory referral to Physical Therapy; Future         Patient Instructions   Lab data reviewed in detail and compared prior    Hyperlipidemia -10 year atherosclerotic risk greater than 13  Implications discussed  We discussed initiating statin verses watchful waiting versus coronary calcium scoring and have opted for checking coronary calcium score with CT  If not 0 will plan to initiate statin once we have rectified the left hip issue and weakness  Left hip pain with moderate arthritis and intermittent weakness-referral back to physical therapy    Hypertension-home blood pressures remained stable  Slightly elevated in office today  Monitor home blood pressures at least once per week and contact me if running greater than 140/90  Health maintenance-we discussed getting Shingrix as well as flu shot mid October, otherwise up-to-date    Routine lab follow-up in 6 months  Will follow coronary calcium scoring by phone  Subjective:      Patient ID: Naida Carpio is a 69 y o  female    F/u mmp and review labs and AWV  Feeling generally well  L hip pain continues to be an issue  Pain improved w/ PT, but still w/ pain and stiffness  Still noting intermittent weakness for unclear reason  Walking or stat bike most days, wts, stretch, calesthetics at least 30 min 7 d per week  No cp/sob  HTN-Home bp's have all been 110-130/60-70's  HPL-exercising regularly and watching fats in diet  Red meat is mostly venison    Red meat about 2x per week         Current Outpatient Medications:     Calcium 500 MG tablet, Take by mouth, Disp: , Rfl:     Multiple Vitamin (MULTI-VITAMIN DAILY) TABS, Take by mouth, Disp: , Rfl:     Progesterone Micronized 10 % CREA, Place on the skin daily , Disp: , Rfl:     Recent Results (from the past 1008 hour(s))   CBC and differential    Collection Time: 08/31/20  7:40 AM   Result Value Ref Range    WBC 5 14 4 31 - 10 16 Thousand/uL    RBC 4 68 3 81 - 5 12 Million/uL    Hemoglobin 13 8 11 5 - 15 4 g/dL    Hematocrit 42 0 34 8 - 46 1 %    MCV 90 82 - 98 fL    MCH 29 5 26 8 - 34 3 pg    MCHC 32 9 31 4 - 37 4 g/dL    RDW 13 7 11 6 - 15 1 %    MPV 10 7 8 9 - 12 7 fL    Platelets 802 935 - 975 Thousands/uL    nRBC 0 /100 WBCs    Neutrophils Relative 44 43 - 75 %    Immat GRANS % 0 0 - 2 %    Lymphocytes Relative 42 14 - 44 %    Monocytes Relative 9 4 - 12 %    Eosinophils Relative 4 0 - 6 %    Basophils Relative 1 0 - 1 %    Neutrophils Absolute 2 26 1 85 - 7 62 Thousands/µL    Immature Grans Absolute 0 01 0 00 - 0 20 Thousand/uL    Lymphocytes Absolute 2 17 0 60 - 4 47 Thousands/µL    Monocytes Absolute 0 45 0 17 - 1 22 Thousand/µL    Eosinophils Absolute 0 21 0 00 - 0 61 Thousand/µL    Basophils Absolute 0 04 0 00 - 0 10 Thousands/µL   Comprehensive metabolic panel    Collection Time: 08/31/20  7:40 AM   Result Value Ref Range    Sodium 140 136 - 145 mmol/L    Potassium 4 0 3 5 - 5 3 mmol/L    Chloride 106 100 - 108 mmol/L    CO2 32 21 - 32 mmol/L    ANION GAP 2 (L) 4 - 13 mmol/L    BUN 14 5 - 25 mg/dL    Creatinine 0 75 0 60 - 1 30 mg/dL    Glucose, Fasting 97 65 - 99 mg/dL    Calcium 9 1 8 3 - 10 1 mg/dL    AST 22 5 - 45 U/L    ALT 20 12 - 78 U/L    Alkaline Phosphatase 62 46 - 116 U/L    Total Protein 7 5 6 4 - 8 2 g/dL    Albumin 4 0 3 5 - 5 0 g/dL    Total Bilirubin 0 47 0 20 - 1 00 mg/dL    eGFR 80 ml/min/1 73sq m   Lipid panel    Collection Time: 08/31/20  7:40 AM   Result Value Ref Range    Cholesterol 239 (H) 50 - 200 mg/dL    Triglycerides 95 <=150 mg/dL    HDL, Direct 74 >=40 mg/dL    LDL Calculated 146 (H) 0 - 100 mg/dL    Non-HDL-Chol (CHOL-HDL) 165 mg/dl   TSH, 3rd generation with Free T4 reflex    Collection Time: 08/31/20  7:40 AM   Result Value Ref Range    TSH 3RD GENERATON 2 090 0 358 - 3 740 uIU/mL       The following portions of the patient's history were reviewed and updated as appropriate: allergies, current medications, past family history, past medical history, past social history, past surgical history and problem list      Review of Systems   Constitutional: Negative for appetite change, chills, diaphoresis, fatigue, fever and unexpected weight change  HENT: Negative for congestion, hearing loss and rhinorrhea  Eyes: Negative for visual disturbance  Respiratory: Negative for cough, chest tightness, shortness of breath and wheezing  Cardiovascular: Negative for chest pain, palpitations and leg swelling  Gastrointestinal: Negative for abdominal pain and blood in stool  Endocrine: Negative for cold intolerance, heat intolerance, polydipsia and polyuria  Genitourinary: Negative for difficulty urinating, dysuria, frequency and urgency  Musculoskeletal: Negative for arthralgias and myalgias  Skin: Negative for rash  Neurological: Negative for dizziness, weakness, light-headedness and headaches  Hematological: Does not bruise/bleed easily  Psychiatric/Behavioral: Negative for dysphoric mood and sleep disturbance  Objective:      Vitals:    09/10/20 1047   BP: 148/80   Pulse: 80   Resp: 12   Temp: 97 6 °F (36 4 °C)          Physical Exam  Constitutional:       Appearance: She is well-developed  HENT:      Head: Normocephalic and atraumatic  Nose: Nose normal    Eyes:      General: No scleral icterus  Conjunctiva/sclera: Conjunctivae normal       Pupils: Pupils are equal, round, and reactive to light  Neck:      Musculoskeletal: Normal range of motion and neck supple  Thyroid: No thyromegaly  Vascular: No JVD  Trachea: No tracheal deviation  Cardiovascular:      Rate and Rhythm: Normal rate and regular rhythm  Heart sounds: No murmur  No friction rub  No gallop  Pulmonary:      Effort: Pulmonary effort is normal  No respiratory distress  Breath sounds: Normal breath sounds  No wheezing or rales  Abdominal:      General: Bowel sounds are normal  There is no distension  Palpations: Abdomen is soft  There is no mass  Tenderness: There is no abdominal tenderness  There is no guarding or rebound  Musculoskeletal:         General: No tenderness or deformity  Lymphadenopathy:      Cervical: No cervical adenopathy  Skin:     General: Skin is warm and dry  Coloration: Skin is not pale  Findings: No erythema or rash  Neurological:      Mental Status: She is alert and oriented to person, place, and time  Cranial Nerves: No cranial nerve deficit  Psychiatric:         Behavior: Behavior normal          Thought Content:  Thought content normal          Judgment: Judgment normal

## 2020-09-10 NOTE — PATIENT INSTRUCTIONS
Lab data reviewed in detail and compared prior    Hyperlipidemia -10 year atherosclerotic risk greater than 13  Implications discussed  We discussed initiating statin verses watchful waiting versus coronary calcium scoring and have opted for checking coronary calcium score with CT  If not 0 will plan to initiate statin once we have rectified the left hip issue and weakness  Left hip pain with moderate arthritis and intermittent weakness-referral back to physical therapy    Hypertension-home blood pressures remained stable  Slightly elevated in office today  Monitor home blood pressures at least once per week and contact me if running greater than 140/90  Health maintenance-we discussed getting Shingrix as well as flu shot mid October, otherwise up-to-date    Routine lab follow-up in 6 months  Will follow coronary calcium scoring by phone

## 2020-09-21 ENCOUNTER — HOSPITAL ENCOUNTER (OUTPATIENT)
Dept: CT IMAGING | Facility: CLINIC | Age: 70
Discharge: HOME | End: 2020-09-21
Payer: COMMERCIAL

## 2020-09-21 DIAGNOSIS — I10 ESSENTIAL HYPERTENSION: ICD-10-CM

## 2020-09-21 DIAGNOSIS — E78.2 MIXED HYPERLIPIDEMIA: ICD-10-CM

## 2020-09-21 PROCEDURE — G1004 CDSM NDSC: HCPCS

## 2020-09-22 ENCOUNTER — EVALUATION (OUTPATIENT)
Dept: PHYSICAL THERAPY | Facility: CLINIC | Age: 70
End: 2020-09-22
Payer: COMMERCIAL

## 2020-09-22 DIAGNOSIS — M25.552 LEFT HIP PAIN: Primary | ICD-10-CM

## 2020-09-22 PROCEDURE — 97161 PT EVAL LOW COMPLEX 20 MIN: CPT | Performed by: PHYSICAL THERAPIST

## 2020-09-22 PROCEDURE — 97110 THERAPEUTIC EXERCISES: CPT | Performed by: PHYSICAL THERAPIST

## 2020-09-22 NOTE — PROGRESS NOTES
PT Evaluation     Today's date: 2020  Patient name: Bruno Stewart  : [birth year]  MRN: [id number]  Referring provider: Jesenia Chery MD  Dx:   Encounter Diagnosis     ICD-10-CM    1  Left hip pain  M25 552 Ambulatory referral to Physical Therapy                  Assessment  Assessment details: Patient was provided a home exercise program and demonstrated an understanding of exercises  Patient was advised to stop performing home exercise program if symptoms increase or new complaints developed  Verbal understanding demonstrated regarding home exercise program instructions  Patient would benefit from skilled physical therapy services for prescribed exercises, manual interventions, neuromuscular re-education, education, and modalities as deemed appropriate to assist patient in achieving their maximum level of function  Patient presents with c/o left anterior hip pain as well as intermittent bilateral LE excessive fatigue  Evaluation reveals:  L > R hip weakness,  L hip ROM loss, abnormal gait pattern,  Reported loss of both functional and recreational abilities  Plan will be to resume PT 1-2x/week (due to higher co-pay) with planned referral to Orthopedics if no gains are noted within 2-3 weeks  Impairments: abnormal gait, abnormal muscle firing, abnormal or restricted ROM, abnormal movement, activity intolerance, impaired physical strength, lacks appropriate home exercise program and pain with function  Understanding of Dx/Px/POC: good  Goals  STG   1  Patient will demonstrate independence and competence with HEP 2 -4 weeks  2  Patient will report > 25-50% reduced pain 2-4 weeks    LTG   1  Patient will report improvements with both functional and recreational abilities  4-6 weeks  2  Patient will demonstrate improved motor function  4-6 weeks  3   Improved left hip AROM > 25% all planes  4-6 weeks  4    Improved gait pattern 6-8 weeks      Plan  Plan details: Patient response to treatment will be monitored each session and progressed accordingly    Thank you for this referral    Patient would benefit from: skilled physical therapy  Planned modality interventions: cryotherapy and thermotherapy: hydrocollator packs  Planned therapy interventions: IADL retraining, joint mobilization, manual therapy, patient education, postural training, strengthening, stretching, therapeutic exercise, flexibility, home exercise program, neuromuscular re-education, balance and gait training  Frequency: 1x week  Duration in weeks: 8  Treatment plan discussed with: patient        Subjective Evaluation    History of Present Illness  Mechanism of injury: Patient was seen earlier this year for IE and 1 additional treatment  She was issued a HEP at that time and reports that she was able to lower her pain level to a tolerable limit  Of more concern is a reoccurring weakness that she feels in both hip  She notes that she can do her HEP easily for the most part, but then she will wake some days and feel like she has no strength in her lower legs   This change in sensation may last a day or 2 until she can build her strength back up  She denies back pain  She denies p/n nor numbness     Today- she presents with a describes "discomfort" left hip  1-2/10  If she stands for too long > 30 min or sits > 20 min, her pain will ramp up to 4/10  Pain  Quality: sharp  Relieving factors: change in position  Aggravating factors: standing and walking  Progression: no change    Social Support  Lives in: multiple-level home  Lives with: spouse    Treatments  Previous treatment: physical therapy  Patient Goals  Patient goal: "I want to get some answers"        Objective     Observations     Additional Observation Details  Patient is a small framed women      GAIT - slowed, right pelvis tends to stay in more neutral position during ambulation whereas left has excessive rotation   She demonstrates shortened stride length, decreased stance time left - diminished arm swing bilaterally  POSTURE - patient with slight fhp, flattened thoraco-lumbar spine , loss of lumbar lordosis  Tenderness     Left Hip   No tenderness in the ASIS, PSIS, greater trochanter and iliac crest      Lumbar Screen  Lumbar range of motion within normal limits with the following exceptions:Flexion - able to put palms on floor  Extension - min-mod loss     Neurological Testing     Sensation     Hip   Left Hip   Intact: light touch    Right Hip   Intact: light touch    Active Range of Motion   Left Hip   Flexion: 100 degrees   Abduction: 20 degrees   External rotation (90/90): 20 degrees   Internal rotation (90/90): 10 degrees     Right Hip   Flexion: WFL  Abduction: [facility]  External [facility]90/90[facility]  Internal rotation (90/90): WFL    Passive Range of Motion   Left Hip   Flexion: 110 degrees with pain  Abduction: 28 degrees with pain  External rotation (90/90): 35 degrees with pain  Internal rotation (90/90): 20 degrees with pain    Joint Play   Left Hip   Hypomobile in the posterior hip capsule, anterior hip capsule and lateral hip capsule  Strength/Myotome Testing     Left Hip   Planes of Motion   Flexion: 4-  Extension: 4  Abduction: 4  Adduction: 4  External rotation: 4-  Internal rotation: 4-    Isolated Muscles   Gluteus casimiro: 4-    Right Hip   Planes of Motion   Flexion: 4+  Extension: 4  Abduction: 4+  Adduction: 4+  External rotation: 4+  Internal rotation: 4+    Isolated Muscles   Gluteus maximums: 4+    Tests     Left Hip   Positive JOSEP, FADIR and scour  Negative SI compression and SI distraction  Magnus: Negative  SLR: Negative       Additional Tests Details  Supine - sit leg length (-)      Flowsheet Rows      Most Recent Value   PT/OT G-Codes   Current Score  57   Projected Score  68             Precautions: left hip pain , bilateral LE weakness      Manuals 9/22            FOTO db                         MWM Left hip flexion PROM Left hip all planes             Hip flexor stretch in  Right s/l                                                     Neuro Re-Ed             GS, add sets                                                                                           Ther Ex             bike             Butterfly stretch 20s x 3            Seated ktc 10s x 10            Seated glut stretch 20s x 3            Seated piriformis stretch 20s x 3                                      Prone hip ext             Prone hip ext w/ knee flexed             HL TB BKFO                                                    Ther Activity                                       Gait Training                                       Modalities

## 2020-09-23 ENCOUNTER — TELEPHONE (OUTPATIENT)
Dept: INTERNAL MEDICINE CLINIC | Facility: CLINIC | Age: 70
End: 2020-09-23

## 2020-09-23 DIAGNOSIS — E78.2 MIXED HYPERLIPIDEMIA: Primary | ICD-10-CM

## 2020-09-23 RX ORDER — ROSUVASTATIN CALCIUM 10 MG/1
10 TABLET, COATED ORAL DAILY
Qty: 30 TABLET | Refills: 5 | Status: SHIPPED | OUTPATIENT
Start: 2020-09-23 | End: 2021-03-19

## 2020-09-23 NOTE — TELEPHONE ENCOUNTER
----- Message from Corinne Grams, MD sent at 9/23/2020  6:14 PM EDT -----  Notify-coronary calcium score 61 in the left anterior descending and diagonal coronary artery  I would like to start rosuvastatin 10 mg once daily as we discussed  I will send to her pharmacy  Have her contact me with any questions, concerns, side effects

## 2020-09-29 ENCOUNTER — OFFICE VISIT (OUTPATIENT)
Dept: PHYSICAL THERAPY | Facility: CLINIC | Age: 70
End: 2020-09-29
Payer: COMMERCIAL

## 2020-09-29 DIAGNOSIS — M25.552 LEFT HIP PAIN: Primary | ICD-10-CM

## 2020-09-29 PROCEDURE — 97110 THERAPEUTIC EXERCISES: CPT

## 2020-09-29 PROCEDURE — 97140 MANUAL THERAPY 1/> REGIONS: CPT

## 2020-09-29 PROCEDURE — 97112 NEUROMUSCULAR REEDUCATION: CPT

## 2020-09-29 NOTE — PROGRESS NOTES
Daily Note     Today's date: 2020  Patient name: Naida Carpio  : [birth year]  MRN: [id number]  Referring provider: Doroteo Lua MD  Dx:   Encounter Diagnosis     ICD-10-CM    1  Left hip pain  M25 552        Start Time:           Subjective: Upon presentation, SPR =3/10  Objective: See treatment diary below      Assessment: Tolerated treatment and progression of program without limitations in exercise performance or exacerbaiton of hip symptoms; everything performed to tolerance  Patient exhibited good technique with therapeutic exercises and would benefit from continued PT      Plan: Continue per plan of care  Progress treatment as tolerated         Precautions: left hip pain , bilateral LE weakness      Manuals            FOTO db                         MWM Left hip flexion              PROM Left hip all planes  LA  PTA           Hip flexor stretch in  Right s/l   LA  PTA                                                  Neuro Re-Ed             GS, add sets  :03  20x each                                                                                         Ther Ex             Recumbent bike  10'           Butterfly stretch 20s x 3 :20  3x           Seated ktc 10s x 10 :10  10x           Seated glut stretch 20s x 3 :03  20x           Seated piriformis stretch 20s x 3 :20  3x                                     Prone hip ext  10x2  each           Prone hip ext w/ knee flexed  10x2  each           HL TB BKFO  RTB  20x                                                   Ther Activity                                       Gait Training                                       Modalities

## 2020-10-06 ENCOUNTER — OFFICE VISIT (OUTPATIENT)
Dept: PHYSICAL THERAPY | Facility: CLINIC | Age: 70
End: 2020-10-06
Payer: COMMERCIAL

## 2020-10-06 DIAGNOSIS — M25.552 LEFT HIP PAIN: Primary | ICD-10-CM

## 2020-10-06 PROCEDURE — 97140 MANUAL THERAPY 1/> REGIONS: CPT

## 2020-10-06 PROCEDURE — 97112 NEUROMUSCULAR REEDUCATION: CPT

## 2020-10-06 PROCEDURE — 97110 THERAPEUTIC EXERCISES: CPT

## 2020-10-07 ENCOUNTER — HOSPITAL ENCOUNTER (OUTPATIENT)
Dept: MAMMOGRAPHY | Facility: CLINIC | Age: 70
Discharge: HOME | End: 2020-10-07
Payer: COMMERCIAL

## 2020-10-07 VITALS — HEIGHT: 62 IN | BODY MASS INDEX: 21.53 KG/M2 | WEIGHT: 117 LBS

## 2020-10-07 DIAGNOSIS — Z85.3 PERSONAL HISTORY OF BREAST CANCER: ICD-10-CM

## 2020-10-07 DIAGNOSIS — Z12.39 SCREENING FOR BREAST CANCER: ICD-10-CM

## 2020-10-07 PROCEDURE — 77067 SCR MAMMO BI INCL CAD: CPT

## 2020-10-07 PROCEDURE — 77063 BREAST TOMOSYNTHESIS BI: CPT

## 2020-10-13 ENCOUNTER — OFFICE VISIT (OUTPATIENT)
Dept: PHYSICAL THERAPY | Facility: CLINIC | Age: 70
End: 2020-10-13
Payer: COMMERCIAL

## 2020-10-13 DIAGNOSIS — M25.552 LEFT HIP PAIN: Primary | ICD-10-CM

## 2020-10-13 PROCEDURE — 97140 MANUAL THERAPY 1/> REGIONS: CPT | Performed by: PHYSICAL THERAPIST

## 2020-10-13 PROCEDURE — 97110 THERAPEUTIC EXERCISES: CPT | Performed by: PHYSICAL THERAPIST

## 2020-10-20 ENCOUNTER — OFFICE VISIT (OUTPATIENT)
Dept: PHYSICAL THERAPY | Facility: CLINIC | Age: 70
End: 2020-10-20
Payer: COMMERCIAL

## 2020-10-20 DIAGNOSIS — M25.552 LEFT HIP PAIN: Primary | ICD-10-CM

## 2020-10-20 PROCEDURE — 97110 THERAPEUTIC EXERCISES: CPT

## 2020-10-20 PROCEDURE — 97140 MANUAL THERAPY 1/> REGIONS: CPT

## 2020-10-27 ENCOUNTER — OFFICE VISIT (OUTPATIENT)
Dept: PHYSICAL THERAPY | Facility: CLINIC | Age: 70
End: 2020-10-27
Payer: COMMERCIAL

## 2020-10-27 DIAGNOSIS — M25.552 LEFT HIP PAIN: Primary | ICD-10-CM

## 2020-10-27 PROCEDURE — 97140 MANUAL THERAPY 1/> REGIONS: CPT | Performed by: PHYSICAL THERAPIST

## 2020-10-27 PROCEDURE — 97112 NEUROMUSCULAR REEDUCATION: CPT | Performed by: PHYSICAL THERAPIST

## 2020-10-27 PROCEDURE — 97110 THERAPEUTIC EXERCISES: CPT | Performed by: PHYSICAL THERAPIST

## 2020-11-04 ENCOUNTER — OFFICE VISIT (OUTPATIENT)
Dept: PHYSICAL THERAPY | Facility: CLINIC | Age: 70
End: 2020-11-04
Payer: COMMERCIAL

## 2020-11-04 DIAGNOSIS — M25.552 LEFT HIP PAIN: Primary | ICD-10-CM

## 2020-11-04 PROCEDURE — 97140 MANUAL THERAPY 1/> REGIONS: CPT | Performed by: PHYSICAL THERAPIST

## 2020-11-04 PROCEDURE — 97110 THERAPEUTIC EXERCISES: CPT | Performed by: PHYSICAL THERAPIST

## 2020-11-04 PROCEDURE — 97112 NEUROMUSCULAR REEDUCATION: CPT | Performed by: PHYSICAL THERAPIST

## 2020-11-20 ENCOUNTER — APPOINTMENT (OUTPATIENT)
Dept: RADIOLOGY | Facility: MEDICAL CENTER | Age: 70
End: 2020-11-20
Payer: COMMERCIAL

## 2020-11-20 ENCOUNTER — OFFICE VISIT (OUTPATIENT)
Dept: URGENT CARE | Facility: MEDICAL CENTER | Age: 70
End: 2020-11-20
Payer: COMMERCIAL

## 2020-11-20 ENCOUNTER — TELEPHONE (OUTPATIENT)
Dept: URGENT CARE | Facility: MEDICAL CENTER | Age: 70
End: 2020-11-20

## 2020-11-20 VITALS
BODY MASS INDEX: 21.16 KG/M2 | RESPIRATION RATE: 18 BRPM | HEIGHT: 62 IN | TEMPERATURE: 97.8 F | SYSTOLIC BLOOD PRESSURE: 142 MMHG | HEART RATE: 80 BPM | WEIGHT: 115 LBS | DIASTOLIC BLOOD PRESSURE: 75 MMHG | OXYGEN SATURATION: 97 %

## 2020-11-20 DIAGNOSIS — S69.91XA INJURY OF RIGHT THUMB, INITIAL ENCOUNTER: ICD-10-CM

## 2020-11-20 DIAGNOSIS — S60.111A CONTUSION OF RIGHT THUMB WITH DAMAGE TO NAIL, INITIAL ENCOUNTER: Primary | ICD-10-CM

## 2020-11-20 PROCEDURE — S9088 SERVICES PROVIDED IN URGENT: HCPCS | Performed by: PHYSICIAN ASSISTANT

## 2020-11-20 PROCEDURE — 99213 OFFICE O/P EST LOW 20 MIN: CPT | Performed by: PHYSICIAN ASSISTANT

## 2020-11-20 PROCEDURE — 73140 X-RAY EXAM OF FINGER(S): CPT

## 2020-11-20 PROCEDURE — 29130 APPL FINGER SPLINT STATIC: CPT | Performed by: PHYSICIAN ASSISTANT

## 2020-11-20 RX ORDER — CEPHALEXIN 500 MG/1
500 CAPSULE ORAL EVERY 6 HOURS SCHEDULED
Qty: 28 CAPSULE | Refills: 0 | Status: SHIPPED | OUTPATIENT
Start: 2020-11-20 | End: 2020-11-27

## 2020-12-07 DIAGNOSIS — M25.552 LEFT HIP PAIN: Primary | ICD-10-CM

## 2021-01-12 ENCOUNTER — CONSULT (OUTPATIENT)
Dept: OBGYN CLINIC | Facility: HOSPITAL | Age: 71
End: 2021-01-12
Payer: COMMERCIAL

## 2021-01-12 VITALS
DIASTOLIC BLOOD PRESSURE: 83 MMHG | SYSTOLIC BLOOD PRESSURE: 178 MMHG | BODY MASS INDEX: 21.9 KG/M2 | HEIGHT: 62 IN | HEART RATE: 80 BPM | WEIGHT: 119 LBS

## 2021-01-12 DIAGNOSIS — M25.552 LEFT HIP PAIN: ICD-10-CM

## 2021-01-12 DIAGNOSIS — M16.12 ARTHRITIS OF LEFT HIP: Primary | ICD-10-CM

## 2021-01-12 PROCEDURE — 3008F BODY MASS INDEX DOCD: CPT | Performed by: ORTHOPAEDIC SURGERY

## 2021-01-12 PROCEDURE — 3079F DIAST BP 80-89 MM HG: CPT | Performed by: ORTHOPAEDIC SURGERY

## 2021-01-12 PROCEDURE — 1036F TOBACCO NON-USER: CPT | Performed by: ORTHOPAEDIC SURGERY

## 2021-01-12 PROCEDURE — 3077F SYST BP >= 140 MM HG: CPT | Performed by: ORTHOPAEDIC SURGERY

## 2021-01-12 PROCEDURE — 99203 OFFICE O/P NEW LOW 30 MIN: CPT | Performed by: ORTHOPAEDIC SURGERY

## 2021-01-13 NOTE — NURSING NOTE
Call placed to patient to discuss upcoming appointment at [facility] radiology department and complete consultation with patient  Patient is having left hip joint injection under fluoroscopy  Discussed with patient her allergies NKDA , no current anticoagulant medication  present  , also discussed the pre and post procedure expectations  Reminded patient of location and time expected for procedure, Patient expressed understanding by verbalizing and repeating instructions  My number was also supplied to patient to call if any further questions or concerns should arise pre or post procedure

## 2021-01-19 ENCOUNTER — HOSPITAL ENCOUNTER (OUTPATIENT)
Dept: RADIOLOGY | Facility: HOSPITAL | Age: 71
Discharge: HOME | End: 2021-01-19
Attending: ORTHOPAEDIC SURGERY
Payer: COMMERCIAL

## 2021-01-19 DIAGNOSIS — M16.12 ARTHRITIS OF LEFT HIP: ICD-10-CM

## 2021-01-19 DIAGNOSIS — M25.552 LEFT HIP PAIN: ICD-10-CM

## 2021-01-19 PROCEDURE — 77002 NEEDLE LOCALIZATION BY XRAY: CPT

## 2021-01-19 PROCEDURE — 20610 DRAIN/INJ JOINT/BURSA W/O US: CPT

## 2021-01-19 RX ORDER — METHYLPREDNISOLONE ACETATE 80 MG/ML
80 INJECTION, SUSPENSION INTRA-ARTICULAR; INTRALESIONAL; INTRAMUSCULAR; SOFT TISSUE
Status: DISCONTINUED | OUTPATIENT
Start: 2021-01-19 | End: 2021-01-20 | Stop reason: HOSPADM

## 2021-01-19 RX ORDER — BUPIVACAINE HYDROCHLORIDE 2.5 MG/ML
30 INJECTION, SOLUTION EPIDURAL; INFILTRATION; INTRACAUDAL
Status: DISCONTINUED | OUTPATIENT
Start: 2021-01-19 | End: 2021-01-20 | Stop reason: HOSPADM

## 2021-01-19 RX ORDER — LIDOCAINE HYDROCHLORIDE 10 MG/ML
10 INJECTION, SOLUTION EPIDURAL; INFILTRATION; INTRACAUDAL; PERINEURAL
Status: DISCONTINUED | OUTPATIENT
Start: 2021-01-19 | End: 2021-01-20 | Stop reason: HOSPADM

## 2021-01-19 RX ADMIN — IOHEXOL 3 ML: 300 INJECTION, SOLUTION INTRAVENOUS at 12:35

## 2021-02-26 ENCOUNTER — OFFICE VISIT (OUTPATIENT)
Dept: OBGYN CLINIC | Facility: MEDICAL CENTER | Age: 71
End: 2021-02-26
Payer: COMMERCIAL

## 2021-02-26 VITALS
BODY MASS INDEX: 21.9 KG/M2 | HEIGHT: 62 IN | WEIGHT: 119 LBS | HEART RATE: 83 BPM | DIASTOLIC BLOOD PRESSURE: 90 MMHG | SYSTOLIC BLOOD PRESSURE: 170 MMHG

## 2021-02-26 DIAGNOSIS — M25.552 LEFT HIP PAIN: Primary | ICD-10-CM

## 2021-02-26 DIAGNOSIS — M16.12 ARTHRITIS OF LEFT HIP: ICD-10-CM

## 2021-02-26 PROCEDURE — 3080F DIAST BP >= 90 MM HG: CPT | Performed by: ORTHOPAEDIC SURGERY

## 2021-02-26 PROCEDURE — 3077F SYST BP >= 140 MM HG: CPT | Performed by: ORTHOPAEDIC SURGERY

## 2021-02-26 PROCEDURE — 99213 OFFICE O/P EST LOW 20 MIN: CPT | Performed by: ORTHOPAEDIC SURGERY

## 2021-02-26 NOTE — PROGRESS NOTES
Assessment:  1  Left hip pain     2  Arthritis of left hip         Plan:  The patient is a candidate for left total hip arthroplasty  Risks and expectations were discussed with the patient in detail  All questions were answered  The patient will call if/when she would blue to repeat a left IA hip steroid injection  The patient can follow up as needed and is welcome to return at any point with any new or old issue  To do next visit:  Return if symptoms worsen or fail to improve  The above stated was discussed in layman's terms and the patient expressed understanding  All questions were answered to the patient's satisfaction  Scribe Attestation    I,:  Carrie Velasquez am acting as a scribe while in the presence of the attending physician :       I,:  Marc Cardoza MD personally performed the services described in this documentation    as scribed in my presence :             Subjective:   Kelley Martel is a 69 y o  female who presents for follow up of left hip  She is s/p left IA hip steroid injection with 3 weeks of good benefit, 1/19/2021  Today she complains of left anterior groin pain with occasional left anterior thigh pain  She describes the pain as stabbing  She rates her pain at 0/10 and 5/10 at tis worse  She does use Tylenol 3x/day with benefit          Review of systems negative unless otherwise specified in HPI    Past Medical History:   Diagnosis Date    Breast cancer ([facility] [address] ) 03/01/1993    Hematuria     LAST ASSESSED [id number]       Past Surgical History:   Procedure Laterality Date    BREAST LUMPECTOMY Right 04/01/1993    FL INJECTION LEFT HIP (NON ARTHROGRAM)  1/19/2021    LYMPH NODE BIOPSY      MOHS SURGERY         Family History   Problem Relation Age of Onset    Breast cancer Mother 79    No Known Problems Sister     No Known Problems Daughter     No Known Problems Sister     No Known Problems Sister     No Known Problems Sister     No Known Problems Daughter Social History     Occupational History    Not on file   Tobacco Use    Smoking status: Never Smoker    Smokeless tobacco: Never Used   Substance and Sexual Activity    Alcohol use: Yes     Frequency: 4 or more times a week     Drinks per session: 1 or 2     Binge frequency: Never    Drug use: No    Sexual activity: Not Currently         Current Outpatient Medications:     Calcium 500 MG tablet, Take by mouth, Disp: , Rfl:     Multiple Vitamin (MULTI-VITAMIN DAILY) TABS, Take by mouth, Disp: , Rfl:     Progesterone Micronized 10 % CREA, Place on the skin daily , Disp: , Rfl:     rosuvastatin (CRESTOR) 10 MG tablet, Take 1 tablet (10 mg total) by mouth daily, Disp: 30 tablet, Rfl: 5    No Known Allergies         Vitals:    02/26/21 1316   BP: 170/90   Pulse: 83       Objective:  Physical exam  · General: Awake, Alert, Oriented  · Eyes: Pupils equal, round and reactive to light  · Heart: regular rate and rhythm  · Lungs: No audible wheezing  · Abdomen: soft                    Ortho Exam   Left hip:  Apprehension with ROM  Groin pain with IR  ER with flexion  Calf compartments soft and supple  Sensation intact  Toes are warm sensate and mobile      Diagnostics, reviewed and taken today if performed as documented:    None performed     Procedures, if performed today:    Procedures    None performed      Portions of the record may have been created with voice recognition software  Occasional wrong word or "sound a like" substitutions may have occurred due to the inherent limitations of voice recognition software  Read the chart carefully and recognize, using context, where substitutions have occurred

## 2021-03-01 ENCOUNTER — LAB (OUTPATIENT)
Dept: LAB | Facility: CLINIC | Age: 71
End: 2021-03-01
Payer: COMMERCIAL

## 2021-03-01 DIAGNOSIS — I10 ESSENTIAL HYPERTENSION: ICD-10-CM

## 2021-03-01 DIAGNOSIS — E78.2 MIXED HYPERLIPIDEMIA: ICD-10-CM

## 2021-03-01 LAB
ALBUMIN SERPL BCP-MCNC: 4.1 G/DL (ref 3.5–5)
ALP SERPL-CCNC: 77 U/L (ref 46–116)
ALT SERPL W P-5'-P-CCNC: 24 U/L (ref 12–78)
ANION GAP SERPL CALCULATED.3IONS-SCNC: 2 MMOL/L (ref 4–13)
AST SERPL W P-5'-P-CCNC: 22 U/L (ref 5–45)
BASOPHILS # BLD AUTO: 0.05 THOUSANDS/ΜL (ref 0–0.1)
BASOPHILS NFR BLD AUTO: 1 % (ref 0–1)
BILIRUB SERPL-MCNC: 0.39 MG/DL (ref 0.2–1)
BUN SERPL-MCNC: 13 MG/DL (ref 5–25)
CALCIUM SERPL-MCNC: 9.6 MG/DL (ref 8.3–10.1)
CHLORIDE SERPL-SCNC: 107 MMOL/L (ref 100–108)
CHOLEST SERPL-MCNC: 196 MG/DL (ref 50–200)
CO2 SERPL-SCNC: 31 MMOL/L (ref 21–32)
CREAT SERPL-MCNC: 0.77 MG/DL (ref 0.6–1.3)
EOSINOPHIL # BLD AUTO: 0.18 THOUSAND/ΜL (ref 0–0.61)
EOSINOPHIL NFR BLD AUTO: 3 % (ref 0–6)
ERYTHROCYTE [DISTWIDTH] IN BLOOD BY AUTOMATED COUNT: 13.7 % (ref 11.6–15.1)
GFR SERPL CREATININE-BSD FRML MDRD: 78 ML/MIN/1.73SQ M
GLUCOSE P FAST SERPL-MCNC: 94 MG/DL (ref 65–99)
HCT VFR BLD AUTO: 41.6 % (ref 34.8–46.1)
HDLC SERPL-MCNC: 77 MG/DL
HGB BLD-MCNC: 13 G/DL (ref 11.5–15.4)
IMM GRANULOCYTES # BLD AUTO: 0.01 THOUSAND/UL (ref 0–0.2)
IMM GRANULOCYTES NFR BLD AUTO: 0 % (ref 0–2)
LDLC SERPL CALC-MCNC: 101 MG/DL (ref 0–100)
LYMPHOCYTES # BLD AUTO: 2.02 THOUSANDS/ΜL (ref 0.6–4.47)
LYMPHOCYTES NFR BLD AUTO: 37 % (ref 14–44)
MCH RBC QN AUTO: 28.1 PG (ref 26.8–34.3)
MCHC RBC AUTO-ENTMCNC: 31.3 G/DL (ref 31.4–37.4)
MCV RBC AUTO: 90 FL (ref 82–98)
MONOCYTES # BLD AUTO: 0.54 THOUSAND/ΜL (ref 0.17–1.22)
MONOCYTES NFR BLD AUTO: 10 % (ref 4–12)
NEUTROPHILS # BLD AUTO: 2.6 THOUSANDS/ΜL (ref 1.85–7.62)
NEUTS SEG NFR BLD AUTO: 49 % (ref 43–75)
NONHDLC SERPL-MCNC: 119 MG/DL
NRBC BLD AUTO-RTO: 0 /100 WBCS
PLATELET # BLD AUTO: 246 THOUSANDS/UL (ref 149–390)
PMV BLD AUTO: 10.4 FL (ref 8.9–12.7)
POTASSIUM SERPL-SCNC: 3.7 MMOL/L (ref 3.5–5.3)
PROT SERPL-MCNC: 7.7 G/DL (ref 6.4–8.2)
RBC # BLD AUTO: 4.62 MILLION/UL (ref 3.81–5.12)
SODIUM SERPL-SCNC: 140 MMOL/L (ref 136–145)
TRIGL SERPL-MCNC: 90 MG/DL
WBC # BLD AUTO: 5.4 THOUSAND/UL (ref 4.31–10.16)

## 2021-03-01 PROCEDURE — 36415 COLL VENOUS BLD VENIPUNCTURE: CPT

## 2021-03-01 PROCEDURE — 85025 COMPLETE CBC W/AUTO DIFF WBC: CPT

## 2021-03-01 PROCEDURE — 80053 COMPREHEN METABOLIC PANEL: CPT

## 2021-03-01 PROCEDURE — 80061 LIPID PANEL: CPT

## 2021-03-05 DIAGNOSIS — Z23 ENCOUNTER FOR IMMUNIZATION: ICD-10-CM

## 2021-03-10 ENCOUNTER — HOSPITAL ENCOUNTER (OUTPATIENT)
Dept: NON INVASIVE DIAGNOSTICS | Facility: CLINIC | Age: 71
Discharge: HOME | End: 2021-03-10
Payer: COMMERCIAL

## 2021-03-10 ENCOUNTER — OFFICE VISIT (OUTPATIENT)
Dept: INTERNAL MEDICINE CLINIC | Facility: CLINIC | Age: 71
End: 2021-03-10
Payer: COMMERCIAL

## 2021-03-10 ENCOUNTER — IMMUNIZATIONS (OUTPATIENT)
Dept: FAMILY MEDICINE CLINIC | Facility: HOSPITAL | Age: 71
End: 2021-03-10
Payer: COMMERCIAL

## 2021-03-10 VITALS
BODY MASS INDEX: 22.56 KG/M2 | RESPIRATION RATE: 12 BRPM | TEMPERATURE: 98.1 F | HEIGHT: 62 IN | SYSTOLIC BLOOD PRESSURE: 148 MMHG | WEIGHT: 122.6 LBS | DIASTOLIC BLOOD PRESSURE: 82 MMHG | HEART RATE: 72 BPM

## 2021-03-10 DIAGNOSIS — I10 ESSENTIAL HYPERTENSION: Primary | ICD-10-CM

## 2021-03-10 DIAGNOSIS — E78.2 MIXED HYPERLIPIDEMIA: ICD-10-CM

## 2021-03-10 DIAGNOSIS — Z23 ENCOUNTER FOR IMMUNIZATION: Primary | ICD-10-CM

## 2021-03-10 DIAGNOSIS — R01.1 MURMUR: ICD-10-CM

## 2021-03-10 PROCEDURE — 91300 SARS-COV-2 / COVID-19 MRNA VACCINE (PFIZER-BIONTECH) 30 MCG: CPT

## 2021-03-10 PROCEDURE — 1101F PT FALLS ASSESS-DOCD LE1/YR: CPT | Performed by: INTERNAL MEDICINE

## 2021-03-10 PROCEDURE — 3725F SCREEN DEPRESSION PERFORMED: CPT | Performed by: INTERNAL MEDICINE

## 2021-03-10 PROCEDURE — 93306 TTE W/DOPPLER COMPLETE: CPT

## 2021-03-10 PROCEDURE — 3008F BODY MASS INDEX DOCD: CPT | Performed by: INTERNAL MEDICINE

## 2021-03-10 PROCEDURE — 1036F TOBACCO NON-USER: CPT | Performed by: INTERNAL MEDICINE

## 2021-03-10 PROCEDURE — 93000 ELECTROCARDIOGRAM COMPLETE: CPT | Performed by: INTERNAL MEDICINE

## 2021-03-10 PROCEDURE — 1160F RVW MEDS BY RX/DR IN RCRD: CPT | Performed by: INTERNAL MEDICINE

## 2021-03-10 PROCEDURE — 3288F FALL RISK ASSESSMENT DOCD: CPT | Performed by: INTERNAL MEDICINE

## 2021-03-10 PROCEDURE — 99214 OFFICE O/P EST MOD 30 MIN: CPT | Performed by: INTERNAL MEDICINE

## 2021-03-10 PROCEDURE — 0001A SARS-COV-2 / COVID-19 MRNA VACCINE (PFIZER-BIONTECH) 30 MCG: CPT

## 2021-03-10 PROCEDURE — 93306 TTE W/DOPPLER COMPLETE: CPT | Performed by: INTERNAL MEDICINE

## 2021-03-10 RX ORDER — VALSARTAN 80 MG/1
80 TABLET ORAL DAILY
Qty: 30 TABLET | Refills: 5 | Status: SHIPPED | OUTPATIENT
Start: 2021-03-10 | End: 2021-07-02

## 2021-03-10 RX ORDER — SENNOSIDES 8.6 MG
650 CAPSULE ORAL EVERY 8 HOURS PRN
COMMUNITY
End: 2021-09-17

## 2021-03-10 NOTE — PATIENT INSTRUCTIONS
Lab data reviewed in detail and compared prior     Hyperlipidemia markedly improved on rosuvastatin     Hypertension -likely related to recent decrease in activity level related to hip  EKG reveals normal sinus rhythm with ST depressions in V5 V6, septal Q's with 0 5 mm ST elevation in V1 V2, cannot rule out septal MI verses lead placement  Will check stat echo  Hold off on antihypertensives pending echo report  Monitor home blood pressures and follow-up later this week      Murmur-check echo as above    Osteoarthritis right hip -planning to schedule total hip arthroplasty    Health maintenance is up-to-date

## 2021-03-10 NOTE — PROGRESS NOTES
Assessment/Plan:    Diagnoses and all orders for this visit:    Essential hypertension  -     CBC and differential; Future  -     Comprehensive metabolic panel; Future  -     TSH, 3rd generation; Future  -     POCT ECG    Mixed hyperlipidemia  -     Lipid panel; Future    Other orders  -     acetaminophen (Acetaminophen 8 Hour) 650 mg CR tablet; Take 650 mg by mouth every 8 (eight) hours as needed for mild pain              Patient Instructions    Lab data reviewed in detail and compared prior     Hyperlipidemia markedly improved on rosuvastatin     Hypertension -likely related to recent decrease in activity level related to hip  Osteoarthritis right hip -planning to schedule total hip arthroplasty    Health maintenance is up-to-date      Subjective:      Patient ID: Karen Langley is a 69 y o  female    F/u mmp and review labs   Feeling generally well  Getting first covid vaccine today  L hip pain continues to be an issue  Ready for surgery, but road tripping to [place] next mo for birth of grandchild  Sitting is ok  HTN-Home bp's have all been 110-130/60-70's  HPL-tolerating crestor, exercise limited to PT for hip lately            Current Outpatient Medications:     acetaminophen (Acetaminophen 8 Hour) 650 mg CR tablet, Take 650 mg by mouth every 8 (eight) hours as needed for mild pain, Disp: , Rfl:     Calcium 500 MG tablet, Take by mouth, Disp: , Rfl:     Multiple Vitamin (MULTI-VITAMIN DAILY) TABS, Take by mouth, Disp: , Rfl:     Progesterone Micronized 10 % CREA, Place on the skin daily , Disp: , Rfl:     rosuvastatin (CRESTOR) 10 MG tablet, Take 1 tablet (10 mg total) by mouth daily, Disp: 30 tablet, Rfl: 5    Recent Results (from the past 1008 hour(s))   CBC and differential    Collection Time: 03/01/21  8:05 AM   Result Value Ref Range    WBC 5 40 4 31 - 10 16 Thousand/uL    RBC 4 62 3 81 - 5 12 Million/uL    Hemoglobin 13 0 11 5 - 15 4 g/dL    Hematocrit 41 6 34 8 - 46 1 %    MCV 90 82 - 98 fL MCH 28 1 26 8 - 34 3 pg    MCHC 31 3 (L) 31 4 - 37 4 g/dL    RDW 13 7 11 6 - 15 1 %    MPV 10 4 8 9 - 12 7 fL    Platelets 096 707 - 594 Thousands/uL    nRBC 0 /100 WBCs    Neutrophils Relative 49 43 - 75 %    Immat GRANS % 0 0 - 2 %    Lymphocytes Relative 37 14 - 44 %    Monocytes Relative 10 4 - 12 %    Eosinophils Relative 3 0 - 6 %    Basophils Relative 1 0 - 1 %    Neutrophils Absolute 2 60 1 85 - 7 62 Thousands/µL    Immature Grans Absolute 0 01 0 00 - 0 20 Thousand/uL    Lymphocytes Absolute 2 02 0 60 - 4 47 Thousands/µL    Monocytes Absolute 0 54 0 17 - 1 22 Thousand/µL    Eosinophils Absolute 0 18 0 00 - 0 61 Thousand/µL    Basophils Absolute 0 05 0 00 - 0 10 Thousands/µL   Comprehensive metabolic panel    Collection Time: 03/01/21  8:05 AM   Result Value Ref Range    Sodium 140 136 - 145 mmol/L    Potassium 3 7 3 5 - 5 3 mmol/L    Chloride 107 100 - 108 mmol/L    CO2 31 21 - 32 mmol/L    ANION GAP 2 (L) 4 - 13 mmol/L    BUN 13 5 - 25 mg/dL    Creatinine 0 77 0 60 - 1 30 mg/dL    Glucose, Fasting 94 65 - 99 mg/dL    Calcium 9 6 8 3 - 10 1 mg/dL    AST 22 5 - 45 U/L    ALT 24 12 - 78 U/L    Alkaline Phosphatase 77 46 - 116 U/L    Total Protein 7 7 6 4 - 8 2 g/dL    Albumin 4 1 3 5 - 5 0 g/dL    Total Bilirubin 0 39 0 20 - 1 00 mg/dL    eGFR 78 ml/min/1 73sq m   Lipid panel    Collection Time: 03/01/21  8:05 AM   Result Value Ref Range    Cholesterol 196 50 - 200 mg/dL    Triglycerides 90 <=150 mg/dL    HDL, Direct 77 >=40 mg/dL    LDL Calculated 101 (H) 0 - 100 mg/dL    Non-HDL-Chol (CHOL-HDL) 119 mg/dl       The following portions of the patient's history were reviewed and updated as appropriate: allergies, current medications, past family history, past medical history, past social history, past surgical history and problem list      Review of Systems      Objective:      Vitals:    03/10/21 1326   BP: 148/82   Pulse:    Resp:    Temp:           Physical Exam

## 2021-03-18 ENCOUNTER — TELEPHONE (OUTPATIENT)
Dept: OBGYN CLINIC | Facility: MEDICAL CENTER | Age: 71
End: 2021-03-18

## 2021-03-19 DIAGNOSIS — E78.2 MIXED HYPERLIPIDEMIA: ICD-10-CM

## 2021-03-19 RX ORDER — ROSUVASTATIN CALCIUM 10 MG/1
TABLET, COATED ORAL
Qty: 90 TABLET | Refills: 1 | Status: SHIPPED | OUTPATIENT
Start: 2021-03-19 | End: 2021-09-16

## 2021-04-01 ENCOUNTER — IMMUNIZATIONS (OUTPATIENT)
Dept: FAMILY MEDICINE CLINIC | Facility: HOSPITAL | Age: 71
End: 2021-04-01

## 2021-04-01 DIAGNOSIS — Z23 ENCOUNTER FOR IMMUNIZATION: Primary | ICD-10-CM

## 2021-04-01 PROCEDURE — 0002A SARS-COV-2 / COVID-19 MRNA VACCINE (PFIZER-BIONTECH) 30 MCG: CPT

## 2021-04-01 PROCEDURE — 91300 SARS-COV-2 / COVID-19 MRNA VACCINE (PFIZER-BIONTECH) 30 MCG: CPT

## 2021-04-16 ENCOUNTER — OFFICE VISIT (OUTPATIENT)
Dept: OBGYN CLINIC | Facility: MEDICAL CENTER | Age: 71
End: 2021-04-16
Payer: COMMERCIAL

## 2021-04-16 VITALS
WEIGHT: 122 LBS | SYSTOLIC BLOOD PRESSURE: 165 MMHG | RESPIRATION RATE: 18 BRPM | HEIGHT: 62 IN | BODY MASS INDEX: 22.45 KG/M2 | HEART RATE: 87 BPM | DIASTOLIC BLOOD PRESSURE: 91 MMHG

## 2021-04-16 DIAGNOSIS — M25.552 LEFT HIP PAIN: Primary | ICD-10-CM

## 2021-04-16 DIAGNOSIS — M16.12 ARTHRITIS OF LEFT HIP: ICD-10-CM

## 2021-04-16 DIAGNOSIS — M25.552 LEFT HIP PAIN: ICD-10-CM

## 2021-04-16 PROCEDURE — 99213 OFFICE O/P EST LOW 20 MIN: CPT | Performed by: ORTHOPAEDIC SURGERY

## 2021-04-16 PROCEDURE — 3077F SYST BP >= 140 MM HG: CPT | Performed by: ORTHOPAEDIC SURGERY

## 2021-04-16 PROCEDURE — 3008F BODY MASS INDEX DOCD: CPT | Performed by: ORTHOPAEDIC SURGERY

## 2021-04-16 PROCEDURE — 1036F TOBACCO NON-USER: CPT | Performed by: ORTHOPAEDIC SURGERY

## 2021-04-16 PROCEDURE — 1160F RVW MEDS BY RX/DR IN RCRD: CPT | Performed by: ORTHOPAEDIC SURGERY

## 2021-04-16 PROCEDURE — 3080F DIAST BP >= 90 MM HG: CPT | Performed by: ORTHOPAEDIC SURGERY

## 2021-04-16 RX ORDER — ASCORBIC ACID 500 MG
500 TABLET ORAL DAILY
Qty: 30 TABLET | Refills: 0 | Status: SHIPPED | OUTPATIENT
Start: 2021-04-16 | End: 2021-06-04 | Stop reason: HOSPADM

## 2021-04-16 RX ORDER — CEFAZOLIN SODIUM 2 G/50ML
2000 SOLUTION INTRAVENOUS ONCE
Status: CANCELLED | OUTPATIENT
Start: 2021-04-16 | End: 2021-04-16

## 2021-04-16 RX ORDER — ACETAMINOPHEN 325 MG/1
975 TABLET ORAL ONCE
Status: CANCELLED | OUTPATIENT
Start: 2021-04-16 | End: 2021-04-16

## 2021-04-16 RX ORDER — FERROUS SULFATE TAB EC 324 MG (65 MG FE EQUIVALENT) 324 (65 FE) MG
324 TABLET DELAYED RESPONSE ORAL
Qty: 60 TABLET | Refills: 0 | Status: SHIPPED | OUTPATIENT
Start: 2021-04-16 | End: 2021-06-04 | Stop reason: HOSPADM

## 2021-04-16 RX ORDER — GABAPENTIN 300 MG/1
300 CAPSULE ORAL ONCE
Status: CANCELLED | OUTPATIENT
Start: 2021-04-16 | End: 2021-04-16

## 2021-04-16 RX ORDER — FOLIC ACID 1 MG/1
1 TABLET ORAL DAILY
Qty: 30 TABLET | Refills: 0 | Status: SHIPPED | OUTPATIENT
Start: 2021-04-16 | End: 2021-05-10

## 2021-04-16 RX ORDER — CHLORHEXIDINE GLUCONATE 0.12 MG/ML
15 RINSE ORAL ONCE
Status: CANCELLED | OUTPATIENT
Start: 2021-04-16 | End: 2021-04-16

## 2021-04-16 RX ORDER — SODIUM CHLORIDE, SODIUM LACTATE, POTASSIUM CHLORIDE, CALCIUM CHLORIDE 600; 310; 30; 20 MG/100ML; MG/100ML; MG/100ML; MG/100ML
125 INJECTION, SOLUTION INTRAVENOUS CONTINUOUS
Status: CANCELLED | OUTPATIENT
Start: 2021-04-16

## 2021-04-16 NOTE — PROGRESS NOTES
Assessment:  1  Left hip pain     2  Arthritis of left hip         Plan:  The patient will be scheduled for left total hip arthroplasty  We feel the patient will find significant relief per current symptoms, findings on imaging and exam   Risks, benefits, precautions and expectations were discussed  Risks include blood loss, potential infection and blood clots  Preoperative vitamins were prescribed  The patient should follow up after surgery  To do next visit:  Return for post-op visit   The above stated was discussed in layman's terms and the patient expressed understanding  All questions were answered to the patient's satisfaction  Scribe Attestation    I,:  Althea Thibodeaux am acting as a scribe while in the presence of the attending physician :       I,:  Ryder Becker MD personally performed the services described in this documentation    as scribed in my presence :             Subjective:   Magnus Thomas is a 69 y o  female who presents for follow up of left hip  Today she complains of left groin pain  She rates her pain at 0/10 and 4/10 at its worse  She describes the pain as stabbing  Most activity can randomly aggravate as well as prolonged activity such as steps  She does use Tylneol 3x/day with some relief  She has had left IA hip injections with short-lived benefit          Review of systems negative unless otherwise specified in HPI    Past Medical History:   Diagnosis Date    Breast cancer ([facility] [address] ) 03/01/1993    Hematuria     LAST ASSESSED [id number]       Past Surgical History:   Procedure Laterality Date    BREAST LUMPECTOMY Right 04/01/1993    FL INJECTION LEFT HIP (NON ARTHROGRAM)  1/19/2021    LYMPH NODE BIOPSY      MOHS SURGERY         Family History   Problem Relation Age of Onset    Breast cancer Mother 79    No Known Problems Sister     No Known Problems Daughter     No Known Problems Sister     No Known Problems Sister     No Known Problems Sister     No Known Problems Daughter        Social History     Occupational History    Not on file   Tobacco Use    Smoking status: Never Smoker    Smokeless tobacco: Never Used   Substance and Sexual Activity    Alcohol use: Yes     Frequency: 4 or more times a week     Drinks per session: 1 or 2     Binge frequency: Never    Drug use: No    Sexual activity: Not Currently         Current Outpatient Medications:     acetaminophen (Acetaminophen 8 Hour) 650 mg CR tablet, Take 650 mg by mouth every 8 (eight) hours as needed for mild pain, Disp: , Rfl:     Calcium 500 MG tablet, Take by mouth, Disp: , Rfl:     Multiple Vitamin (MULTI-VITAMIN DAILY) TABS, Take by mouth, Disp: , Rfl:     Progesterone Micronized 10 % CREA, Place on the skin daily , Disp: , Rfl:     rosuvastatin (CRESTOR) 10 MG tablet, TAKE 1 TABLET BY MOUTH EVERY DAY, Disp: 90 tablet, Rfl: 1    valsartan (DIOVAN) 80 mg tablet, Take 1 tablet (80 mg total) by mouth daily, Disp: 30 tablet, Rfl: 5    No Known Allergies         Vitals:    04/16/21 1312   BP: 165/91   Pulse: 87   Resp: 18       Objective:  Physical exam  · General: Awake, Alert, Oriented  · Eyes: Pupils equal, round and reactive to light  · Heart: regular rate and rhythm  · Lungs: No audible wheezing  · Abdomen: soft                    Ortho Exam  Left hip:  Apprehension with ROM  Groin pain with IR  ER with flexion  Calf compartments soft and supple  Sensation intact  Toes are warm sensate and mobile      Diagnostics, reviewed and taken today if performed as documented:    None performed     Procedures, if performed today:    Procedures    None performed      Portions of the record may have been created with voice recognition software  Occasional wrong word or "sound a like" substitutions may have occurred due to the inherent limitations of voice recognition software  Read the chart carefully and recognize, using context, where substitutions have occurred

## 2021-04-16 NOTE — TELEPHONE ENCOUNTER
[facility] pharmacy calling to confirm how many days patient will need to take the Lovenox injections  Advised 28 days post surgery  Verbalized understanding

## 2021-04-19 ENCOUNTER — PREP FOR PROCEDURE (OUTPATIENT)
Dept: OBGYN CLINIC | Facility: HOSPITAL | Age: 71
End: 2021-04-19

## 2021-04-19 DIAGNOSIS — Z01.812 PRE-OPERATIVE LABORATORY EXAMINATION: Primary | ICD-10-CM

## 2021-04-19 DIAGNOSIS — M25.552 LEFT HIP PAIN: ICD-10-CM

## 2021-04-19 DIAGNOSIS — M16.12 ARTHRITIS OF LEFT HIP: ICD-10-CM

## 2021-05-07 ENCOUNTER — APPOINTMENT (OUTPATIENT)
Dept: PHYSICAL THERAPY | Facility: CLINIC | Age: 71
End: 2021-05-07
Payer: COMMERCIAL

## 2021-05-09 DIAGNOSIS — M25.552 LEFT HIP PAIN: ICD-10-CM

## 2021-05-09 DIAGNOSIS — M16.12 ARTHRITIS OF LEFT HIP: ICD-10-CM

## 2021-05-10 RX ORDER — FOLIC ACID 1 MG/1
TABLET ORAL
Qty: 30 TABLET | Refills: 0 | Status: SHIPPED | OUTPATIENT
Start: 2021-05-10 | End: 2021-06-04 | Stop reason: HOSPADM

## 2021-05-14 ENCOUNTER — TELEPHONE (OUTPATIENT)
Dept: OBGYN CLINIC | Facility: HOSPITAL | Age: 71
End: 2021-05-14

## 2021-05-17 ENCOUNTER — TRANSCRIBE ORDERS (OUTPATIENT)
Dept: LAB | Facility: CLINIC | Age: 71
End: 2021-05-17

## 2021-05-17 ENCOUNTER — LAB REQUISITION (OUTPATIENT)
Dept: LAB | Facility: HOSPITAL | Age: 71
End: 2021-05-17
Payer: COMMERCIAL

## 2021-05-17 ENCOUNTER — APPOINTMENT (OUTPATIENT)
Dept: LAB | Facility: CLINIC | Age: 71
End: 2021-05-17
Payer: COMMERCIAL

## 2021-05-17 DIAGNOSIS — M16.12 ARTHRITIS OF LEFT HIP: Primary | ICD-10-CM

## 2021-05-17 DIAGNOSIS — M16.12 ARTHRITIS OF LEFT HIP: ICD-10-CM

## 2021-05-17 DIAGNOSIS — M16.12 UNILATERAL PRIMARY OSTEOARTHRITIS, LEFT HIP: ICD-10-CM

## 2021-05-17 LAB
ABO GROUP BLD: NORMAL
ALBUMIN SERPL BCP-MCNC: 4 G/DL (ref 3.5–5)
ALP SERPL-CCNC: 65 U/L (ref 46–116)
ALT SERPL W P-5'-P-CCNC: 22 U/L (ref 12–78)
ANION GAP SERPL CALCULATED.3IONS-SCNC: 4 MMOL/L (ref 4–13)
APTT PPP: 30 SECONDS (ref 23–37)
AST SERPL W P-5'-P-CCNC: 25 U/L (ref 5–45)
BASOPHILS # BLD AUTO: 0.02 THOUSANDS/ΜL (ref 0–0.1)
BASOPHILS NFR BLD AUTO: 0 % (ref 0–1)
BILIRUB SERPL-MCNC: 0.43 MG/DL (ref 0.2–1)
BLD GP AB SCN SERPL QL: NEGATIVE
BUN SERPL-MCNC: 13 MG/DL (ref 5–25)
CALCIUM SERPL-MCNC: 9.3 MG/DL (ref 8.3–10.1)
CHLORIDE SERPL-SCNC: 108 MMOL/L (ref 100–108)
CO2 SERPL-SCNC: 29 MMOL/L (ref 21–32)
CREAT SERPL-MCNC: 0.66 MG/DL (ref 0.6–1.3)
CRP SERPL QL: <3 MG/L
EOSINOPHIL # BLD AUTO: 0.19 THOUSAND/ΜL (ref 0–0.61)
EOSINOPHIL NFR BLD AUTO: 4 % (ref 0–6)
ERYTHROCYTE [DISTWIDTH] IN BLOOD BY AUTOMATED COUNT: 14.6 % (ref 11.6–15.1)
EST. AVERAGE GLUCOSE BLD GHB EST-MCNC: 108 MG/DL
GFR SERPL CREATININE-BSD FRML MDRD: 89 ML/MIN/1.73SQ M
GLUCOSE P FAST SERPL-MCNC: 90 MG/DL (ref 65–99)
HBA1C MFR BLD: 5.4 %
HCT VFR BLD AUTO: 41.8 % (ref 34.8–46.1)
HGB BLD-MCNC: 13.2 G/DL (ref 11.5–15.4)
IMM GRANULOCYTES # BLD AUTO: 0.01 THOUSAND/UL (ref 0–0.2)
IMM GRANULOCYTES NFR BLD AUTO: 0 % (ref 0–2)
INR PPP: 0.9 (ref 0.84–1.19)
LYMPHOCYTES # BLD AUTO: 1.91 THOUSANDS/ΜL (ref 0.6–4.47)
LYMPHOCYTES NFR BLD AUTO: 39 % (ref 14–44)
MCH RBC QN AUTO: 29 PG (ref 26.8–34.3)
MCHC RBC AUTO-ENTMCNC: 31.6 G/DL (ref 31.4–37.4)
MCV RBC AUTO: 92 FL (ref 82–98)
MONOCYTES # BLD AUTO: 0.42 THOUSAND/ΜL (ref 0.17–1.22)
MONOCYTES NFR BLD AUTO: 9 % (ref 4–12)
NEUTROPHILS # BLD AUTO: 2.41 THOUSANDS/ΜL (ref 1.85–7.62)
NEUTS SEG NFR BLD AUTO: 48 % (ref 43–75)
NRBC BLD AUTO-RTO: 0 /100 WBCS
PLATELET # BLD AUTO: 212 THOUSANDS/UL (ref 149–390)
PMV BLD AUTO: 10.3 FL (ref 8.9–12.7)
POTASSIUM SERPL-SCNC: 4 MMOL/L (ref 3.5–5.3)
PROT SERPL-MCNC: 7.5 G/DL (ref 6.4–8.2)
PROTHROMBIN TIME: 12.2 SECONDS (ref 11.6–14.5)
RBC # BLD AUTO: 4.55 MILLION/UL (ref 3.81–5.12)
RH BLD: POSITIVE
SODIUM SERPL-SCNC: 141 MMOL/L (ref 136–145)
SPECIMEN EXPIRATION DATE: NORMAL
WBC # BLD AUTO: 4.96 THOUSAND/UL (ref 4.31–10.16)

## 2021-05-17 PROCEDURE — 85025 COMPLETE CBC W/AUTO DIFF WBC: CPT

## 2021-05-17 PROCEDURE — 86850 RBC ANTIBODY SCREEN: CPT | Performed by: ORTHOPAEDIC SURGERY

## 2021-05-17 PROCEDURE — 86140 C-REACTIVE PROTEIN: CPT

## 2021-05-17 PROCEDURE — 83036 HEMOGLOBIN GLYCOSYLATED A1C: CPT

## 2021-05-17 PROCEDURE — 85730 THROMBOPLASTIN TIME PARTIAL: CPT

## 2021-05-17 PROCEDURE — 36415 COLL VENOUS BLD VENIPUNCTURE: CPT

## 2021-05-17 PROCEDURE — 86900 BLOOD TYPING SEROLOGIC ABO: CPT | Performed by: ORTHOPAEDIC SURGERY

## 2021-05-17 PROCEDURE — 80053 COMPREHEN METABOLIC PANEL: CPT

## 2021-05-17 PROCEDURE — 86901 BLOOD TYPING SEROLOGIC RH(D): CPT | Performed by: ORTHOPAEDIC SURGERY

## 2021-05-17 PROCEDURE — 85610 PROTHROMBIN TIME: CPT

## 2021-05-18 ENCOUNTER — ANESTHESIA EVENT (OUTPATIENT)
Dept: PERIOP | Facility: HOSPITAL | Age: 71
End: 2021-05-18
Payer: COMMERCIAL

## 2021-05-18 NOTE — PRE-PROCEDURE INSTRUCTIONS
Pre-Surgery Instructions:   Medication Instructions    acetaminophen (Acetaminophen 8 Hour) 650 mg CR tablet Pt takes PRN    ascorbic acid (VITAMIN C) 500 MG tablet Pt not taking DOS     Calcium 500 MG tablet LD 5/26 will resume after DOS     enoxaparin (LOVENOX) 40 mg/0 4 mL this is for AFTER surgery for home use    ferrous sulfate 324 (65 Fe) mg Pt not taking DOS     folic acid (FOLVITE) 1 mg tablet Pt not taking DOS     Multiple Vitamin (MULTI-VITAMIN DAILY) TABS Pt not taking DOS     Progesterone Micronized 10 % CREA Pt takes at HS LD 6/1    rosuvastatin (CRESTOR) 10 MG tablet Pt takes at HS     valsartan (DIOVAN) 80 mg tablet not taking the DOS     ACE/ARB Med Class  Continue this medication up to the evening before surgery/procedure, but do not take the morning of the day of surgery  Acetaminophen Med Class  Continue to take this medication on your normal schedule  If this is an oral medication and you take it in the morning, then you may take this medicine with a sip of water  Low Molecular Weight Heparin Med Class  Stop taking this medication at least 12-24 hours prior to surgery/procedure with prescribing Physician and Surgeon consultation  Statin Med Class  Continue to take this medication on your normal schedule  If this is an oral medication and you take it in the morning, then you may take this medicine with a sip of water  Vitamin Med Class  You may continue to take any vitamin that your surgeon has prescribed to you up to the day before surgery  If your surgeon has not specifically prescribed this vitamin or instructed you to continue then stop taking 7 days prior to surgery  Reviewed with Pt med insts, showering insts, Ortho class and all of it's components  Advised of [facility] call 6/2 with final DOS details  NPO at [facility] for DOS including candy mints etc   Contact number given if questions arose Pt verbalized understanding to all of the above

## 2021-05-20 ENCOUNTER — RA CDI HCC (OUTPATIENT)
Dept: OTHER | Facility: HOSPITAL | Age: 71
End: 2021-05-20

## 2021-05-20 NOTE — PROGRESS NOTES
[facility]  coding opportunities          Chart reviewed, no opportunity found: CHART REVIEWED, NO OPPORTUNITY FOUND              Patients insurance company: [employer]

## 2021-05-24 ENCOUNTER — TELEPHONE (OUTPATIENT)
Dept: OBGYN CLINIC | Facility: HOSPITAL | Age: 71
End: 2021-05-24

## 2021-05-24 ENCOUNTER — CONSULT (OUTPATIENT)
Dept: INTERNAL MEDICINE CLINIC | Facility: CLINIC | Age: 71
End: 2021-05-24
Payer: COMMERCIAL

## 2021-05-24 VITALS
OXYGEN SATURATION: 98 % | HEIGHT: 62 IN | HEART RATE: 85 BPM | WEIGHT: 122 LBS | TEMPERATURE: 97.9 F | DIASTOLIC BLOOD PRESSURE: 78 MMHG | SYSTOLIC BLOOD PRESSURE: 132 MMHG | BODY MASS INDEX: 22.45 KG/M2

## 2021-05-24 DIAGNOSIS — Z01.818 PREOP EXAM FOR INTERNAL MEDICINE: Primary | ICD-10-CM

## 2021-05-24 DIAGNOSIS — D69.6 THROMBOCYTOPENIA (HCC): ICD-10-CM

## 2021-05-24 DIAGNOSIS — I10 ESSENTIAL HYPERTENSION: ICD-10-CM

## 2021-05-24 DIAGNOSIS — M16.12 ARTHRITIS OF LEFT HIP: ICD-10-CM

## 2021-05-24 DIAGNOSIS — E78.2 MIXED HYPERLIPIDEMIA: ICD-10-CM

## 2021-05-24 PROBLEM — M25.552 LEFT HIP PAIN: Status: RESOLVED | Noted: 2021-01-12 | Resolved: 2021-05-24

## 2021-05-24 PROCEDURE — 1160F RVW MEDS BY RX/DR IN RCRD: CPT | Performed by: INTERNAL MEDICINE

## 2021-05-24 PROCEDURE — 99214 OFFICE O/P EST MOD 30 MIN: CPT | Performed by: INTERNAL MEDICINE

## 2021-05-24 PROCEDURE — 1036F TOBACCO NON-USER: CPT | Performed by: INTERNAL MEDICINE

## 2021-05-24 NOTE — PATIENT INSTRUCTIONS
Lab data from May 17th reviewed and unremarkable  EKG today shows normal sinus rhythm, cannot rule out anteroseptal MI with Q-waves V1 and V2 unchanged from prior March 10th 2021  Echocardiogram from March 10, 2021 reviewed and notable for normal EF 60% with no regional wall motion abnormalities and grade 1 diastolic dysfunction with mild mitral regurgitation and mild aortic sclerosis  I see no medical contraindication to proceeding with planned total hip arthroplasty  I see no indication for further preoperative cardiac testing  Patient advised to hold valsartan 1 day prior to surgery and morning of surgery, this can be resumed postoperatively when blood pressure stable  She was advised to stop calcium prior to surgery  She has been prescribed Lovenox for postoperative DVT prophylaxis  She should continue rosuvastatin throughout the perioperative period

## 2021-05-24 NOTE — PROGRESS NOTES
Assessment/Plan:    Diagnoses and all orders for this visit:    Preop exam for internal medicine    Essential hypertension    Arthritis of left hip    Mixed hyperlipidemia    Thrombocytopenia [facility])              Patient Instructions     Lab data from May 17th reviewed and unremarkable  EKG today shows normal sinus rhythm, cannot rule out anteroseptal MI with Q-waves V1 and V2 unchanged from prior March 10th 2021  Echocardiogram from March 10, 2021 reviewed and notable for normal EF 60% with no regional wall motion abnormalities and grade 1 diastolic dysfunction with mild mitral regurgitation and mild aortic sclerosis  I see no medical contraindication to proceeding with planned total hip arthroplasty  I see no indication for further preoperative cardiac testing  Patient advised to hold valsartan 1 day prior to surgery and morning of surgery, this can be resumed postoperatively when blood pressure stable  She was advised to stop calcium prior to surgery  She has been prescribed Lovenox for postoperative DVT prophylaxis  She should continue rosuvastatin throughout the perioperative period  Subjective:      Patient ID: Yas Calderón is a 69 y o  female    F/u for preop L MAGDALENA  Feeling generally well  Completed covid vaccines  HTN-No recent home bp's  Tolerating Valsartan  HPL-tolerating crestor, exercise limited to PT for hip lately            Current Outpatient Medications:     acetaminophen (Acetaminophen 8 Hour) 650 mg CR tablet, Take 650 mg by mouth every 8 (eight) hours as needed for mild pain, Disp: , Rfl:     ascorbic acid (VITAMIN C) 500 MG tablet, Take 1 tablet (500 mg total) by mouth daily Start 30 days prior to surgery, Disp: 30 tablet, Rfl: 0    Calcium 500 MG tablet, Take by mouth, Disp: , Rfl:     ferrous sulfate 324 (65 Fe) mg, Take 1 tablet (324 mg total) by mouth 2 (two) times a day before meals Start 30 days prior to surgery, Disp: 60 tablet, Rfl: 0    folic acid (FOLVITE) 1 mg tablet, TAKE 1 TABLET (1 MG TOTAL) BY MOUTH DAILY START 30 DAYS PRIOR TO SURGERY (NOT COVERED), Disp: 30 tablet, Rfl: 0    Multiple Vitamin (MULTI-VITAMIN DAILY) TABS, Take by mouth, Disp: , Rfl:     Progesterone Micronized 10 % CREA, Place on the skin daily , Disp: , Rfl:     rosuvastatin (CRESTOR) 10 MG tablet, TAKE 1 TABLET BY MOUTH EVERY DAY, Disp: 90 tablet, Rfl: 1    valsartan (DIOVAN) 80 mg tablet, Take 1 tablet (80 mg total) by mouth daily, Disp: 30 tablet, Rfl: 5    enoxaparin (LOVENOX) 40 mg/0 4 mL, INJECT 0 4 ML (40 MG TOTAL) UNDER THE SKIN DAILY (Patient not taking: Reported on 5/24/2021), Disp: 11 2 Syringe, Rfl: 0    Recent Results (from the past 1008 hour(s))   Type and screen    Collection Time: 05/17/21  7:52 AM   Result Value Ref Range    ABO Grouping A     Rh Factor Positive     Antibody Screen Negative     Specimen Expiration Date [id number]    Comprehensive metabolic panel    Collection Time: 05/17/21  7:58 AM   Result Value Ref Range    Sodium 141 136 - 145 mmol/L    Potassium 4 0 3 5 - 5 3 mmol/L    Chloride 108 100 - 108 mmol/L    CO2 29 21 - 32 mmol/L    ANION GAP 4 4 - 13 mmol/L    BUN 13 5 - 25 mg/dL    Creatinine 0 66 0 60 - 1 30 mg/dL    Glucose, Fasting 90 65 - 99 mg/dL    Calcium 9 3 8 3 - 10 1 mg/dL    AST 25 5 - 45 U/L    ALT 22 12 - 78 U/L    Alkaline Phosphatase 65 46 - 116 U/L    Total Protein 7 5 6 4 - 8 2 g/dL    Albumin 4 0 3 5 - 5 0 g/dL    Total Bilirubin 0 43 0 20 - 1 00 mg/dL    eGFR 89 ml/min/1 73sq m   CBC and differential    Collection Time: 05/17/21  7:58 AM   Result Value Ref Range    WBC 4 96 4 31 - 10 16 Thousand/uL    RBC 4 55 3 81 - 5 12 Million/uL    Hemoglobin 13 2 11 5 - 15 4 g/dL    Hematocrit 41 8 34 8 - 46 1 %    MCV 92 82 - 98 fL    MCH 29 0 26 8 - 34 3 pg    MCHC 31 6 31 4 - 37 4 g/dL    RDW 14 6 11 6 - 15 1 %    MPV 10 3 8 9 - 12 7 fL    Platelets 528 211 - 421 Thousands/uL    nRBC 0 /100 WBCs    Neutrophils Relative 48 43 - 75 %    Immat GRANS % 0 0 - 2 %    Lymphocytes Relative 39 14 - 44 %    Monocytes Relative 9 4 - 12 %    Eosinophils Relative 4 0 - 6 %    Basophils Relative 0 0 - 1 %    Neutrophils Absolute 2 41 1 85 - 7 62 Thousands/µL    Immature Grans Absolute 0 01 0 00 - 0 20 Thousand/uL    Lymphocytes Absolute 1 91 0 60 - 4 47 Thousands/µL    Monocytes Absolute 0 42 0 17 - 1 22 Thousand/µL    Eosinophils Absolute 0 19 0 00 - 0 61 Thousand/µL    Basophils Absolute 0 02 0 00 - 0 10 Thousands/µL   C-reactive protein    Collection Time: 05/17/21  7:58 AM   Result Value Ref Range    CRP <3 0 <3 0 mg/L   Protime-INR    Collection Time: 05/17/21  7:58 AM   Result Value Ref Range    Protime 12 2 11 6 - 14 5 seconds    INR 0 90 0 84 - 1 19   APTT    Collection Time: 05/17/21  7:58 AM   Result Value Ref Range    PTT 30 23 - 37 seconds   Hemoglobin A1C    Collection Time: 05/17/21  7:58 AM   Result Value Ref Range    Hemoglobin A1C 5 4 Normal 3 8-5 6%; PreDiabetic 5 7-6 4%; Diabetic >=6 5%; Glycemic control for adults with diabetes <7 0% %     mg/dl       The following portions of the patient's history were reviewed and updated as appropriate: allergies, current medications, past family history, past medical history, past social history, past surgical history and problem list      Review of Systems   Constitutional: Negative for appetite change, chills, diaphoresis, fatigue, fever and unexpected weight change  HENT: Negative for congestion, hearing loss and rhinorrhea  Eyes: Negative for visual disturbance  Respiratory: Negative for cough, chest tightness, shortness of breath and wheezing  Cardiovascular: Negative for chest pain, palpitations and leg swelling  Gastrointestinal: Negative for abdominal pain and blood in stool  Endocrine: Negative for cold intolerance, heat intolerance, polydipsia and polyuria  Genitourinary: Negative for difficulty urinating, dysuria, frequency and urgency     Musculoskeletal: Positive for arthralgias and gait problem  Negative for myalgias  Skin: Negative for rash  Neurological: Negative for dizziness, weakness, light-headedness and headaches  Hematological: Does not bruise/bleed easily  Psychiatric/Behavioral: Negative for dysphoric mood and sleep disturbance  Objective:      Vitals:    05/24/21 1735   BP: 132/78   Pulse:    Temp:    SpO2:           Physical Exam  Constitutional:       Appearance: She is well-developed  HENT:      Head: Normocephalic and atraumatic  Nose: Nose normal    Eyes:      General: No scleral icterus  Conjunctiva/sclera: Conjunctivae normal       Pupils: Pupils are equal, round, and reactive to light  Neck:      Musculoskeletal: Normal range of motion and neck supple  Thyroid: No thyromegaly  Vascular: No JVD  Trachea: No tracheal deviation  Cardiovascular:      Rate and Rhythm: Normal rate and regular rhythm  Heart sounds: Murmur present  No friction rub  No gallop  Pulmonary:      Effort: Pulmonary effort is normal  No respiratory distress  Breath sounds: Normal breath sounds  No wheezing or rales  Musculoskeletal:         General: No deformity  Lymphadenopathy:      Cervical: No cervical adenopathy  Skin:     General: Skin is warm and dry  Coloration: Skin is not pale  Findings: No erythema or rash  Neurological:      Mental Status: She is alert and oriented to person, place, and time  Cranial Nerves: No cranial nerve deficit  Psychiatric:         Behavior: Behavior normal          Thought Content:  Thought content normal          Judgment: Judgment normal

## 2021-05-24 NOTE — TELEPHONE ENCOUNTER
Patient contacted for a  Preoperative Elective Admission Assessment  A detailed VM was left requesting a call back

## 2021-05-25 ENCOUNTER — TELEPHONE (OUTPATIENT)
Dept: INTERNAL MEDICINE CLINIC | Facility: CLINIC | Age: 71
End: 2021-05-25

## 2021-05-25 NOTE — TELEPHONE ENCOUNTER
[facility] Pre Admission Testing    She rec'ed the clearance note and it sd there was an ekg done    she needs this faxed to [phone]

## 2021-05-27 ENCOUNTER — EVALUATION (OUTPATIENT)
Dept: PHYSICAL THERAPY | Facility: CLINIC | Age: 71
End: 2021-05-27
Payer: COMMERCIAL

## 2021-05-27 DIAGNOSIS — M16.12 ARTHRITIS OF LEFT HIP: Primary | ICD-10-CM

## 2021-05-27 PROCEDURE — 97110 THERAPEUTIC EXERCISES: CPT | Performed by: PHYSICAL THERAPIST

## 2021-05-27 PROCEDURE — 97161 PT EVAL LOW COMPLEX 20 MIN: CPT | Performed by: PHYSICAL THERAPIST

## 2021-05-27 NOTE — PROGRESS NOTES
PT Evaluation     Today's date: 2021  Patient name: Roselyn Morris  : [birth year]  MRN: [id number]  Referring provider: Magan Zaldivar MD  Dx:   Encounter Diagnosis     ICD-10-CM    1  Arthritis of left hip  M16 12 Ambulatory referral to Physical Therapy                  Assessment  Assessment details: Patient was provided a home exercise program and demonstrated an understanding of exercises  Patient was advised to stop performing home exercise program if symptoms increase or new complaints developed  Verbal understanding demonstrated regarding home exercise program instructions  Patient would benefit from skilled physical therapy services for prescribed exercises, manual interventions, neuromuscular re-education, education, and modalities as deemed appropriate to assist patient in achieving their maximum level of function  Patient presents Pre-operative for MAGDALENA to be performed 6-3-2021  She was instructed in HEP to be initiated now as well as was issued home check-list for post operative management    Evaluation reveals:  Painful left hip, antalgic gait, weakness, loss of mobility and overall loss of function     She is highly motivated and presents in good standing  Impairments: abnormal gait, abnormal or restricted ROM, abnormal movement, activity intolerance, impaired physical strength, lacks appropriate home exercise program and pain with function  Understanding of Dx/Px/POC: good  Goals  STG   1  Patient will demonstrate independence and competence with HEP 2 -4 weeks   HEP written issued today        Plan  Plan details: Patient response to treatment will be monitored each session and progressed accordingly    Thank you for this referral    Patient would benefit from: skilled physical therapy  Planned modality interventions: cryotherapy  Planned therapy interventions: IADL retraining, manual therapy, patient education, postural training, strengthening, stretching, therapeutic exercise, flexibility, home exercise program, neuromuscular re-education, balance and gait training  Frequency: 2x week  Duration in weeks: 8  Treatment plan discussed with: patient        Subjective Evaluation    History of Present Illness  Mechanism of injury: Patient is scheduled for Left MAGDALENA 6-3-2021 with Dr Lanis Opitz  Pain  Current pain ratin  At worst pain ratin  Quality: dull ache  Aggravating factors: stair climbing, standing and walking  Progression: worsening    Social Support  Lives in: multiple-level home    Patient Goals  Patient goals for therapy: decreased pain, independence with ADLs/IADLs, return to sport/leisure activities and increased motion  Patient goal: "i want to be able to walk"         Objective     Neurological Testing     Sensation     Hip   Left Hip   Intact: light touch    Right Hip   Intact: light touch    Active Range of Motion   Left Hip   Flexion: 95 degrees   Abduction: 24 degrees   External rotation (90/90): 20 degrees   Internal rotation (90/90): 15 degrees     Right Hip   Normal active range of motion    Strength/Myotome Testing     Left Hip   Planes of Motion   Flexion: 3+  Abduction: 3+  Adduction: 4  External rotation: 4-  Internal rotation: 4-    Right Hip   Normal muscle strength    General Comments:      Hip Comments   GAIT - abnormal, decreased pelvic rotation, decreased stance time left, shortened stride, diminished arm swing bilaterally     TUG - NO ad  12 65 seconds               Precautions: MAGDALENA scheduled for 6-3-2021      Manuals             FOTO                                                    Neuro Re-Ed             gs qs 3s x 20                                                                                          Ther Ex             bike             Heel slides  20x            Supine abd 20x            laq 3s x 20                                                                                                                    Ther Activity Gait Training                                       Modalities

## 2021-06-01 ENCOUNTER — TELEPHONE (OUTPATIENT)
Dept: OBGYN CLINIC | Facility: HOSPITAL | Age: 71
End: 2021-06-01

## 2021-06-01 NOTE — TELEPHONE ENCOUNTER
Preoperative Elective Admission Assessment- Spoke to patient directly  Living Situation:  Pt reports living at home with her , Brad Arias  They reside in a ranch with basement, with a walk in shower with a stool  Steps: 7 with railing  First Floor Setup: Once in the home, all one level, does not need basement  DME: Pt denies having a cane, RW or BSC  Patient's Current Level of Function: independent with ambulation and ADLs    Post-op Caregiver: , Brad Arias    Post-op Transport: , Brad Arias  Outpatient Physical Therapy Site: [facility]    Medication Management:  Pt manages daily medications removing them daily from the bottles  Preferred Pharmacy for Post-op Medications: [facility]                    Blood Management Vitamin Regimen: Pt reports taking folic acid, vitamin C and Iron daily  Post-op anticoagulant: Postoperative lovenox is at home ready for after surgery use  Discharge Plan: Pt educated that our goal, if at all possible, is to appropriately discharge patient based off their post-op function while striving to maintain maximal independence  If possible, the goal is to discharge patient to home and for them to attend outpatient physical therapy  Barriers to DC identified preoperatively:   *None Identified  BMI: 22 31     Cares[employer]: Pt denied enrollment  Patient Education: Pt educated on post op pain, early mobilization (POD0), average LOS, and goals for outpatient PT pt encouraged to call me with questions, concerns or issues

## 2021-06-01 NOTE — TELEPHONE ENCOUNTER
Third attempt to reach patient for preoperative elective admission assessment  VM left again requesting call back  Will send update to surgeon  Will not have preoperative elective admission assessment charted

## 2021-06-02 NOTE — ANESTHESIA PREPROCEDURE EVALUATION
Procedure:  ARTHROPLASTY HIP TOTAL (Left Hip)    Relevant Problems   CARDIO   (+) Hyperlipidemia   (+) Hypertension      /RENAL   (+) Renal cyst      HEMATOLOGY   (+) Thrombocytopenia (HCC)      MUSCULOSKELETAL   (+) Arthritis of left hip      NEURO/PSYCH   (+) Personal history of breast cancer        Physical Exam    Airway    Mallampati score: II  TM Distance: >3 FB  Neck ROM: full     Dental       Cardiovascular      Pulmonary      Other Findings        Anesthesia Plan  ASA Score- 2     Anesthesia Type- spinal with ASA Monitors  Additional Monitors:   Airway Plan:           Plan Factors-    Chart reviewed  Induction- intravenous  Postoperative Plan-     Informed Consent- Anesthetic plan and risks discussed with patient  I personally reviewed this patient with the CRNA  Discussed and agreed on the Anesthesia Plan with the CRNA  Carley Gardiner

## 2021-06-03 ENCOUNTER — HOSPITAL ENCOUNTER (OUTPATIENT)
Facility: HOSPITAL | Age: 71
Setting detail: OUTPATIENT SURGERY
Discharge: HOME | End: 2021-06-04
Attending: ORTHOPAEDIC SURGERY | Admitting: ORTHOPAEDIC SURGERY
Payer: COMMERCIAL

## 2021-06-03 ENCOUNTER — ANESTHESIA (OUTPATIENT)
Dept: PERIOP | Facility: HOSPITAL | Age: 71
End: 2021-06-03
Payer: COMMERCIAL

## 2021-06-03 DIAGNOSIS — I10 ESSENTIAL HYPERTENSION: ICD-10-CM

## 2021-06-03 DIAGNOSIS — E78.2 MIXED HYPERLIPIDEMIA: ICD-10-CM

## 2021-06-03 DIAGNOSIS — Z96.642 STATUS POST TOTAL REPLACEMENT OF LEFT HIP: Primary | ICD-10-CM

## 2021-06-03 LAB
ABO GROUP BLD: NORMAL
GLUCOSE SERPL-MCNC: 93 MG/DL (ref 65–140)
RH BLD: POSITIVE

## 2021-06-03 PROCEDURE — 27130 TOTAL HIP ARTHROPLASTY: CPT | Performed by: ORTHOPAEDIC SURGERY

## 2021-06-03 PROCEDURE — C1776 JOINT DEVICE (IMPLANTABLE): HCPCS | Performed by: ORTHOPAEDIC SURGERY

## 2021-06-03 PROCEDURE — 99219 PR INITIAL OBSERVATION CARE/DAY 50 MINUTES: CPT | Performed by: INTERNAL MEDICINE

## 2021-06-03 PROCEDURE — NC001 PR NO CHARGE: Performed by: ORTHOPAEDIC SURGERY

## 2021-06-03 PROCEDURE — 27130 TOTAL HIP ARTHROPLASTY: CPT | Performed by: PHYSICIAN ASSISTANT

## 2021-06-03 PROCEDURE — 97163 PT EVAL HIGH COMPLEX 45 MIN: CPT

## 2021-06-03 PROCEDURE — 82948 REAGENT STRIP/BLOOD GLUCOSE: CPT

## 2021-06-03 PROCEDURE — C1713 ANCHOR/SCREW BN/BN,TIS/BN: HCPCS | Performed by: ORTHOPAEDIC SURGERY

## 2021-06-03 DEVICE — PINNACLE HIP SOLUTIONS ALTRX POLYETHYLENE ACETABULAR LINER +4 10 DEGREES 32MM ID 50MM OD
Type: IMPLANTABLE DEVICE | Site: HIP | Status: FUNCTIONAL
Brand: PINNACLE ALTRX

## 2021-06-03 DEVICE — PINNACLE CANCELLOUS BONE SCREW 6.5MM X 30MM
Type: IMPLANTABLE DEVICE | Site: HIP | Status: FUNCTIONAL
Brand: PINNACLE

## 2021-06-03 DEVICE — ARTICUL/EZE FEMORAL HEAD DIAMETER 32MM +1 12/14 TAPER
Type: IMPLANTABLE DEVICE | Site: HIP | Status: FUNCTIONAL
Brand: ARTICUL/EZE

## 2021-06-03 DEVICE — CORAIL HIP SYSTEM CEMENTLESS FEMORAL STEM 12/14 AMT 135 DEGREES KS SIZE 12 HA COATED STANDARD NO COLLAR
Type: IMPLANTABLE DEVICE | Site: HIP | Status: FUNCTIONAL
Brand: CORAIL

## 2021-06-03 DEVICE — PINNACLE POROCOAT ACETABULAR SHELL SECTOR II 50MM OD
Type: IMPLANTABLE DEVICE | Site: HIP | Status: FUNCTIONAL
Brand: PINNACLE POROCOAT

## 2021-06-03 RX ORDER — CEFAZOLIN SODIUM 1 G/50ML
1000 SOLUTION INTRAVENOUS EVERY 8 HOURS
Status: COMPLETED | OUTPATIENT
Start: 2021-06-03 | End: 2021-06-04

## 2021-06-03 RX ORDER — SODIUM CHLORIDE, SODIUM LACTATE, POTASSIUM CHLORIDE, CALCIUM CHLORIDE 600; 310; 30; 20 MG/100ML; MG/100ML; MG/100ML; MG/100ML
125 INJECTION, SOLUTION INTRAVENOUS CONTINUOUS
Status: DISCONTINUED | OUTPATIENT
Start: 2021-06-03 | End: 2021-06-03

## 2021-06-03 RX ORDER — FENTANYL CITRATE 50 UG/ML
INJECTION, SOLUTION INTRAMUSCULAR; INTRAVENOUS AS NEEDED
Status: DISCONTINUED | OUTPATIENT
Start: 2021-06-03 | End: 2021-06-03

## 2021-06-03 RX ORDER — ONDANSETRON 2 MG/ML
4 INJECTION INTRAMUSCULAR; INTRAVENOUS ONCE AS NEEDED
Status: DISCONTINUED | OUTPATIENT
Start: 2021-06-03 | End: 2021-06-03 | Stop reason: HOSPADM

## 2021-06-03 RX ORDER — GABAPENTIN 300 MG/1
300 CAPSULE ORAL ONCE
Status: COMPLETED | OUTPATIENT
Start: 2021-06-03 | End: 2021-06-03

## 2021-06-03 RX ORDER — CALCIUM CARBONATE 200(500)MG
1000 TABLET,CHEWABLE ORAL DAILY PRN
Status: DISCONTINUED | OUTPATIENT
Start: 2021-06-03 | End: 2021-06-04 | Stop reason: HOSPADM

## 2021-06-03 RX ORDER — ROSUVASTATIN CALCIUM 10 MG/1
10 TABLET, COATED ORAL
Status: DISCONTINUED | OUTPATIENT
Start: 2021-06-03 | End: 2021-06-04 | Stop reason: HOSPADM

## 2021-06-03 RX ORDER — OXYCODONE HYDROCHLORIDE 5 MG/1
10 TABLET ORAL EVERY 4 HOURS PRN
Status: DISCONTINUED | OUTPATIENT
Start: 2021-06-03 | End: 2021-06-04 | Stop reason: HOSPADM

## 2021-06-03 RX ORDER — DOCUSATE SODIUM 100 MG/1
100 CAPSULE, LIQUID FILLED ORAL 2 TIMES DAILY
Status: DISCONTINUED | OUTPATIENT
Start: 2021-06-03 | End: 2021-06-04 | Stop reason: HOSPADM

## 2021-06-03 RX ORDER — CHLORHEXIDINE GLUCONATE 0.12 MG/ML
15 RINSE ORAL ONCE
Status: COMPLETED | OUTPATIENT
Start: 2021-06-03 | End: 2021-06-03

## 2021-06-03 RX ORDER — ACETAMINOPHEN 325 MG/1
975 TABLET ORAL ONCE
Status: DISCONTINUED | OUTPATIENT
Start: 2021-06-03 | End: 2021-06-03

## 2021-06-03 RX ORDER — HYDROMORPHONE HCL/PF 1 MG/ML
0.5 SYRINGE (ML) INJECTION
Status: DISCONTINUED | OUTPATIENT
Start: 2021-06-03 | End: 2021-06-03 | Stop reason: HOSPADM

## 2021-06-03 RX ORDER — MIDAZOLAM HYDROCHLORIDE 2 MG/2ML
INJECTION, SOLUTION INTRAMUSCULAR; INTRAVENOUS AS NEEDED
Status: DISCONTINUED | OUTPATIENT
Start: 2021-06-03 | End: 2021-06-03

## 2021-06-03 RX ORDER — SODIUM CHLORIDE, SODIUM LACTATE, POTASSIUM CHLORIDE, CALCIUM CHLORIDE 600; 310; 30; 20 MG/100ML; MG/100ML; MG/100ML; MG/100ML
50 INJECTION, SOLUTION INTRAVENOUS CONTINUOUS
Status: DISCONTINUED | OUTPATIENT
Start: 2021-06-03 | End: 2021-06-03

## 2021-06-03 RX ORDER — ACETAMINOPHEN 325 MG/1
650 TABLET ORAL EVERY 6 HOURS PRN
Status: DISCONTINUED | OUTPATIENT
Start: 2021-06-03 | End: 2021-06-04 | Stop reason: HOSPADM

## 2021-06-03 RX ORDER — CEFAZOLIN SODIUM 2 G/50ML
2000 SOLUTION INTRAVENOUS ONCE
Status: COMPLETED | OUTPATIENT
Start: 2021-06-03 | End: 2021-06-03

## 2021-06-03 RX ORDER — SENNOSIDES 8.6 MG
1 TABLET ORAL DAILY
Status: DISCONTINUED | OUTPATIENT
Start: 2021-06-04 | End: 2021-06-04 | Stop reason: HOSPADM

## 2021-06-03 RX ORDER — ONDANSETRON 2 MG/ML
4 INJECTION INTRAMUSCULAR; INTRAVENOUS EVERY 6 HOURS PRN
Status: DISCONTINUED | OUTPATIENT
Start: 2021-06-03 | End: 2021-06-04 | Stop reason: HOSPADM

## 2021-06-03 RX ORDER — OXYCODONE HYDROCHLORIDE 5 MG/1
TABLET ORAL
Qty: 30 TABLET | Refills: 0 | Status: SHIPPED | OUTPATIENT
Start: 2021-06-03 | End: 2021-09-17

## 2021-06-03 RX ORDER — SODIUM CHLORIDE, SODIUM LACTATE, POTASSIUM CHLORIDE, CALCIUM CHLORIDE 600; 310; 30; 20 MG/100ML; MG/100ML; MG/100ML; MG/100ML
125 INJECTION, SOLUTION INTRAVENOUS CONTINUOUS
Status: DISCONTINUED | OUTPATIENT
Start: 2021-06-03 | End: 2021-06-04 | Stop reason: HOSPADM

## 2021-06-03 RX ORDER — PROPOFOL 10 MG/ML
INJECTION, EMULSION INTRAVENOUS AS NEEDED
Status: DISCONTINUED | OUTPATIENT
Start: 2021-06-03 | End: 2021-06-03

## 2021-06-03 RX ORDER — OXYCODONE HYDROCHLORIDE 5 MG/1
5 TABLET ORAL EVERY 4 HOURS PRN
Status: DISCONTINUED | OUTPATIENT
Start: 2021-06-03 | End: 2021-06-04 | Stop reason: HOSPADM

## 2021-06-03 RX ORDER — MAGNESIUM HYDROXIDE 1200 MG/15ML
LIQUID ORAL AS NEEDED
Status: DISCONTINUED | OUTPATIENT
Start: 2021-06-03 | End: 2021-06-03 | Stop reason: HOSPADM

## 2021-06-03 RX ORDER — PROPOFOL 10 MG/ML
INJECTION, EMULSION INTRAVENOUS CONTINUOUS PRN
Status: DISCONTINUED | OUTPATIENT
Start: 2021-06-03 | End: 2021-06-03

## 2021-06-03 RX ORDER — HYDRALAZINE HYDROCHLORIDE 25 MG/1
25 TABLET, FILM COATED ORAL EVERY 8 HOURS PRN
Status: DISCONTINUED | OUTPATIENT
Start: 2021-06-03 | End: 2021-06-04 | Stop reason: HOSPADM

## 2021-06-03 RX ORDER — BUPIVACAINE HYDROCHLORIDE 7.5 MG/ML
INJECTION, SOLUTION INTRASPINAL AS NEEDED
Status: DISCONTINUED | OUTPATIENT
Start: 2021-06-03 | End: 2021-06-03

## 2021-06-03 RX ADMIN — MIDAZOLAM 1 MG: 1 INJECTION INTRAMUSCULAR; INTRAVENOUS at 07:39

## 2021-06-03 RX ADMIN — SODIUM CHLORIDE, SODIUM LACTATE, POTASSIUM CHLORIDE, AND CALCIUM CHLORIDE 1000 ML: .6; .31; .03; .02 INJECTION, SOLUTION INTRAVENOUS at 12:17

## 2021-06-03 RX ADMIN — CHLORHEXIDINE GLUCONATE 15 ML: 1.2 SOLUTION ORAL at 06:07

## 2021-06-03 RX ADMIN — BUPIVACAINE HYDROCHLORIDE IN DEXTROSE 1.6 ML: 7.5 INJECTION, SOLUTION SUBARACHNOID at 07:41

## 2021-06-03 RX ADMIN — TRANEXAMIC ACID 1000 MG: 1 INJECTION, SOLUTION INTRAVENOUS at 07:36

## 2021-06-03 RX ADMIN — SODIUM CHLORIDE, SODIUM LACTATE, POTASSIUM CHLORIDE, AND CALCIUM CHLORIDE: .6; .31; .03; .02 INJECTION, SOLUTION INTRAVENOUS at 07:21

## 2021-06-03 RX ADMIN — ENOXAPARIN SODIUM 40 MG: 40 INJECTION SUBCUTANEOUS at 21:42

## 2021-06-03 RX ADMIN — SODIUM CHLORIDE, SODIUM LACTATE, POTASSIUM CHLORIDE, AND CALCIUM CHLORIDE 125 ML/HR: .6; .31; .03; .02 INJECTION, SOLUTION INTRAVENOUS at 13:15

## 2021-06-03 RX ADMIN — GABAPENTIN 300 MG: 300 CAPSULE ORAL at 06:01

## 2021-06-03 RX ADMIN — DOCUSATE SODIUM 100 MG: 100 CAPSULE ORAL at 17:17

## 2021-06-03 RX ADMIN — PHENYLEPHRINE HYDROCHLORIDE 50 MCG/MIN: 10 INJECTION INTRAVENOUS at 07:46

## 2021-06-03 RX ADMIN — ACETAMINOPHEN 650 MG: 325 TABLET, FILM COATED ORAL at 21:43

## 2021-06-03 RX ADMIN — FENTANYL CITRATE 50 MCG: 50 INJECTION INTRAMUSCULAR; INTRAVENOUS at 08:05

## 2021-06-03 RX ADMIN — MIDAZOLAM 1 MG: 1 INJECTION INTRAMUSCULAR; INTRAVENOUS at 07:29

## 2021-06-03 RX ADMIN — OXYCODONE HYDROCHLORIDE 10 MG: 5 TABLET ORAL at 18:51

## 2021-06-03 RX ADMIN — CEFAZOLIN SODIUM 1000 MG: 1 SOLUTION INTRAVENOUS at 15:32

## 2021-06-03 RX ADMIN — FENTANYL CITRATE 50 MCG: 50 INJECTION INTRAMUSCULAR; INTRAVENOUS at 08:09

## 2021-06-03 RX ADMIN — PROPOFOL 50 MG: 10 INJECTION, EMULSION INTRAVENOUS at 08:04

## 2021-06-03 RX ADMIN — CEFAZOLIN SODIUM 2000 MG: 2 SOLUTION INTRAVENOUS at 07:28

## 2021-06-03 RX ADMIN — OXYCODONE HYDROCHLORIDE 10 MG: 5 TABLET ORAL at 13:19

## 2021-06-03 RX ADMIN — PROPOFOL 80 MCG/KG/MIN: 10 INJECTION, EMULSION INTRAVENOUS at 07:46

## 2021-06-03 NOTE — INTERVAL H&P NOTE
H&P reviewed  After examining the patient I find no changes in the patients condition since the H&P had been written  Vitals:    06/03/21 0604   BP: 149/97   Pulse: 75   Resp: 18   Temp: 98 2 °F (36 8 °C)   SpO2: 100%   Head:  Present  CVS:  RRR  Lungs:  Clear bilateral  Abdomen:  Present  Assessment:  Symptomatic osteoarthritis left hip with this adult female a continues to cause pain and dysfunction  Plan:   To OR for left total hip arthroplasty

## 2021-06-03 NOTE — DISCHARGE INSTRUCTIONS
Discharge Instructions - Orthopedics  Yas Calderón 69 y o  female MRN: [id number]  Unit/Bed#: PACU 13    Weight Bearing Status:                                           Weight Bearing as tolerated to the left lower extremity  DVT prophylaxis:  Complete course of Lovenox as directed    Pain:  Continue analgesics as directed    Showering Instructions:   Do not shower until follow-up appointment    Dressing Instructions:   Keep dressing clean, dry and intact until follow up appointment  Driving Instructions:  No driving until cleared by Orthopaedic Surgery  PT/OT:  Continue PT/OT on outpatient basis as directed    Appt Instructions:    If you do not have your appointment, please call the clinic at [phone]  Otherwise followup as scheduled below:

## 2021-06-03 NOTE — CONSULTS
Office Visit    4/16/2021  [address] [facility] [place]     Candance Knife, MD  Orthopedic Surgery  Left hip pain +1 more  Dx  Left Hip - Follow-up ; Referred by Mary Lou Greenberg MD  Reason for Visit   Progress Notes    Expand All Collapse All    Assessment:  1  Left hip pain      2  Arthritis of left hip            Plan:  The patient will be scheduled for left total hip arthroplasty  We feel the patient will find significant relief per current symptoms, findings on imaging and exam   Risks, benefits, precautions and expectations were discussed  Risks include blood loss, potential infection and blood clots  Preoperative vitamins were prescribed  The patient should follow up after surgery           To do next visit:  Return for post-op visit       The above stated was discussed in layman's terms and the patient expressed understanding  All questions were answered to the patient's satisfaction               Scribe Attestation    I,:  Javi Amezquita am acting as a scribe while in the presence of the attending physician :       I,:  Candance Knife, MD personally performed the services described in this documentation    as scribed in my presence  :                 Subjective:   Laron Bassett is a 69 y o  female who presents for follow up of left hip  Today she complains of left groin pain  She rates her pain at 0/10 and 4/10 at its worse  She describes the pain as stabbing  Most activity can randomly aggravate as well as prolonged activity such as steps  She does use Tylneol 3x/day with some relief    She has had left IA hip injections with short-lived benefit           Review of systems negative unless otherwise specified in HPI     Medical History        Past Medical History:   Diagnosis Date    Breast cancer ([facility] [address] ) 03/01/1993    Hematuria       LAST ASSESSED [id number]           Surgical History         Past Surgical History:   Procedure Laterality Date    BREAST LUMPECTOMY Right 04/01/1993  FL INJECTION LEFT HIP (NON ARTHROGRAM)   1/19/2021    LYMPH NODE BIOPSY        MOHS SURGERY                     Family History   Problem Relation Age of Onset    Breast cancer Mother 79    No Known Problems Sister      No Known Problems Daughter      No Known Problems Sister      No Known Problems Sister      No Known Problems Sister      No Known Problems Daughter           Social History            Occupational History    Not on file   Tobacco Use    Smoking status: Never Smoker    Smokeless tobacco: Never Used   Substance and Sexual Activity    Alcohol use:  Yes       Frequency: 4 or more times a week       Drinks per session: 1 or 2       Binge frequency: Never    Drug use: No    Sexual activity: Not Currently            Current Outpatient Medications:     acetaminophen (Acetaminophen 8 Hour) 650 mg CR tablet, Take 650 mg by mouth every 8 (eight) hours as needed for mild pain, Disp: , Rfl:     Calcium 500 MG tablet, Take by mouth, Disp: , Rfl:     Multiple Vitamin (MULTI-VITAMIN DAILY) TABS, Take by mouth, Disp: , Rfl:     Progesterone Micronized 10 % CREA, Place on the skin daily , Disp: , Rfl:     rosuvastatin (CRESTOR) 10 MG tablet, TAKE 1 TABLET BY MOUTH EVERY DAY, Disp: 90 tablet, Rfl: 1    valsartan (DIOVAN) 80 mg tablet, Take 1 tablet (80 mg total) by mouth daily, Disp: 30 tablet, Rfl: 5     No Known Allergies               Vitals:     04/16/21 1312   BP: 165/91   Pulse: 87   Resp: 18         Objective:  Physical exam  · General: Awake, Alert, Oriented  · Eyes: Pupils equal, round and reactive to light  · Heart: regular rate and rhythm  · Lungs: No audible wheezing  · Abdomen: soft                     Ortho Exam  Left hip:  Apprehension with ROM  Groin pain with IR  ER with flexion  Calf compartments soft and supple  Sensation intact  Toes are warm sensate and mobile        Diagnostics, reviewed and taken today if performed as documented:     None performed      Procedures, if performed today:     Procedures     None performed       Portions of the record may have been created with voice recognition software   Occasional wrong word or "sound a like" substitutions may have occurred due to the inherent limitations of voice recognition software   Read the chart carefully and recognize, using context, where substitutions have occurred          Instructions       Return for post-op visit   After Visit Summary (Automatic SnapShot taken 4/16/2021)  Additional Documentation    Vitals:    /91   Pulse 87   Resp 18   Ht 5' 2" (1 575 m)   Wt 55 3 kg (122 lb)   BMI 22 31 kg/m²   BSA 1 55 m²      More Vitals   Flowsheets:    Covid Symptom Screening      SmartForms:     [facility]/PC[facility] WRAP UP REQUIREMENTS ADVANCED     [facility] SCRIBE ATTESTATION      Encounter Info:    Billing Info,   History,   Allergies,   Detailed Report      Orders Placed       Labs     APTT     C-reactive protein     CBC and differential     Comprehensive metabolic panel     Protime-INR     Hemoglobin A1C W/EAG Estimation     PAT Covid Screening     Type and screen     Karrie Nissen  (6 more)     Other Orders     Ambulatory referral to Family Practice Closed     Ambulatory referral to Physical Therapy Pending Review     Case request operating room: ARTHROPLASTY HIP TOTAL Once        All Encounter Results   Medication Changes       Ascorbic Acid 500 mg Oral Daily, Start 30 days prior to surgery     Enoxaparin Sodium 40 mg Subcutaneous Daily     Ferrous Sulfate 324 mg Oral 2 times daily before meals, Start 30 days prior to surgery     Folic Acid 1 mg Oral Daily, Start 30 days prior to surgery     Medication List   Visit Diagnoses       Left hip pain     Arthritis of left hip     Problem List

## 2021-06-03 NOTE — ANESTHESIA PROCEDURE NOTES
Spinal Block    Patient location during procedure: OR  Start time: 6/3/2021 7:38 AM  Staffing  Resident/CRNA: Napoleon Medina CRNA  Performed: resident/CRNA   Preanesthetic Checklist  Completed: patient identified, site marked, surgical consent, pre-op evaluation, timeout performed, IV checked, risks and benefits discussed and monitors and equipment checked  Spinal Block  Patient position: sitting  Prep: Betadine  Patient monitoring: heart rate, cardiac monitor, continuous pulse ox and frequent blood pressure checks  Approach: midline  Location: L2-3  Injection technique: single-shot  Needle  Needle type: pencil-tip   Needle gauge: 27 G  Needle length: 10 cm  Assessment  Sensory level: T10  Injection Assessment:  negative aspiration for heme, no paresthesia on injection and positive aspiration for clear CSF    Post-procedure:  site cleaned

## 2021-06-03 NOTE — OP NOTE
OPERATIVE REPORT  PATIENT NAME: Zuleyma Hyman    :    MRN: [id number]  Pt Location: [facility] OR ROOM 15    SURGERY DATE: 6/3/2021    Surgeon(s) and Role:     * Loulou Sena MD - Primary     * Bhaskar Grimes PA-C - Assisting    Preop Diagnosis:  Arthritis of left hip [M16 12]    Post-Op Diagnosis Codes:     * Arthritis of left hip [M16 12]    Procedure(s) (LRB):  ARTHROPLASTY HIP TOTAL (Left)    Specimen(s):  * No specimens in log *    Estimated Blood Loss:   Minimal    Drains:  Urethral Catheter Latex 16 Fr  (Active)   Number of days: 0       Anesthesia Type:   Spinal    Operative Indications:  Arthritis of left hip [M16 12]      Operative Findings:  depuy   Cup-50mm metal   Liner-10 degree lipped poly   Head/neck-32 +1mm metal   [id number]    Complications:   None    Procedure and Technique: Following induction of adequate level of general anesthesia, Mallory catheters and sterilely introduced this patient's bladder  Antibiotics administered  She was then placed in the right lateral decubitus, left side up position  An axillary roll was placed underneath the right axilla  The left hip and lateral thigh were then prepped draped sterilely  A posterior-lateral approach was created order gain access to the hip  Full-thickness flaps raised in order to access the tensor fascia  This was split expose the deep layer the hip  With the hip in internal rotation, the piriformis tendon was identified, transected,  And retracted in a posterior fashion  The remainder of the short external rotators were sectioned as well  A T-type capsulotomy was used open the hip  The femoral head was then delivered posteriorly  Utilizing the femoral neck osteotomy guide, the proper femoral cut was made  A posterior capsulectomy, anterior capsulotomy then created or circumferentially expose the acetabulum  Reaming started at 40 extended to 48 which point time hemisphere of bleeding cancellous bone countered    Fifty trial was inserted and noted to fit well, therefore the 50 mm insert was then hammered into place  A single screw was then placed within the posterior superior quadrant for additional fixation  The 10 degree lip liner was snapped into position  The proximal femur was then prepared for insertion of Press-Fit component  Box osteotome was used of the proximal femur  The patient's canal sequentially broached  With a 12  In and a 30 2+1 femoral head and neck, hip was located, taken through range of motion, found to be quite stable  The trial components removed if the hip was carefully dislocated  The insert components were assembled and introduced the patient's left hip in standard fashion  Hip was once more located, taken through range of motion, found to be quite stable  Satisfied with the extent of surgery, the wounds then flushed with saline closed  A Betadine soak was initiated  The piriformis tendon was reapproximated to the greater trochanter number Vicryl suture  The tensor fascia was then closed number Vicryl suture  The subcu tissue closed mixture 1  For deep layer, 2 O Vicryl for the subcutaneous tissues, and skin staples the skin  Sterile dressings were applied    She was then awakened from general anesthesia, and taken recovery room stable condition with plans to include physical therapy weight-bearing to tolerance, she will require DVT prophylaxis with Lovenox   I was present for the entire procedure    Patient Disposition:  PACU     SIGNATURE: Alber Nur MD  DATE: Nayely 3, 2021  TIME: 8:47 AM

## 2021-06-03 NOTE — PLAN OF CARE
Problem: PHYSICAL THERAPY ADULT  Goal: Performs mobility at highest level of function for planned discharge setting  See evaluation for individualized goals  Description: Treatment/Interventions: Functional transfer training, LE strengthening/ROM, Therapeutic exercise, Endurance training, Bed mobility, Gait training, Elevations  Equipment Recommended: Walker       See flowsheet documentation for full assessment, interventions and recommendations  Note: Prognosis: Good  Problem List: Decreased strength, Decreased endurance, Decreased mobility, Pain, Orthopedic restrictions  Assessment: Pt is a 68 yo female admitted to [place] [address] on 6/3/2021 s/p L total hip arthroplasty  PT is WBAT on LE nad has posterior hip precautions  Pt should also use a hip abd pillow  Two patient identifiers were used to confirm  Pt lives in a [facility] with [address] with her spouse  Pt was I for ADL's and mobility prior  Pt did not use any DME  Pt drives and volunteers  Pt's impairments include reduced mobility, risk of falling, reduced strength in LLE compared to R LE, some numbness on buttock, posterior hip precautions  These impairments limit the ability of the patient to perform mobility without increased assistance, return to PLOF and participate in everyday life activities  Pt would benefit from continued skilled therapy while in the hospital to improve overall mobility and work toward a safe d/c  Recommend discharge to home with OPPT  At the end of the session the patient was left in seated position with call bell and phone within reach  The patient's AM-PAC Basic Mobility Inpatient Short Form Raw Score is 18, Standardized Score is 41 05  A standardized score less than 42 9 suggests the patient may benefit from discharge to home  Please also refer to the recommendation of the Physical Therapist for safe discharge planning  Anticipate the patient will progress to home with OP PT   Pt educated about WB status, what to expect with therapy during her stay and hip precautions  BP supine 90/61 mmHg post activity patients BP did not drop  Barriers to Discharge: Inaccessible home environment        PT Discharge Recommendation: Home with outpatient rehabilitation     PT - OK to Discharge: No    See flowsheet documentation for full assessment

## 2021-06-03 NOTE — CONSULTS
Internal Medicine  Consultation Note    Patient: Vimal Palma  Age/sex: 69 y o  female  Medical Record #: [id number]    Assessment/Plan    Status Post left Total HIP ARTHROPLASTY   Continue post op pain control measures as prescribed   Follow bowel regimen to help decrease narcotic induced constipation    Follow post operative hemoglobin with serial CBC and treat accordingly   Monitor WBC and fever curve post op while encouraging use of incentive spirometer   DVT prophylaxis in place and reviewed  HTN  · Hold valsartan in the post-op period  · May resume at discharge  · Add Hydralazine as needed for SBP >160  · Monitor trend  Hyperlipidemia  · Low cholesterol diet  · Resume Crestor at discharge  PRE-OP HGB LEVEL: 13 2    Subjective/ HPI: Vimal Palma was seen and examined  Hx of HIP pain failed out patient conservative measures  Elected to undergo total HIP arthroplasty We are asked to see patient for post op management of underlying medical co-morbidities as outlined above  Pt did well intra and post operatively with good hemodynamics  Pt currently comfortable and without any reported post op nausea  ROS:   A 10 point ROS was performed; negative except as noted above       Social History:    Substance Use History:   Social History     Substance and Sexual Activity   Alcohol Use Yes    Frequency: 4 or more times a week    Drinks per session: 1 or 2    Binge frequency: Never     Social History     Tobacco Use   Smoking Status Never Smoker   Smokeless Tobacco Never Used     Social History     Substance and Sexual Activity   Drug Use No       Family History:    Family History   Problem Relation Age of Onset    Breast cancer Mother 79    No Known Problems Sister     No Known Problems Daughter     No Known Problems Sister     No Known Problems Sister     No Known Problems Sister     No Known Problems Daughter          Review of Scheduled Meds:  Current Facility-Administered Medications   Medication Dose Route Frequency Provider Last Rate    enoxaparin  40 mg Subcutaneous Q24H Nicki Mendez MD      HYDROmorphone  0 5 mg Intravenous Q5 Min PRN Eva Burks MD      lactated ringers  125 mL/hr Intravenous Continuous Nicki Mendez MD      ondansetron  4 mg Intravenous Once PRN Eva Burks MD         Labs: Invalid input(s): LABGLOM, CMP             Results from last 7 days   Lab Units 21  0901   POC GLUCOSE mg/dl 93       Lab Results   Component Value Date    URINECX No Growth <1000 cfu/mL 10/28/2016    URINECX <10,000 cfu/ml Mixed Contaminants X2 2016       Input and Output Summary (last 24 hours): Intake/Output Summary (Last 24 hours) at 6/3/2021 1037  Last data filed at 6/3/2021 1001  Gross per 24 hour   Intake 900 ml   Output 300 ml   Net 600 ml       Imaging:     No orders to display       *Labs /Radiology studiesLabs reviewed  *Medications reviewed and reconciled as needed  *Please refer to order section for additional ordered labs studies  *Case discussed with primary attending during morning huddle case rounds    Vitals:   Temp (24hrs), Av °F (36 7 °C), Min:97 8 °F (36 6 °C), Max:98 2 °F (36 8 °C)    Temp:  [97 8 °F (36 6 °C)-98 2 °F (36 8 °C)] 98 °F (36 7 °C)  HR:  [56-75] 62  Resp:  [9-23] 9  BP: (117-149)/(67-97) 132/78  SpO2:  [99 %-100 %] 100 %  Body mass index is 22 31 kg/m²       Physical Exam:   General Appearance: no distress, conversive  HEENT: PERRLA, conjuctiva normal; oropharynx clear; mucous membranes moist;   Neck:  Supple, no lymphadenopathy or thyromegaly  Lungs: CTA, normal respiratory effort, no retractions, expiratory effort normal  CV: regular rate and rhythm , PMI normal   ABD: soft non tender, no masses , no hepatic or splenomegaly  EXT: DP pulses intact, no lymphadenopathy, no edema ;  left HIP dressing in place  Skin: normal turgor, normal texture, no rash  Psych: affect normal, mood normal  Neuro: AAOx3          Invasive Devices     Peripheral Intravenous Line            Peripheral IV 06/03/21 Left Hand less than 1 day          Drain            Urethral Catheter Latex 16 Fr  less than 1 day                   Code Status: No Order  Current Length of Stay: 0 day(s)    Total floor / unit time spent today 1 hour with more than 50% spent counseling/coordinating care  Counseling includes discussion with patient re: progress  and discussion with patient of his/her current medical state/information  Coordination of patient's care was performed in conjunction with primary service  Time invested included review of patient's labs, vitals, and management of their comorbidities with continued monitoring  In addition, this patient was discussed with medical team including physician and advanced extenders  The care of the patient was extensively discussed and appropriate treatment plan was formulated unique for this patient  ** Please Note: Fluency Direct voice to text software may have been used in the creation of this document   Audio transcription errors may occur**

## 2021-06-03 NOTE — PLAN OF CARE
Problem: Prexisting or High Potential for Compromised Skin Integrity  Goal: Skin integrity is maintained or improved  Description: INTERVENTIONS:  - Identify patients at risk for skin breakdown  - Assess and monitor skin integrity  - Assess and monitor nutrition and hydration status  - Monitor labs   - Assess for incontinence   - Turn and reposition patient  - Assist with mobility/ambulation  - Relieve pressure over bony prominences  - Avoid friction and shearing  - Provide appropriate hygiene as needed including keeping skin clean and dry  - Evaluate need for skin moisturizer/barrier cream  - Collaborate with interdisciplinary team   - Patient/family teaching  - Consider wound care consult   Outcome: Progressing     Problem: Potential for Falls  Goal: Patient will remain free of falls  Description: INTERVENTIONS:  - Assess patient frequently for physical needs  -  Identify cognitive and physical deficits and behaviors that affect risk of falls    -  [place] fall precautions as indicated by assessment   - Educate patient/family on patient safety including physical limitations  - Instruct patient to call for assistance with activity based on assessment  - Modify environment to reduce risk of injury  - Consider OT/PT consult to assist with strengthening/mobility  Outcome: Progressing

## 2021-06-03 NOTE — CONSULTS
Internal Medicine  Consultation Note     Patient: Titi Delvalle  Age/sex: 69 y o  female  Medical Record #: [id number]     Assessment/Plan     Status Post left Total HIP ARTHROPLASTY  · Continue post op pain control measures as prescribed  · Follow bowel regimen to help decrease narcotic induced constipation  ·  Follow post operative hemoglobin with serial CBC and treat accordingly  · Monitor WBC and fever curve post op while encouraging use of incentive spirometer  · DVT prophylaxis in place and reviewed      HTN  · Hold valsartan in the post-op period  · May resume at discharge  · Add Hydralazine as needed for SBP >160  · Monitor trend      Hyperlipidemia  · Low cholesterol diet  · Resume Crestor at discharge      PRE-OP HGB LEVEL: 13 2     Subjective/ HPI: Titi Delvalle was seen and examined  Hx of HIP pain failed out patient conservative measures  Elected to undergo total HIP arthroplasty We are asked to see patient for post op management of underlying medical co-morbidities as outlined above  Pt did well intra and post operatively with good hemodynamics   Pt currently comfortable and without any reported post op nausea                 ROS:   A 10 point ROS was performed; negative except as noted above       Social History:     Substance Use History:   Social History           Substance and Sexual Activity   Alcohol Use Yes    Frequency: 4 or more times a week    Drinks per session: 1 or 2    Binge frequency: Never      Social History          Tobacco Use   Smoking Status Never Smoker   Smokeless Tobacco Never Used      Social History          Substance and Sexual Activity   Drug Use No         Family History:           Family History   Problem Relation Age of Onset    Breast cancer Mother 79    No Known Problems Sister      No Known Problems Daughter      No Known Problems Sister      No Known Problems Sister      No Known Problems Sister      No Known Problems Daughter              Review of Scheduled Meds:           Current Facility-Administered Medications   Medication Dose Route Frequency Provider Last Rate    enoxaparin  40 mg Subcutaneous Q24H Garrett Yousif MD      HYDROmorphone  0 5 mg Intravenous Q5 Min PRN Chiquis Goins MD      lactated ringers  125 mL/hr Intravenous Continuous Garrett Yousif MD      ondansetron  4 mg Intravenous Once PRN Chiquis Goins MD           Labs:                 Invalid input(s): LABGLOM, CMP                  Results from last 7 days   Lab Units 21  0901   POC GLUCOSE mg/dl 93               Lab Results   Component Value Date     URINECX No Growth <1000 cfu/mL 10/28/2016     URINECX <10,000 cfu/ml Mixed Contaminants X2 2016         Input and Output Summary (last 24 hours):         Intake/Output Summary (Last 24 hours) at 6/3/2021 1037  Last data filed at 6/3/2021 1001  Gross per 24 hour   Intake 900 ml   Output 300 ml   Net 600 ml         Imaging:      No orders to display         *Labs /Radiology studiesLabs reviewed  *Medications reviewed and reconciled as needed  *Please refer to order section for additional ordered labs studies  *Case discussed with primary attending during morning huddle case rounds     Vitals:   Temp (24hrs), Av °F (36 7 °C), Min:97 8 °F (36 6 °C), Max:98 2 °F (36 8 °C)     Temp:  [97 8 °F (36 6 °C)-98 2 °F (36 8 °C)] 98 °F (36 7 °C)  HR:  [56-75] 62  Resp:  [9-23] 9  BP: (117-149)/(67-97) 132/78  SpO2:  [99 %-100 %] 100 %  Body mass index is 22 31 kg/m²       Physical Exam:   General Appearance: no distress, conversive  HEENT: PERRLA, conjuctiva normal; oropharynx clear; mucous membranes moist;   Neck:  Supple, no lymphadenopathy or thyromegaly  Lungs: CTA, normal respiratory effort, no retractions, expiratory effort normal  CV: regular rate and rhythm , PMI normal   ABD: soft non tender, no masses , no hepatic or splenomegaly  EXT: DP pulses intact, no lymphadenopathy, no edema ;  left HIP dressing in place  Skin: normal turgor, normal texture, no rash  Psych: affect normal, mood normal  Neuro: AAOx3                 Invasive Devices            Peripheral Intravenous Line                     Peripheral IV 06/03/21 Left Hand less than 1 day                   Drain                     Urethral Catheter Latex 16 Fr  less than 1 day                        Code Status: No Order  Current Length of Stay: 0 day(s)     Total floor / unit time spent today 1 hour with more than 50% spent counseling/coordinating care  Counseling includes discussion with patient re: progress  and discussion with patient of his/her current medical state/information  Coordination of patient's care was performed in conjunction with primary service  Time invested included review of patient's labs, vitals, and management of their comorbidities with continued monitoring  In addition, this patient was discussed with medical team including physician and advanced extenders  The care of the patient was extensively discussed and appropriate treatment plan was formulated unique for this patient       ** Please Note: Fluency Direct voice to text software may have been used in the creation of this document   Audio transcription errors may occur**

## 2021-06-03 NOTE — PHYSICAL THERAPY NOTE
Physical Therapy Evaluation note    Patient Name: Tory Wheeler    GSWJB'P Date: 6/3/2021     Problem List  Active Problems:    * No active hospital problems  *       Past Medical History  Past Medical History:   Diagnosis Date    Breast cancer ([address] ) 03/01/1993    Hematuria     LAST ASSESSED [id number]        Past Surgical History  Past Surgical History:   Procedure Laterality Date    BREAST LUMPECTOMY Right 04/01/1993    COLONOSCOPY      FL INJECTION LEFT HIP (NON ARTHROGRAM)  1/19/2021    LYMPH NODE BIOPSY      MOHS SURGERY        06/03/21 1456   PT Last Visit   PT Visit Date 06/03/21   Note Type   Note type Evaluation   Pain Assessment   Pain Assessment Tool 0-10   Pain Score 2   Pain Location/Orientation Orientation: Left; Location: Hip   Home Living   Type of [address] One level   Additional Comments Pt lives with her spouse in a [facility] with [address]  Pt has a full flight to the basement where her laundry is located  Pt stated that she can have assistance upon d/c home  Pt does not own any DME and did not use any prior  Pt drives and volunteers  Pt was I for ADL's and mobility prior      Prior Function   Level of [place] Independent with ADLs and functional mobility   Lives With Spouse   Receives Help From Family   ADL Assistance Independent   IADLs Independent   Vocational Volunteer work   Comments +drives   Restrictions/Precautions   [place] Bearing Precautions Per Order No   Other Precautions Fall Risk;Pain  (chino)   General   Family/Caregiver Present No   Cognition   Overall Cognitive Status WFL   Arousal/Participation Alert   Orientation Level Oriented X4   Memory Within functional limits   Following Commands Follows all commands and directions without difficulty   RLE Assessment   RLE Assessment WFL   LLE Assessment   LLE Assessment X   Strength LLE   L Knee Extension 3-/5   L Ankle Dorsiflexion 3+/5   Coordination   Sensation [facility] Light Touch   RLE Light Touch Grossly intact   LLE Light Touch Grossly intact   Bed Mobility   Supine to Sit 4  Minimal assistance   Additional items Assist x 1   Additional Comments sitting EOB S level   Transfers   Sit to Stand 4  Minimal assistance   Additional items Assist x 1   Stand to Sit 4  Minimal assistance   Additional items Assist x 1   Ambulation/Elevation   Gait pattern Excessively slow; Step to; Foward flexed; Shuffling; Improper Weight shift   Gait Assistance 4  Minimal assist   Additional items Assist x 1   Assistive Device Rolling walker   Distance 3ftx1   Balance   Static Sitting Fair +   Dynamic Sitting Fair   Static Standing Fair -   Dynamic Standing Fair -   Ambulatory Fair -   Endurance Deficit   Endurance Deficit Yes   Activity Tolerance   Activity Tolerance Patient limited by pain   Medical Staff Made Aware restorative aide   Nurse Made Aware nurse approved therapy session   Assessment   Prognosis Good   Problem List Decreased strength;Decreased endurance;Decreased mobility;Pain;Orthopedic restrictions   Assessment Pt is a 70 yo female admitted to [place] [address] on 6/3/2021 s/p L total hip arthroplasty  PT is WBAT on LE nad has posterior hip precautions  Pt should also use a hip abd pillow  Two patient identifiers were used to confirm  Pt lives in a [facility] with [address] with her spouse  Pt was I for ADL's and mobility prior  Pt did not use any DME  Pt drives and volunteers  Pt's impairments include reduced mobility, risk of falling, reduced strength in LLE compared to R LE, some numbness on buttock, posterior hip precautions  These impairments limit the ability of the patient to perform mobility without increased assistance, return to PLOF and participate in everyday life activities  Pt would benefit from continued skilled therapy while in the hospital to improve overall mobility and work toward a safe d/c  Recommend discharge to home with OPPT   At the end of the session the patient was left in seated position with call bell and phone within reach  The patient's AM-PAC Basic Mobility Inpatient Short Form Raw Score is 18, Standardized Score is 41 05  A standardized score less than 42 9 suggests the patient may benefit from discharge to home  Please also refer to the recommendation of the Physical Therapist for safe discharge planning  Anticipate the patient will progress to home with OP PT  Pt educated about WB status, what to expect with therapy during her stay and hip precautions  BP supine 90/61 mmHg post activity patients BP did not drop  Barriers to Discharge Inaccessible home environment   Goals   STG Expiration Date 06/17/21   Short Term Goal #1 STG 1: Pt will perform transfers at a MI level to return to baseline of function  STG 2: Pt will ambulate 300ft with RW/least restrictive device at a MI level to reduce the level of assistance needed upon d/c home  STG 3: Pt will negotiate 7 steps with HR at a I level to ensure safety with activity when able to ambulate 50ft at a min A level  STG 4: Pt will perform bed mobility at a I to safety return to PLOF  PT Treatment Day 0   Plan   Treatment/Interventions Functional transfer training;LE strengthening/ROM; Therapeutic exercise; Endurance training;Bed mobility;Gait training;Elevations   PT Frequency Other (Comment)  BID   Recommendation   PT Discharge Recommendation Home with outpatient rehabilitation   Equipment Recommended [facility] Recommended Wheeled walker   Change/add to [place]?  No   PT - OK to Discharge No   Additional Comments need to try steps    AM-PAC Basic Mobility Inpatient   Turning in Bed Without Bedrails 3   Lying on Back to Sitting on Edge of Flat Bed 3   Moving Bed to Chair 3   Standing Up From Chair 3   Walk in Room 3   Climb 3-5 Stairs 3   Basic Mobility Inpatient Raw Score 18   Basic Mobility Standardized Score 41 05   Niya Sierra, PT, DPT

## 2021-06-03 NOTE — ANESTHESIA POSTPROCEDURE EVALUATION
Post-Op Assessment Note    CV Status:  Stable  Pain Score: 0    Pain management: adequate     Mental Status:  Alert and awake   Hydration Status:  Euvolemic   PONV Controlled:  Controlled   Airway Patency:  Patent      Post Op Vitals Reviewed: Yes      Staff: CRNA         No complications documented      /73 (06/03/21 0857)    Temp      Pulse 70 (06/03/21 0857)   Resp 18 (06/03/21 0857)    SpO2 99 % (06/03/21 0857)

## 2021-06-04 VITALS
WEIGHT: 122 LBS | HEART RATE: 79 BPM | DIASTOLIC BLOOD PRESSURE: 68 MMHG | SYSTOLIC BLOOD PRESSURE: 104 MMHG | OXYGEN SATURATION: 96 % | HEIGHT: 62 IN | RESPIRATION RATE: 18 BRPM | TEMPERATURE: 98.8 F | BODY MASS INDEX: 22.45 KG/M2

## 2021-06-04 PROBLEM — Z96.642 STATUS POST TOTAL HIP REPLACEMENT, LEFT: Status: ACTIVE | Noted: 2021-06-04

## 2021-06-04 LAB
ANION GAP SERPL CALCULATED.3IONS-SCNC: 3 MMOL/L (ref 4–13)
BUN SERPL-MCNC: 11 MG/DL (ref 5–25)
CALCIUM SERPL-MCNC: 8.8 MG/DL (ref 8.3–10.1)
CHLORIDE SERPL-SCNC: 107 MMOL/L (ref 100–108)
CO2 SERPL-SCNC: 31 MMOL/L (ref 21–32)
CREAT SERPL-MCNC: 0.45 MG/DL (ref 0.6–1.3)
ERYTHROCYTE [DISTWIDTH] IN BLOOD BY AUTOMATED COUNT: 13.9 % (ref 11.6–15.1)
GFR SERPL CREATININE-BSD FRML MDRD: 101 ML/MIN/1.73SQ M
GLUCOSE SERPL-MCNC: 122 MG/DL (ref 65–140)
HCT VFR BLD AUTO: 32.8 % (ref 34.8–46.1)
HGB BLD-MCNC: 10.6 G/DL (ref 11.5–15.4)
MCH RBC QN AUTO: 29.9 PG (ref 26.8–34.3)
MCHC RBC AUTO-ENTMCNC: 32.3 G/DL (ref 31.4–37.4)
MCV RBC AUTO: 92 FL (ref 82–98)
PLATELET # BLD AUTO: 169 THOUSANDS/UL (ref 149–390)
PMV BLD AUTO: 10.9 FL (ref 8.9–12.7)
POTASSIUM SERPL-SCNC: 3.6 MMOL/L (ref 3.5–5.3)
RBC # BLD AUTO: 3.55 MILLION/UL (ref 3.81–5.12)
SODIUM SERPL-SCNC: 141 MMOL/L (ref 136–145)
WBC # BLD AUTO: 8.74 THOUSAND/UL (ref 4.31–10.16)

## 2021-06-04 PROCEDURE — 80048 BASIC METABOLIC PNL TOTAL CA: CPT | Performed by: ORTHOPAEDIC SURGERY

## 2021-06-04 PROCEDURE — NC001 PR NO CHARGE: Performed by: ORTHOPAEDIC SURGERY

## 2021-06-04 PROCEDURE — 97166 OT EVAL MOD COMPLEX 45 MIN: CPT

## 2021-06-04 PROCEDURE — 97530 THERAPEUTIC ACTIVITIES: CPT

## 2021-06-04 PROCEDURE — 99225 PR SBSQ OBSERVATION CARE/DAY 25 MINUTES: CPT | Performed by: INTERNAL MEDICINE

## 2021-06-04 PROCEDURE — 85027 COMPLETE CBC AUTOMATED: CPT | Performed by: ORTHOPAEDIC SURGERY

## 2021-06-04 PROCEDURE — 97116 GAIT TRAINING THERAPY: CPT

## 2021-06-04 RX ORDER — DOCUSATE SODIUM 100 MG/1
100 CAPSULE, LIQUID FILLED ORAL 2 TIMES DAILY
Qty: 10 CAPSULE | Refills: 0 | Status: SHIPPED | OUTPATIENT
Start: 2021-06-04 | End: 2021-09-17

## 2021-06-04 RX ORDER — METHOCARBAMOL 500 MG/1
500 TABLET, FILM COATED ORAL 4 TIMES DAILY
Qty: 40 TABLET | Refills: 0 | Status: SHIPPED | OUTPATIENT
Start: 2021-06-04 | End: 2021-09-17

## 2021-06-04 RX ADMIN — SENNOSIDES 8.6 MG: 8.6 TABLET, FILM COATED ORAL at 08:47

## 2021-06-04 RX ADMIN — OXYCODONE HYDROCHLORIDE 10 MG: 5 TABLET ORAL at 06:09

## 2021-06-04 RX ADMIN — OXYCODONE HYDROCHLORIDE 5 MG: 5 TABLET ORAL at 00:25

## 2021-06-04 RX ADMIN — IRON SUCROSE 200 MG: 20 INJECTION, SOLUTION INTRAVENOUS at 10:44

## 2021-06-04 RX ADMIN — DOCUSATE SODIUM 100 MG: 100 CAPSULE ORAL at 08:47

## 2021-06-04 RX ADMIN — CEFAZOLIN SODIUM 1000 MG: 1 SOLUTION INTRAVENOUS at 00:25

## 2021-06-04 RX ADMIN — OXYCODONE HYDROCHLORIDE 5 MG: 5 TABLET ORAL at 10:43

## 2021-06-04 NOTE — DISCHARGE SUMMARY
ORTHOPEDICS DISCHARGE SUMMARY   Blas Cui 69 y o  female MRN: [id number]  Unit/Bed#: -15      Attending Physician: Nai Adam    Admitting diagnosis: Arthritis of left hip [M16 12]    Discharge diagnosis: Arthritis of left hip [M16 12]    Date of admission: 6/3/2021    Date of discharge: 06/04/21    Procedure: Left total hip arthroplasty    HPI  This is a 69y o  year old female that presented to the office with signs and symptoms of left hip osteoarthritis  They tried and failed conservative treatment measures and wished to proceed with surgical intervention  The risks, benefits, and complications of the procedure were discussed with the patient and informed consent was obtained  Hospital Course: The patient was admitted to the hospital on 6/3/2021 and underwent an uncomplicated left total hip arthroplasty  They were transferred to the floor after a brief stay in the post-anesthesia care unit  Their pain was well managed with IV and oral pain medications  They began therapy on post operative day #1  Lovenox was also started for DVT prophylaxis 12 hours post op  On discharge date pt was cleared by PT and the medicine team and determined to be safe for discharge  Daily discussion was had with the patient, nursing staff, orthopaedic team, and family members if present  All questions were answered to the patients satisifaction  0   Lab Value Date/Time    HGB 10 6 (L) 06/04/2021 0550    HGB 13 2 05/17/2021 0758    HGB 13 0 03/01/2021 0805    HGB 13 8 08/31/2020 0740    HGB 12 9 02/20/2020 0807    HGB 13 5 08/20/2019 0748    HGB 12 9 02/14/2019 0757    HGB 12 5 08/13/2018 0749    HGB 13 3 02/07/2018 0801    HGB 12 9 08/16/2017 1117    HGB 12 8 07/12/2017 0804    HGB 14 8 07/12/2016 0758    HGB 13 4 07/02/2015 0835       Greater than 2 gram drop which qualifies for diagnosis of acute blood loss anemia  Vital signs remained stable and pt was resuscitated with IVF as needed   Body mass index is 22 31 kg/m²  Normal weight  Recommend nutrition  Discharge Instructions: The patient was discharged weight bearing as tolerated to the left lower extremity  Lovenox will be continued for 28 days  Continue PT/OT  Take pain medications as instructed  Discharge Medications: For the complete list of discharge medications, please refer to the patient's medication reconciliation

## 2021-06-04 NOTE — PHYSICAL THERAPY NOTE
PT TREATMENT       06/04/21 0819   PT Last Visit   PT Visit Date 06/04/21   Note Type   Note Type Treatment   Pain Assessment   Pain Assessment Tool 0-10   Pain Score No Pain  (AT REST)   Restrictions/Precautions   Weight Bearing Precautions Per Order Yes   LLE Weight Bearing Per Order WBAT   Braces or Orthoses   (ABDUCTION PILLOW )   Other Precautions Fall Risk;Pain;WBS;THR   General   Chart Reviewed Yes   Response to Previous Treatment Patient with no complaints from previous session  Family/Caregiver Present No   Cognition   Overall Cognitive Status WFL   Subjective   Subjective "CAN I ELEVATE MY LEGS AT HOME?"   Bed Mobility   Supine to Sit Unable to assess   Sit to Supine Unable to assess   Additional Comments SITTING IN CHAIR UPON PT ARRIVAL -REPORTS AX1 OOB THIS AM AND WILL HAVE ASSISTANCE FROM SPOUSE UPON D/C HOME    Transfers   Sit to Stand 5  Supervision   Additional items Increased time required   Stand to Sit 5  Supervision   Additional items Increased time required   Ambulation/Elevation   Gait pattern Excessively slow; Step to;Short stride;Decreased foot clearance; Improper Weight shift; Antalgic   Gait Assistance 5  Supervision   Additional items Verbal cues   Assistive Device Rolling walker   Distance 50 FEET X 2 (SEATED REST)    Stair Management Assistance 5  Supervision   Additional items Verbal cues   Stair Management Technique One rail R;Foreward;Nonreciprocal   Number of Stairs 10  (5 X 2)   Balance   Static Sitting Good   Static Standing Fair   Ambulatory Fair  (W/ RW)   Endurance Deficit   Endurance Deficit Yes   Endurance Deficit Description POST-OP FATIGUE, PAIN, REDUCED STRENGTH/ROM L LE    Activity Tolerance   Activity Tolerance Patient tolerated treatment well   Medical Staff Made Aware [id number]   Nurse Made Aware BILL TO SEE PER RN RN JORDAN   Assessment   Prognosis Good   Problem List Decreased strength;Decreased range of motion;Decreased endurance; Impaired balance;Decreased mobility;Pain;Orthopedic restrictions   Assessment PT INITIATED TREATMENT SESSION IN ORDER TO ASSIST PATIENT IN ACHIEVING GOALS TO IMPROVE AMBULATION, TRANSFERS, AND STAIR NAVIGATION WITH ANTICIPATION PATIENT WILL D/C HOME TODAY POST-OP (L MAGDALENA, + PRECAUTIONS)  PATIENT ABLE TO RECALL 3/3 THP AND MAINTAIN COMPLIANCE W/O VC DURING TREATMENT SESSION  AT THIS TIME, SHE IS PERFORMING ALL ASPECTS OF FUNCTIONAL MOBILITY (SIT<-->STAND TRANSFERS, AMBULATION, AND STAIRS) S LEVEL  DURING GAIT TRAINING, MOST FREQUENT VC FOR PATIENT WAS TO NOT MAINTAIN TRUNK SO CLOSE TO FRONT OF RW  SHE IS USING A STEP TO PATTERN WITH DECREASED LE CLEARANCE AND SPEED AND AMBULATED A TOTAL DISTANCE  FEET  PATIENT HAS 7 STEPS TO ENTER HOME W/ R SIDED RAILING  SHE WAS ABLE TO NAVIGATE 5 + 5 STEPS IN NONRECIPCOCAL MANNER WITH B/L UE ON R HAND RAIL SAFELY  IN HER HOME, PATIENT HAS 1 STEP DOWN INTO SUNKEN ROOM  TO SIMULATE THIS, PATIENT ASCENDED/DESCENDED 1 CURB STEP W/ USE OF RW SAFELY  THROUGHOUT TREATMENT SESSION PATIENT PARTICIPATED IN VERBAL EDUCATION AND DEMONSTRATIO BY THERAPIST TO IMPROVE SAFETY, EFFICIENCY, AND MECHANICS OF MOBILITY  SHE DENIES ANY QUESTIONS/CONCERNS ABOUT D/C HOME  PLAN IS FOR D/C HOME WITH SUPPORTIVE SPOUSE, USE OF RW, AND OUTPT PT  SHE IS CLEAR FOR D/C HOME FROM PT STAND POINT  Goals   Patient Goals TO GO HOME TODAY    STG Expiration Date 06/17/21   PT Treatment Day 1   Plan   Treatment/Interventions Functional transfer training;LE strengthening/ROM; Therapeutic exercise; Endurance training;Patient/family training;Equipment eval/education; Bed mobility;Gait training;OT;Spoke to nursing;Elevations   Progress Progressing toward goals   PT Frequency 7x/wk; Twice a day   Recommendation   PT Discharge Recommendation Home with outpatient rehabilitation   Equipment Recommended Walker  (WILL REQUIRE RW FOR D/C HOME)   Kormeli Recommended Wheeled walker   Change/add to Kormeli?  No   PT - OK to Discharge Yes  (HOME, USE OF RW, FAMILY ASSIST PRN, OUTPT PT )   AM-PAC Basic Mobility Inpatient   Turning in Bed Without Bedrails 3   Lying on Back to Sitting on Edge of Flat Bed 3   Moving Bed to Chair 4   Standing Up From Chair 4   Walk in Room 4   Climb 3-5 Stairs 4   Basic Mobility Inpatient Raw Score 22   Basic Mobility Standardized Score 47 4     The patient's AM-PAC Basic Mobility Inpatient Short Form Raw Score is 22, Standardized Score is 47 4  A standardized score greater than 42 9 suggests the patient may benefit from discharge to home  Please also refer to the recommendation of the Physical Therapist for safe discharge planning      AWILDA PENNINGTON PT, DPT

## 2021-06-04 NOTE — PROGRESS NOTES
Subjective: No acute events overnight  No acute distress  Objective:  A 10 point ROS was performed; negative except as noted above       Lab Results   Component Value Date/Time    WBC 4 96 05/17/2021 07:58 AM    WBC 3 8 (L) 07/02/2015 08:35 AM    HGB 13 2 05/17/2021 07:58 AM    HGB 13 4 07/02/2015 08:35 AM       Vitals:    06/04/21 0109   BP: 120/62   Pulse: 80   Resp: 20   Temp: 98 2 °F (36 8 °C)   SpO2:      Left lower extremity  Dressing C/D/I  Abduction pillow in place  Motor intact to EHL/FHL/TA/GS  Sensation intact to light touch to dp/sp/tib/mick/saph distributions  Toes warm and well perfused with brisk capillary refill    Assessment: 69 y o  female post op day #1 from Left total hip arthroplasty     Plan:  WBAT  left lower extremity   Pain control  DVT ppx: Enoxaparin (Lovenox)  PT/OT  Will continue to assess for acute blood loss anemia  Dispo: pending PT progress

## 2021-06-04 NOTE — CASE MANAGEMENT
Pt is not a <30 day readmission or current bundle  Risk of unplanned readmission score is unavailable at this time  Pt s/p left total hip replacement  CM met with pt at bedside to introduce CM role and begin discharge planning  Pt lives with her , Daphne Nichols ([phone]) in a 1 story house that has 7 CLAUDIA with railing  Pt reports being independent with ADLs PTA, no use of DME for amulation  Pt reports no history of VNA, STR, inpatient MH treatment or D/A treatment  Pt drives and is retired  PCP is Dr Irena Mc ([phone]) and pt uses [facility] in [facility] for prescriptions  Pt's  to transport at time of discharge  Pt recommended for RW and BSC, orders placed via [facility] and sent to [employer] for signature  Pt to get OPPT at [facility] with  to transport, Lovenox filled and at home  CM reviewed d/c planning process including the following: identifying help at home, patient preference for d/c planning needs, Discharge Lounge, [facility] Meds to Bed program, availability of treatment team to discuss questions or concerns patient and/or family may have regarding understanding medications and recognizing signs and symptoms once discharged  CM also encouraged patient to follow up with all recommended appointments after discharge  Patient advised of importance for patient and family to participate in managing patients medical well being

## 2021-06-04 NOTE — PROGRESS NOTES
Internal Medicine Progress Note  Patient: Roselyn Morris  Age/sex: 69 y o  female  Medical Record #: [id number]      ASSESSMENT/PLAN: (Interval History)  Roselyn Morris is seen and examined and management for following issues:    Status Post left Total HIP ARTHROPLASTY   Pain controlled   Continue encourage incentive spirometry; monitor fever curve   DVT prophylaxis in place and reviewed  Results from last 7 days   Lab Units 06/04/21  0550   WBC Thousand/uL 8 74   HEMOGLOBIN g/dL 10 6*   HEMATOCRIT % 32 8*   PLATELETS Thousands/uL 169          Operative acute blood loss anemia  · Decrease in hgb levels from preop labs results; suspect due to post operation extravasation losses along with potential dilutional effects of fluids  · Initiate surgery optimization venofer protocol/transfusion  · Transfuse PRBC if hgb less then 8 0 (per ortho protocol)  or symptomatic  · Monitor follow up CBC post intervention to trend effect    HTN  · Hold valsartan in the post-op period  · May resume at discharge  · Add Hydralazine as needed for SBP >160  · Monitor trend      Hyperlipidemia  · Low cholesterol diet  · Resume Crestor at discharge      PRE-OP HGB LEVEL: 13 2    Patient stable for DC from medical standpoint  The above assessment and plan was reviewed and updated as determined by my evaluation of the patient on 6/4/2021      Labs:   Results from last 7 days   Lab Units 06/04/21  0550   WBC Thousand/uL 8 74   HEMOGLOBIN g/dL 10 6*   HEMATOCRIT % 32 8*   PLATELETS Thousands/uL 169     Results from last 7 days   Lab Units 06/04/21  0550   SODIUM mmol/L 141   POTASSIUM mmol/L 3 6   CHLORIDE mmol/L 107   CO2 mmol/L 31   BUN mg/dL 11   CREATININE mg/dL 0 45*   CALCIUM mg/dL 8 8             Results from last 7 days   Lab Units 06/03/21  0901   POC GLUCOSE mg/dl 93       Review of Scheduled Meds:  Current Facility-Administered Medications   Medication Dose Route Frequency Provider Last Rate    acetaminophen  650 mg Oral Q6H PRN Angela Coleman MD      calcium carbonate  1,000 mg Oral Daily PRN Angela Coleman MD      docusate sodium  100 mg Oral BID Angela Coleman MD      enoxaparin  40 mg Subcutaneous Q24H Angela Coleman MD      hydrALAZINE  25 mg Oral Q8H PRN Davida Leonardo PA-C      lactated ringers  1,000 mL Intravenous Once PRN Angela Coleman MD      And    lactated ringers  1,000 mL Intravenous Once PRN Angela Coleman MD Stopped (06/03/21 1239)    lactated ringers  125 mL/hr Intravenous Continuous Angela Coleman MD Stopped (06/04/21 0719)    morphine injection  2 mg Intravenous Q2H PRN Angela Coleman MD      ondansetron  4 mg Intravenous Q6H PRN Angela Coleman MD      oxyCODONE  10 mg Oral Q4H PRN Angela Coleman MD      oxyCODONE  5 mg Oral Q4H PRN Angela Coleman MD      rosuvastatin  10 mg Oral Daily With Michelle Beltran DO      senna  1 tablet Oral Daily Angela Coleman MD      sodium chloride  1,000 mL Intravenous Once PRN Angela Coleman MD      And    sodium chloride  1,000 mL Intravenous Once PRN Angela Coleman MD         Subjective/ HPI: Patient seen and examined  Patients overnight issues or events were reviewed with nursing or staff during rounds or morning huddle session  New or overnight issues include the following:     Pt without any overnight events or reported nursing issues      ROS:   A 10 point ROS was performed; negative except as noted above         Imaging:     No orders to display       *Labs /Radiology studies Reviewed  *Medications  reviewed and reconciled as needed  *Please refer to order section for additional ordered labs studies  *Case discussed with primary attending during morning huddle case rounds    Physical Examination:  Vitals:   Vitals:    06/03/21 1552 06/03/21 1842 06/04/21 0109 06/04/21 0737   BP: 115/69 117/69 120/62 104/68   BP Location:   Left arm    Pulse: 81 79 80 80   Resp: 17 17 20 18   Temp:   98 2 °F (36 8 °C) 98 8 °F (37 1 °C)   TempSrc:   Oral    SpO2: 94% 94%  95%   Weight:       Height:           General Appearance: no distress, conversive  HEENT: PERRLA, conjuctiva normal; oropharynx clear; mucous membranes moist;   Neck:  Supple, no lymphadenopathy or thyromegaly  Lungs: CTA, normal respiratory effort, no retractions, expiratory effort normal  CV: regular rate and rhythm , PMI normal   ABD: soft non tender, no masses , no hepatic or splenomegaly  EXT: DP pulses intact, no lymphadenopathy, no edema; left hip surgical dressing in place  Skin: normal turgor, normal texture, no rash  Psych: affect normal, mood normal  Neuro: AAOx3    : chino removed ; due to void    The above physical exam was reviewed and updated as determined by my evaluation of the patient on 6/4/2021  Invasive Devices     Peripheral Intravenous Line            Peripheral IV 06/03/21 Left Hand 1 day                   VTE Pharmacologic Prophylaxis: Enoxaparin  Code Status: Level 1 - Full Code  Current Length of Stay: 0 day(s)      Total floor / unit time spent today 30 minutes  Coordination of patient's care was performed in conjunction with primary service  Time invested included review of patient's labs, vitals, and management of their comorbidities with continued monitoring, examination of patient as well as answering patient questions, documenting her findings and creating progress note in electronic medical record,  ordering appropriate diagnostic testing  ** Please Note:  voice to text software may have been used in the creation of this document   Although proof errors in transcription or interpretation are a potential of such software**

## 2021-06-04 NOTE — OCCUPATIONAL THERAPY NOTE
Occupational Therapy Evaluation     Patient Name: Eric Sanabria  QDOBZ'V Date: 6/4/2021  Problem List  Principal Problem:    Status post total hip replacement, left    Past Medical History  Past Medical History:   Diagnosis Date    Breast cancer ([facility] [address] ) 03/01/1993    Hematuria     LAST ASSESSED [id number]     Past Surgical History  Past Surgical History:   Procedure Laterality Date    BREAST LUMPECTOMY Right 04/01/1993    COLONOSCOPY      FL INJECTION LEFT HIP (NON ARTHROGRAM)  1/19/2021    LYMPH NODE BIOPSY      MOHS SURGERY               06/04/21 0905   OT Last Visit   OT Visit Date 06/04/21   Note Type   Note type Evaluation   Restrictions/Precautions   Weight Bearing Precautions Per Order Yes   LLE Weight Bearing Per Order WBAT   Other Precautions THR;WBS;Fall Risk;Pain   Pain Assessment   Pain Assessment Tool 0-10   Pain Score 5   Pain Location/Orientation Orientation: Left; Location: Hip   Home Living   Type of [address] [place] [address] One level; Able to live on main level with bedroom/bathroom; Performs ADLs on one level   Bathroom Shower/Tub Walk-in shower   Bathroom Toilet Standard   Bathroom Equipment   (shower stool (no back))   Bathroom Accessibility Accessible   Home Equipment Long-handled shoehorn  (No DME )   Additional Comments 1 SH with 7 CLAUDIA (also 1 step into kitchen); Lives with spouse who has a part time job but can assist as needed when home; pt confirmed additional support from neighbors and friends  Prior Function   Level of [place] Independent with ADLs and functional mobility   Lives With Spouse   Receives Help From Family;Friend(s); Neighbor   ADL Assistance Independent   IADLs Independent   Falls in the last 6 months 0   Vocational Volunteer work   Lifestyle   Autonomy I with ADLs, IADLs, functional mobility, driving, volunteer work      Reciprocal Relationships Lives with     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    Intrinsic Gratification Will continue to assess    Psychosocial   Psychosocial (WDL) WDL   ADL   Where Assessed Chair   Eating Assistance 7  Independent   Grooming Assistance 5  Supervision/Setup   UB Bathing Assistance 5  Supervision/Setup   LB Bathing Assistance 5  Supervision/Setup   UB Dressing Assistance 6  Modified independent   LB Dressing Assistance 5  Supervision/Setup   Toileting Assistance  5  Supervision/Setup   Functional Assistance 5  Supervision/Setup   Additional Comments Pt was instructed on use of AE to complete LB dressing  Pt donned underwear and socks for AE  Once in stance, pt able to thread underwear past hips without need for AD for steadying  Bed Mobility   Additional Comments OOB in recliner upon arrival; pt was left in recliner chair with all needs within reach at the end of session  Transfers   Sit to Stand 5  Supervision   Additional items Increased time required   Stand to Sit 5  Supervision   Additional items Increased time required   Functional Mobility   Additional Comments Unable to assess    Balance   Static Sitting Fair +   Dynamic Sitting Good   Static Standing Fair   Dynamic Standing Fair   Ambulatory Fair   Activity Tolerance   Activity Tolerance Patient tolerated treatment well   Medical Staff Made Aware EDUARDO Julien    Nurse Made Aware yes    RUE Assessment   RUE Assessment WFL   LUE Assessment   LUE Assessment WFL   Hand Function   Gross Motor Coordination Functional   Fine Motor Coordination Functional   Sensation   Additional Comments No apparent deficits   Proprioception   Proprioception No apparent deficits   Vision-Basic Assessment   Current Vision No visual deficits   Vision - Complex Assessment   Ocular Range of Motion [facility]   Perception   Inattention/Neglect Appears intact   Cognition   Overall Cognitive Status WFL   Arousal/Participation Cooperative; Alert; Responsive   Attention Within functional limits   Orientation Level Oriented X4   Memory Within functional limits Following Commands Follows all commands and directions without difficulty   Comments Pt is aware of deficits; able to recall 2/3 precautions  Reinforcement provided with handout to take home  Pt demonstrated proper use of AE to compensate for limitations  Assessment   Prognosis Good   Assessment Pt is a 69 y o female presenting to [facility] with primary dx of Arthritis of left hip  Pt underwent L MAGDALENA with WBAT and posterior hip precautions  Comorbidities include the following: HTN, Thrombocytopenia, and hx of breast cancer  Pt lives with her  in a 1 CLAUDIA WITH 7 CLAUDIA; limited access to DME  PTA, pt was I with daily tasks, driving, and volunteer work  Currently, pt is completing ADLs with supervision and use of AE, and functional transfers with supervision  Functional limitations include increased pain; otherwise pt is functioning close to baseline occupational performance  From an OT standpoint, anticipate d/c home with family support pending medical stability  Pt is not recommended for OT for remainder of IP stay; the following recommendations were provided: long handled adaptive equipment for dressing      Goals   Patient Goals go home    Recommendation   OT Discharge Recommendation No rehabilitation needs   Equipment Recommended Hip Kit ($)   OT - OK to Discharge Yes   AM-PAC Daily Activity Inpatient   Lower Body Dressing 3   Bathing 3   Toileting 3   Upper Body Dressing 4   Grooming 4   Eating 4   Daily Activity Raw Score 21   Daily Activity Standardized Score (Calc for Raw Score >=11) 44 27   AM-PAC Applied Cognition Inpatient   Following a Speech/Presentation 4   Understanding Ordinary Conversation 4   Taking Medications 4   Remembering Where Things Are Placed or Put Away 4   Remembering List of 4-5 Errands 4   Taking Care of Complicated Tasks 3   Applied Cognition Raw Score 23   Applied Cognition Standardized Score 53 08

## 2021-06-04 NOTE — PLAN OF CARE
Problem: PHYSICAL THERAPY ADULT  Goal: Performs mobility at highest level of function for planned discharge setting  See evaluation for individualized goals  Description: Treatment/Interventions: Functional transfer training, LE strengthening/ROM, Therapeutic exercise, Endurance training, Bed mobility, Gait training, Elevations  Equipment Recommended: Walker       See flowsheet documentation for full assessment, interventions and recommendations  Outcome: Progressing  Note: Prognosis: Good  Problem List: Decreased strength, Decreased range of motion, Decreased endurance, Impaired balance, Decreased mobility, Pain, Orthopedic restrictions  Assessment: PT INITIATED TREATMENT SESSION IN ORDER TO ASSIST PATIENT IN ACHIEVING GOALS TO IMPROVE AMBULATION, TRANSFERS, AND STAIR NAVIGATION WITH ANTICIPATION PATIENT WILL D/C HOME TODAY POST-OP (L MAGDALENA, + PRECAUTIONS)  PATIENT ABLE TO RECALL 3/3 THP AND MAINTAIN COMPLIANCE W/O VC DURING TREATMENT SESSION  AT THIS TIME, SHE IS PERFORMING ALL ASPECTS OF FUNCTIONAL MOBILITY (SIT<-->STAND TRANSFERS, AMBULATION, AND STAIRS) S LEVEL  DURING GAIT TRAINING, MOST FREQUENT VC FOR PATIENT WAS TO NOT MAINTAIN TRUNK SO CLOSE TO FRONT OF RW  SHE IS USING A STEP TO PATTERN WITH DECREASED LE CLEARANCE AND SPEED AND AMBULATED A TOTAL DISTANCE  FEET  PATIENT HAS 7 STEPS TO ENTER HOME W/ R SIDED RAILING  SHE WAS ABLE TO NAVIGATE 5 + 5 STEPS IN NONRECIPCOCAL MANNER WITH B/L UE ON R HAND RAIL SAFELY  IN HER HOME, PATIENT HAS 1 STEP DOWN INTO SUNKEN ROOM  TO SIMULATE THIS, PATIENT ASCENDED/DESCENDED 1 CURB STEP W/ USE OF RW SAFELY  THROUGHOUT TREATMENT SESSION PATIENT PARTICIPATED IN VERBAL EDUCATION AND DEMONSTRATIO BY THERAPIST TO IMPROVE SAFETY, EFFICIENCY, AND MECHANICS OF MOBILITY  SHE DENIES ANY QUESTIONS/CONCERNS ABOUT D/C HOME  PLAN IS FOR D/C HOME WITH SUPPORTIVE SPOUSE, USE OF RW, AND OUTPT PT  SHE IS CLEAR FOR D/C HOME FROM PT STAND POINT     Barriers to Discharge: Inaccessible home environment        PT Discharge Recommendation: Home with outpatient rehabilitation     PT - OK to Discharge: Yes(HOME, USE OF RW, FAMILY ASSIST PRN, OUTPT PT )    See flowsheet documentation for full assessment

## 2021-06-07 ENCOUNTER — OFFICE VISIT (OUTPATIENT)
Dept: PHYSICAL THERAPY | Facility: CLINIC | Age: 71
End: 2021-06-07
Payer: COMMERCIAL

## 2021-06-07 ENCOUNTER — TELEPHONE (OUTPATIENT)
Dept: OBGYN CLINIC | Facility: HOSPITAL | Age: 71
End: 2021-06-07

## 2021-06-07 DIAGNOSIS — M16.12 ARTHRITIS OF LEFT HIP: Primary | ICD-10-CM

## 2021-06-07 LAB
DME PARACHUTE DELIVERY DATE ACTUAL: NORMAL
DME PARACHUTE DELIVERY DATE REQUESTED: NORMAL
DME PARACHUTE ITEM DESCRIPTION: NORMAL
DME PARACHUTE ITEM DESCRIPTION: NORMAL
DME PARACHUTE ORDER STATUS: NORMAL
DME PARACHUTE SUPPLIER NAME: NORMAL
DME PARACHUTE SUPPLIER PHONE: NORMAL

## 2021-06-07 PROCEDURE — 97164 PT RE-EVAL EST PLAN CARE: CPT | Performed by: PHYSICAL THERAPIST

## 2021-06-07 PROCEDURE — 97110 THERAPEUTIC EXERCISES: CPT | Performed by: PHYSICAL THERAPIST

## 2021-06-07 NOTE — TELEPHONE ENCOUNTER
S/w Pt for post-op assessment  She reports she is "Doing pretty good"  PT today went well  Swelling is moderate but was reassured by physical therapist  NN also encouraged pt swelling down to toes is not uncommon, she denies calf pain or redness  Pain "minimal"  Not really icing and elevating, will start today  AVS, AVS med list and F/Us reviewed with pt  Taking Tylenol only for pain, 650 mg by mouth every 8 (eight) hours as needed  Doing lovenox each no issues, questions or bleeding  Dressing is clean and dry, no bleeding or drainage  Surgeon 6/11, no barriers  Ambulating using RW, no falls or issues  Denies any CP, SOB, fevers, dizziness or other concerning symptoms to her  Pt encouraged to call with any questions, concerns or issues

## 2021-06-07 NOTE — PROGRESS NOTES
PT Re-Evaluation     Today's date: 2021  Patient name: Laron Bassett  :   MRN: [id number]  Referring provider: Denisha Robertson MD  Dx:   Encounter Diagnosis     ICD-10-CM    1  Arthritis of left hip  M16 12                   Assessment  Assessment details: Patient presents in good standing overall - 4 days post-operative Left MAGDALENA - posterior approach    She present WBAT L w/ RW  She is reporting little - no pain left hip, (+) edema left LE, good sleeping and only using tylenol prn  Evaluation reveals:  Cautious ambulation, weakness, (+) edema,   No signs DVT, denies fevers, no tenderness calf , (-) Homans  She is highly motivated and presents as a good rehab candidate  Impairments: abnormal gait, abnormal or restricted ROM, abnormal movement, activity intolerance, impaired physical strength, lacks appropriate home exercise program and pain with function  Understanding of Dx/Px/POC: good  Goals  STG   1  Patient will demonstrate independence and competence with HEP 2 -4 weeks     2  Normalize gait pattern with RW - WBAT  2-4 weeks  LTG  1  Improved strength left LE 4-6 weeks  2  Progress to ambulation - FWB, no AD 4-6 weeks  3   Edema will be abolished left LE  6-8 weeks  Plan  Plan details: Patient response to treatment will be monitored each session and progressed accordingly    Thank you for this referral    Patient would benefit from: skilled physical therapy  Planned modality interventions: cryotherapy  Planned therapy interventions: IADL retraining, manual therapy, patient education, postural training, strengthening, stretching, therapeutic exercise, flexibility, home exercise program, neuromuscular re-education, balance and gait training  Frequency: 2x week  Duration in weeks: 8  Treatment plan discussed with: patient        Subjective Evaluation    History of Present Illness  Mechanism of injury: Patient had left MAGDALENA 6-3-2020 with posterior approach    She notes that she is doing well overall,  C/o swelling left LE but denies much pain  She will use tylenol prn  Sleeping is good -   tx in/ out car - challenging   Pain  Current pain ratin  At worst pain ratin  Quality: dull ache  Aggravating factors: stair climbing, standing and walking  Progression: improved    Social Support  Lives in: multiple-level home    Patient Goals  Patient goals for therapy: decreased pain, independence with ADLs/IADLs, return to sport/leisure activities and increased motion  Patient goal: "i want to be able to walk"         Objective     Observations   Left Hip  Positive for edema  Neurological Testing     Sensation     Hip   Left Hip   Intact: light touch    Right Hip   Intact: light touch    Active Range of Motion   Left Hip   Flexion: 80 degrees   Abduction: 20 degrees   External rotation (90/90): 15 degrees   Internal rotation (90/90): 5 degrees     Right Hip   Normal active range of motion    Passive Range of Motion   Left Hip   Flexion: 85 degrees   Abduction: 24 degrees   External rotation (90/90): 20 degrees   Internal rotation (90/90): 10 degrees     Strength/Myotome Testing     Left Hip   Planes of Motion   Flexion: 3-  Abduction: 3  Adduction: 4  External rotation: 3+  Internal rotation: 3+    Right Hip   Normal muscle strength    General Comments:      Hip Comments   GAIT - w/ RW -   Step to stride on left  Slowed,  Good heel- toe, good alignment  TUG - NO ad  12 65 seconds    Today w/ RW - 30 10 seconds                Precautions: MAGDALENA  6-3-2021 - posterior approach      Manuals            FOTO  db                                                  Neuro Re-Ed             gs qs 3s x 20 3s x 20           Add sets                                                                               Ther Ex             bike  5'           Heel slides  20x 20x            Supine abd 20x 10x AA            laq 3s x 20 cramp           slr  AA x 10                         Stand HR 20x           Stand abd, hs curls   20x ea            Stand straight leg flexion  20x                                                   Ther Activity                                       Gait Training                                       Modalities             CP left hip seated   10'

## 2021-06-09 ENCOUNTER — OFFICE VISIT (OUTPATIENT)
Dept: PHYSICAL THERAPY | Facility: CLINIC | Age: 71
End: 2021-06-09
Payer: COMMERCIAL

## 2021-06-09 DIAGNOSIS — M16.12 ARTHRITIS OF LEFT HIP: Primary | ICD-10-CM

## 2021-06-09 PROCEDURE — 97110 THERAPEUTIC EXERCISES: CPT | Performed by: PHYSICAL THERAPIST

## 2021-06-09 PROCEDURE — 97112 NEUROMUSCULAR REEDUCATION: CPT | Performed by: PHYSICAL THERAPIST

## 2021-06-09 NOTE — PROGRESS NOTES
Daily Note     Today's date: 2021  Patient name: Nadine Oh  :   MRN: [id number]  Referring provider: Gali Saavedra MD  Dx:   Encounter Diagnosis     ICD-10-CM    1  Arthritis of left hip  M16 12                   Subjective: "I am just so stiff, I wish the swelling would go down so I could move easier"  She reports fatigue post session, but pleased that she could do a little more today    Objective: See treatment diary below      Assessment: Tolerated treatment well  Patient demonstrated fatigue post treatment and would benefit from continued PT      Plan: continue PT  - progress as patients tolerance allows        Precautions: MAGDALENA  6-3-2021 - posterior approach      Manuals           FOTO  db                                                  Neuro Re-Ed             gs qs 3s x 20 3s x 20 3s x 20          Add sets    3s x 20                       HL TA Marching    2s x 20 alt           HL BKFO                                       Ther Ex             bike  5' 10'          Heel slides  20x 20x  20x           Supine abd 20x 10x AA  aa 10x 2          laq 3s x 20 cramp           slr  AA x 10  A-aa 10 x 2           saq   3s x 20          hss w/ strap             Seated marches   20x                        Stand HR  20x 20x bilat          Stand abd, hs curls   20x ea  20x bilat           Stand straight leg flexion  20x            Stand marches   20x bilat                                    Ther Activity                                       Gait Training                                       Modalities             CP left hip seated   10'  10'

## 2021-06-11 ENCOUNTER — APPOINTMENT (OUTPATIENT)
Dept: RADIOLOGY | Facility: MEDICAL CENTER | Age: 71
End: 2021-06-11
Payer: COMMERCIAL

## 2021-06-11 ENCOUNTER — OFFICE VISIT (OUTPATIENT)
Dept: OBGYN CLINIC | Facility: MEDICAL CENTER | Age: 71
End: 2021-06-11

## 2021-06-11 VITALS
DIASTOLIC BLOOD PRESSURE: 66 MMHG | HEIGHT: 62 IN | SYSTOLIC BLOOD PRESSURE: 113 MMHG | HEART RATE: 90 BPM | WEIGHT: 122 LBS | BODY MASS INDEX: 22.45 KG/M2

## 2021-06-11 DIAGNOSIS — M16.12 ARTHRITIS OF LEFT HIP: ICD-10-CM

## 2021-06-11 DIAGNOSIS — M16.12 ARTHRITIS OF LEFT HIP: Primary | ICD-10-CM

## 2021-06-11 PROCEDURE — 73502 X-RAY EXAM HIP UNI 2-3 VIEWS: CPT

## 2021-06-11 PROCEDURE — 99024 POSTOP FOLLOW-UP VISIT: CPT | Performed by: ORTHOPAEDIC SURGERY

## 2021-06-11 NOTE — PROGRESS NOTES
Assessment:  1  Arthritis of left hip  XR hip/pelv 2-3 vws left if performed       Plan:  The patient is doing well and should continue daily Lovenox, pain medications as needed and current physical therapy regimen  The patient should follow up in one week for staple removal         To do next visit:  Return in about 1 week (around 6/18/2021)  The above stated was discussed in layman's terms and the patient expressed understanding  All questions were answered to the patient's satisfaction  Scribe Attestation    I,:  Dominic Fuad am acting as a scribe while in the presence of the attending physician :       I,:  Tommy Cuenca MD personally performed the services described in this documentation    as scribed in my presence :             Subjective:   Sarah Dunlap is a 69 y o  female who presents one week s/p left MAGDALENA, 6/3/2021  The patient is doing well  Today she complains of generalized left pain  She does participate in physical therapy  She does use Lovenox daily  She does use Tyleol for pain control  She denies fever, chills or shortness of breath          Review of systems negative unless otherwise specified in HPI    Past Medical History:   Diagnosis Date    Breast cancer ([facility] [address] ) 03/01/1993    Hematuria     LAST ASSESSED [id number]       Past Surgical History:   Procedure Laterality Date    BREAST LUMPECTOMY Right 04/01/1993    COLONOSCOPY      FL INJECTION LEFT HIP (NON ARTHROGRAM)  1/19/2021    LYMPH NODE BIOPSY      MOHS SURGERY      MA TOTAL HIP ARTHROPLASTY Left 6/3/2021    Procedure: ARTHROPLASTY HIP TOTAL;  Surgeon: Tommy Cuenca MD;  [facility];  Service: Orthopedics       Family History   Problem Relation Age of Onset    Breast cancer Mother 79    No Known Problems Sister     No Known Problems Daughter     No Known Problems Sister     No Known Problems Sister     No Known Problems Sister     No Known Problems Daughter        Social History     Occupational History    Not on file   Tobacco Use    Smoking status: Never Smoker    Smokeless tobacco: Never Used   Substance and Sexual Activity    Alcohol use: Yes     Frequency: 4 or more times a week     Drinks per session: 1 or 2     Binge frequency: Never    Drug use: No    Sexual activity: Not Currently         Current Outpatient Medications:     acetaminophen (Acetaminophen 8 Hour) 650 mg CR tablet, Take 650 mg by mouth every 8 (eight) hours as needed for mild pain, Disp: , Rfl:     Calcium 500 MG tablet, Take by mouth, Disp: , Rfl:     Progesterone Micronized 10 % CREA, Place on the skin daily , Disp: , Rfl:     rosuvastatin (CRESTOR) 10 MG tablet, TAKE 1 TABLET BY MOUTH EVERY DAY, Disp: 90 tablet, Rfl: 1    valsartan (DIOVAN) 80 mg tablet, Take 1 tablet (80 mg total) by mouth daily, Disp: 30 tablet, Rfl: 5    docusate sodium (COLACE) 100 mg capsule, Take 1 capsule (100 mg total) by mouth 2 (two) times a day, Disp: 10 capsule, Rfl: 0    enoxaparin (LOVENOX) 40 mg/0 4 mL, INJECT 0 4 ML (40 MG TOTAL) UNDER THE SKIN DAILY (Patient not taking: Reported on 5/24/2021), Disp: 11 2 Syringe, Rfl: 0    methocarbamol (ROBAXIN) 500 mg tablet, Take 1 tablet (500 mg total) by mouth 4 (four) times a day for 10 days As needed for muscle spasms may cause drowsiness  , Disp: 40 tablet, Rfl: 0    oxyCODONE (ROXICODONE) 5 mg immediate release tablet, 1 pill po Q4 Hrs prn, Disp: 30 tablet, Rfl: 0    No Known Allergies         Vitals:    06/11/21 1409   BP: 113/66   Pulse: 90       Objective:  Physical exam  · General: Awake, Alert, Oriented  · Eyes: Pupils equal, round and reactive to light  · Heart: regular rate and rhythm  · Lungs: No audible wheezing  · Abdomen: soft                    Ortho Exam  Left hip:  Posterior incision clean dry and intact  Staples well approximated   Appropriate warmth and swelling  Good arc of motion with no pain  Patient sits comfortably in chair with hip flexed at 90 degrees  Patient stands from seated position without assistance  Calf compartments soft and supple  Sensation intact  Toes are warm sensate and mobile      Diagnostics, reviewed and taken today if performed as documented: The attending physician has personally reviewed the pertinent films in PACS and interpretation is as follows:  Left hip x-ray:  Well aligned prosthesis with no acute changes  Procedures, if performed today:    Procedures    None performed      Portions of the record may have been created with voice recognition software  Occasional wrong word or "sound a like" substitutions may have occurred due to the inherent limitations of voice recognition software  Read the chart carefully and recognize, using context, where substitutions have occurred

## 2021-06-14 ENCOUNTER — OFFICE VISIT (OUTPATIENT)
Dept: PHYSICAL THERAPY | Facility: CLINIC | Age: 71
End: 2021-06-14
Payer: COMMERCIAL

## 2021-06-14 DIAGNOSIS — M16.12 ARTHRITIS OF LEFT HIP: Primary | ICD-10-CM

## 2021-06-14 PROCEDURE — 97112 NEUROMUSCULAR REEDUCATION: CPT | Performed by: PHYSICAL THERAPIST

## 2021-06-14 PROCEDURE — 97110 THERAPEUTIC EXERCISES: CPT | Performed by: PHYSICAL THERAPIST

## 2021-06-14 NOTE — PROGRESS NOTES
Daily Note     Today's date: 2021  Patient name: Pancho Antunez  :   MRN: [id number]  Referring provider: Kalani Ji MD  Dx:   Encounter Diagnosis     ICD-10-CM    1  Arthritis of left hip  M16 12                   Subjective: Patient reports that she is feeling better " the swelling is much less now"   She notes that she is able to do more around the home  Objective: See treatment diary below      Assessment: Tolerated treatment well  Patient demonstrated fatigue post treatment and would benefit from continued PT      Plan: continue PT  - progress as patients tolerance allows        Precautions: MAGDALENA  6-3-2021 - posterior approach      Manuals          FOTO  db                                                  Neuro Re-Ed             gs qs 3s x 20 3s x 20 3s x 20 3s x 20         Add sets    3s x 20 3s x 20                      HL TA Marching    2s x 20 alt  2s x 20          HL BKFO    rtb x 20 3s                                    Ther Ex             bike  5' 10' 10'          Heel slides  20x 20x  20x  hep         Supine abd 20x 10x AA  aa 10x 2 AA  10x  2          laq 3s x 20 cramp  20x         slr  AA x 10  A-aa 10 x 2  AA 10x 2         saq   3s x 20 3s x 20         hss w/ strap    20s x 3         Seated marches   20x  20x                      Stand HR  20x 20x bilat 20x         Stand abd, hs curls   20x ea  20x bilat  20x bilat         Stand straight leg flexion  20x   20x L         Stand marches   20x bilat 20x          Step ups  F/ l     6" x 20 ea         Side step / fwd/ retro @ bar    5x ea                                                              Ther Activity                                       Gait Training                                       Modalities             CP left hip seated   10'  10'  def

## 2021-06-18 ENCOUNTER — OFFICE VISIT (OUTPATIENT)
Dept: OBGYN CLINIC | Facility: MEDICAL CENTER | Age: 71
End: 2021-06-18

## 2021-06-18 ENCOUNTER — OFFICE VISIT (OUTPATIENT)
Dept: PHYSICAL THERAPY | Facility: CLINIC | Age: 71
End: 2021-06-18
Payer: COMMERCIAL

## 2021-06-18 VITALS
WEIGHT: 122 LBS | DIASTOLIC BLOOD PRESSURE: 79 MMHG | HEART RATE: 82 BPM | SYSTOLIC BLOOD PRESSURE: 148 MMHG | BODY MASS INDEX: 22.45 KG/M2 | HEIGHT: 62 IN

## 2021-06-18 DIAGNOSIS — Z96.642 AFTERCARE FOLLOWING LEFT HIP JOINT REPLACEMENT SURGERY: Primary | ICD-10-CM

## 2021-06-18 DIAGNOSIS — Z47.1 AFTERCARE FOLLOWING LEFT HIP JOINT REPLACEMENT SURGERY: Primary | ICD-10-CM

## 2021-06-18 DIAGNOSIS — M16.12 ARTHRITIS OF LEFT HIP: Primary | ICD-10-CM

## 2021-06-18 PROCEDURE — 99024 POSTOP FOLLOW-UP VISIT: CPT | Performed by: ORTHOPAEDIC SURGERY

## 2021-06-18 PROCEDURE — 3008F BODY MASS INDEX DOCD: CPT | Performed by: INTERNAL MEDICINE

## 2021-06-18 PROCEDURE — 97110 THERAPEUTIC EXERCISES: CPT

## 2021-06-18 PROCEDURE — 97112 NEUROMUSCULAR REEDUCATION: CPT

## 2021-06-18 NOTE — PROGRESS NOTES
Daily Note     Today's date: 2021  Patient name: Rosalinda Sandhoff  :   MRN: [id number]  Referring provider: Maida Power MD  Dx:   Encounter Diagnosis     ICD-10-CM    1  Arthritis of left hip  M16 12                   Subjective: Upon presentation, patient denied pain, although she did report muscular fatigue with exercise and ADL performance  Objective: See treatment diary below      Assessment: Tolerated treatment with good understanding, pacing and technique of exercise/HEP    Patient continues to demonstrate slight hip IR with LAQ and ambulation  Patient demonstrated fatigue post treatment and would benefit from continued PT      Plan: Continue per plan of care  Progress treatment as tolerated         Precautions: MAGDALENA  6-3-2021 - posterior approach      Manuals         FOTO  db   LA                                               Neuro Re-Ed             gs qs 3s x 20 3s x 20 3s x 20 3s x 20 :03  20x        Add sets    3s x 20 3s x 20 :03  20x                     HL TA Marching    2s x 20 alt  2s x 20  :02  20x        HL BKFO    rtb x 20 3s  RTB  :03  20x                                  Ther Ex             bike  5' 10' 10'  10'        Heel slides  20x 20x  20x  hep         Supine abd 20x 10x AA  aa 10x 2 AA  10x  2  AA  10x2        laq 3s x 20 cramp  20x 20x        slr  AA x 10  A-aa 10 x 2  AA 10x 2 AA  10x2        saq   3s x 20 3s x 20 B  :03  20x each        hss w/ strap    20s x 3 :20  3x        Seated marches   20x  20x 20x                     Stand HR  20x 20x bilat 20x 20x        Stand abd, hs curls   20x ea  20x bilat  20x bilat B  20x        Stand straight leg flexion  20x   20x L L  20x        Stand marches   20x bilat 20x  20x        Step ups  F/ l     6" x 20 ea 6"  20x each        Side step / fwd/ retro @ bar    5x ea  5x each                                                            Ther Activity                                       Gait Training Modalities             CP left hip seated   10'  10'  def 10'

## 2021-06-18 NOTE — PROGRESS NOTES
Assessment:   Diagnosis ICD-10-CM Associated Orders   1  Aftercare following left hip joint replacement surgery  Z47 1     [id number] [id number]        Plan:    Second postoperative visit 2 weeks status post left total hip arthroplasty  Patient overall is doing well  Incision is healed appropriately staples removed successfully  She can get her incision wet, do not submerge, pat dry  Avoid skin cream ointments for the next 2 weeks  Continue physical therapy to maximize recovery  Complete Lovenox as instructed  Walker to cane progression when able to do so  To do next visit:  Return in about 4 weeks (around 7/16/2021) for re-check  The above stated was discussed in layman's terms and the patient expressed understanding  All questions were answered to the patient's satisfaction  Scribe Attestation    I,:  John Longo am acting as a scribe while in the presence of the attending physician :       I,:  Ace Light MD personally performed the services described in this documentation    as scribed in my presence :             Subjective:   Slava Silvestre is a 69 y o  female who presents today for 2nd postoperative visit 2 weeks status post left total hip arthroplasty  Patient presents with use of rolling walker for ambulatory assistance  She has been administering Lovenox for DVT prophylaxis  She is attending outpatient physical therapy progressing well  She denies any significant pain and describes her symptoms of more muscle soreness         Review of systems negative unless otherwise specified in HPI  Review of Systems    Past Medical History:   Diagnosis Date    Breast cancer ([facility] [address] ) 03/01/1993    Hematuria     LAST ASSESSED [id number]       Past Surgical History:   Procedure Laterality Date    BREAST LUMPECTOMY Right 04/01/1993    COLONOSCOPY      FL INJECTION LEFT HIP (NON ARTHROGRAM)  1/19/2021    LYMPH NODE BIOPSY      MOHS SURGERY      AR TOTAL HIP ARTHROPLASTY Left 6/3/2021    Procedure: ARTHROPLASTY HIP TOTAL;  Surgeon: Ibrahima Gant MD;  Location: [facility];  Service: Orthopedics       Family History   Problem Relation Age of Onset    Breast cancer Mother 79    No Known Problems Sister     No Known Problems Daughter     No Known Problems Sister     No Known Problems Sister     No Known Problems Sister     No Known Problems Daughter        Social History     Occupational History    Not on file   Tobacco Use    Smoking status: Never Smoker    Smokeless tobacco: Never Used   Vaping Use    Vaping Use: Never used   Substance and Sexual Activity    Alcohol use: Yes    Drug use: No    Sexual activity: Not Currently         Current Outpatient Medications:     acetaminophen (Acetaminophen 8 Hour) 650 mg CR tablet, Take 650 mg by mouth every 8 (eight) hours as needed for mild pain, Disp: , Rfl:     Calcium 500 MG tablet, Take by mouth, Disp: , Rfl:     Progesterone Micronized 10 % CREA, Place on the skin daily , Disp: , Rfl:     rosuvastatin (CRESTOR) 10 MG tablet, TAKE 1 TABLET BY MOUTH EVERY DAY, Disp: 90 tablet, Rfl: 1    valsartan (DIOVAN) 80 mg tablet, Take 1 tablet (80 mg total) by mouth daily, Disp: 30 tablet, Rfl: 5    docusate sodium (COLACE) 100 mg capsule, Take 1 capsule (100 mg total) by mouth 2 (two) times a day, Disp: 10 capsule, Rfl: 0    enoxaparin (LOVENOX) 40 mg/0 4 mL, INJECT 0 4 ML (40 MG TOTAL) UNDER THE SKIN DAILY (Patient not taking: Reported on 5/24/2021), Disp: 11 2 Syringe, Rfl: 0    methocarbamol (ROBAXIN) 500 mg tablet, Take 1 tablet (500 mg total) by mouth 4 (four) times a day for 10 days As needed for muscle spasms may cause drowsiness  , Disp: 40 tablet, Rfl: 0    oxyCODONE (ROXICODONE) 5 mg immediate release tablet, 1 pill po Q4 Hrs prn, Disp: 30 tablet, Rfl: 0    No Known Allergies         Vitals:    06/18/21 1515   BP: 148/79   Pulse: 82       Objective:                    Left Hip Exam     Other   Erythema: absent  Sensation: normal    Comments: Healed posterior lateral incision with staples in place which removed successfully  Minimal swelling  Moderate diffuse resolving ecchymosis  Calf and thigh are soft nontender no signs DVT  Appropriate amount of warmth  No drainage  No signs of infection  Diagnostics, reviewed and taken today if performed as documented:    None performed          Procedures, if performed today:    Procedures    None performed      Portions of the record may have been created with voice recognition software  Occasional wrong word or "sound a like" substitutions may have occurred due to the inherent limitations of voice recognition software  Read the chart carefully and recognize, using context, where substitutions have occurred

## 2021-06-23 ENCOUNTER — OFFICE VISIT (OUTPATIENT)
Dept: PHYSICAL THERAPY | Facility: CLINIC | Age: 71
End: 2021-06-23
Payer: COMMERCIAL

## 2021-06-23 DIAGNOSIS — M16.12 ARTHRITIS OF LEFT HIP: Primary | ICD-10-CM

## 2021-06-23 PROCEDURE — 97110 THERAPEUTIC EXERCISES: CPT | Performed by: PHYSICAL THERAPIST

## 2021-06-23 PROCEDURE — 97112 NEUROMUSCULAR REEDUCATION: CPT | Performed by: PHYSICAL THERAPIST

## 2021-06-23 PROCEDURE — 97116 GAIT TRAINING THERAPY: CPT | Performed by: PHYSICAL THERAPIST

## 2021-06-23 NOTE — PROGRESS NOTES
Daily Note     Today's date: 2021  Patient name: Latasha Eid  : 3/25/2043  MRN: [id number]  Referring provider: Aric Griffin MD  Dx:   Encounter Diagnosis     ICD-10-CM    1  Arthritis of left hip  M16 12                   Subjective: Patient reports that she continues to ambulate more and more inside the house without AD -  Practiced SPC in clinic today and she will acquire one she reports  Notes that she would like to try walking her driveway with her walker ( it is on an incline ) - I reported that slowly with walker would be acceptable at this time  Objective: See treatment diary below      Assessment: Tolerated treatment with good understanding, pacing and technique of exercise/HEP    Patient continues to demonstrate slight hip IR with LAQ and ambulation  Patient demonstrated fatigue post treatment and would benefit from continued PT  Cueing helps to increase awareness and self correction of right LE IR  Plan: Continue per plan of care  Progress treatment as tolerated  Monitor response to outdoor walking and use of SPC        Precautions: MAGDALENA  6-3-2021 - posterior approach      Manuals        FOTO  db   LA                                               Neuro Re-Ed             gs qs 3s x 20 3s x 20 3s x 20 3s x 20 :03  20x 3s x 20       Add sets    3s x 20 3s x 20 :03  20x 3s x 20                    HL TA Marching    2s x 20 alt  2s x 20  :02  20x 1# L x 20 2s       HL BKFO    rtb x 20 3s  RTB  :03  20x gtb x 30 3s                                  Ther Ex             bike  5' 10' 10'  10' 10'                     Supine abd 20x 10x AA  aa 10x 2 AA  10x  2  AA  10x2 1#  A  X 20       laq 3s x 20 cramp  20x 20x 1# x 20        slr  AA x 10  A-aa 10 x 2  AA 10x 2 AA  10x2 A  X 20        saq   3s x 20 3s x 20 B  :03  20x each 1# L  X 20        hss w/ strap    20s x 3 :20  3x 20s x 3       Seated marches   20x  20x 20x 1# x 20                    Stand HR  20x 20x bilat 20x 20x 20x       Stand abd, hs curls   20x ea  20x bilat  20x bilat B  20x 1# L x 20 bilat       Stand straight leg flexion  20x   20x L L  20x 1# L x 20 bilat       Stand marches   20x bilat 20x  20x 1# L x 20 bilat       Step ups  F/ l     6" x 20 ea 6"  20x each 6"  1# L x 20 ea       Side step / fwd/ retro @ bar    5x ea  5x each 30' x 2 eA       March hand/ opp knee      30' x 2                                               Ther Activity                                       Gait Training                                       Modalities             CP left hip seated   10'  10'  def 10' def

## 2021-06-24 ENCOUNTER — APPOINTMENT (OUTPATIENT)
Dept: PHYSICAL THERAPY | Facility: CLINIC | Age: 71
End: 2021-06-24
Payer: COMMERCIAL

## 2021-06-25 ENCOUNTER — OFFICE VISIT (OUTPATIENT)
Dept: PHYSICAL THERAPY | Facility: CLINIC | Age: 71
End: 2021-06-25
Payer: COMMERCIAL

## 2021-06-25 DIAGNOSIS — M16.12 ARTHRITIS OF LEFT HIP: Primary | ICD-10-CM

## 2021-06-25 PROCEDURE — 97112 NEUROMUSCULAR REEDUCATION: CPT

## 2021-06-25 PROCEDURE — 97110 THERAPEUTIC EXERCISES: CPT

## 2021-06-25 PROCEDURE — 97530 THERAPEUTIC ACTIVITIES: CPT

## 2021-06-25 NOTE — PROGRESS NOTES
Daily Note     Today's date: 2021  Patient name: Nadine Oh  :   MRN: [id number]  Referring provider: Gali Saavedra MD  Dx: No diagnosis found  Subjective: Upon presentation patient noted general malaise, stating she went food shopping for the first time yesterday, which "wiped me out"  Patient continues report her chief complaint is "tightness" of left quadriceps and inability to sleep t/o the evening  Patient noted improved ease with stair climbing/descending  Objective: See treatment diary below      Assessment: Tolerated treatment well  Patient demonstrated fatigue post treatment, exhibited good technique with therapeutic exercises and would benefit from continued PT      Plan: Continue per plan of care  Progress treatment as tolerated         Precautions: MAGDALENA  6-3-2021 - posterior approach      Manuals       FOTO  db   LA                                               Neuro Re-Ed             gs qs 3s x 20 3s x 20 3s x 20 3s x 20 :03  20x 3s x 20 :03  20x      Add sets    3s x 20 3s x 20 :03  20x 3s x 20 :03  20x                   HL TA Marching    2s x 20 alt  2s x 20  :02  20x 1# L x 20 2s L  1#  20x      HL BKFO    rtb x 20 3s  RTB  :03  20x gtb x 30 3s  GTB  30x                                Ther Ex             bike  5' 10' 10'  10' 10'  10'                   Supine abd 20x 10x AA  aa 10x 2 AA  10x  2  AA  10x2 1#  A  X 20 L  1#  10x2      laq 3s x 20 cramp  20x 20x 1# x 20  L  1#  20x      slr  AA x 10  A-aa 10 x 2  AA 10x 2 AA  10x2 A  X 20  L  20x      saq   3s x 20 3s x 20 B  :03  20x each 1# L  X 20  L  1#  20x      hss w/ strap    20s x 3 :20  3x 20s x 3 :20  3x      Seated marches   20x  20x 20x 1# x 20 L  1#  20x                   Stand HR  20x 20x bilat 20x 20x 20x 20x      Stand abd, hs curls   20x ea  20x bilat  20x bilat B  20x 1# L x 20 bilat L  1#  20x bilaterally      Stand straight leg flexion  20x   20x L L  20x 1# L x 20 bilat L  1#  20x bilaterally      Stand marches   20x bilat 20x  20x 1# L x 20 bilat L 1#  20x bilaterally      Step ups  F/ l     6" x 20 ea 6"  20x each 6"  1# L x 20 ea 6"  L  1#  20x each      Side step / fwd/ retro @ bar    5x ea  5x each 30' x 2 eA 30'x2 each      March hand/ opp knee      30' x 2  30'x2                                             Ther Activity                                       Gait Training                                       Modalities             CP left hip seated   10'  10'  def 10' def def

## 2021-06-28 ENCOUNTER — OFFICE VISIT (OUTPATIENT)
Dept: PHYSICAL THERAPY | Facility: CLINIC | Age: 71
End: 2021-06-28
Payer: COMMERCIAL

## 2021-06-28 DIAGNOSIS — M16.12 ARTHRITIS OF LEFT HIP: Primary | ICD-10-CM

## 2021-06-28 PROCEDURE — 97112 NEUROMUSCULAR REEDUCATION: CPT

## 2021-06-28 PROCEDURE — 97530 THERAPEUTIC ACTIVITIES: CPT

## 2021-06-28 PROCEDURE — 97110 THERAPEUTIC EXERCISES: CPT

## 2021-06-28 NOTE — PROGRESS NOTES
Daily Note     Today's date: 2021  Patient name: Oneida Bumpers  : [id number]  MRN: [id number]  Referring provider: Annel Lo MD  Dx:   Encounter Diagnosis     ICD-10-CM    1  Arthritis of left hip  M16 12                   Subjective: Pt is doing well today with no complaints of pain  Notes that she has be seeing improvements in strength each week since coming to therapy  Objective: See treatment diary below      Assessment: Tolerated treatment well  She continues to do well with current program, being mostly challenged with the seated marches  Weakness still present in L hip flexors and quads  Patient demonstrated fatigue post treatment, exhibited good technique with therapeutic exercises and would benefit from continued PT      Plan: Continue per plan of care  Progress treatment as tolerated         Precautions: MAGDALENA  6-3-2021 - posterior approach      Manuals      FOTO  db   LA                                               Neuro Re-Ed             gs qs 3s x 20 3s x 20 3s x 20 3s x 20 :03  20x 3s x 20 :03  20x :03 20x     Add sets    3s x 20 3s x 20 :03  20x 3s x 20 :03  20x :03 20x                  HL TA Marching    2s x 20 alt  2s x 20  :02  20x 1# L x 20 2s L  1#  20x L 1# 20x     HL BKFO    rtb x 20 3s  RTB  :03  20x gtb x 30 3s  GTB  30x GTB 30x                               Ther Ex             bike  5' 10' 10'  10' 10'  10' 10'                  Supine abd 20x 10x AA  aa 10x 2 AA  10x  2  AA  10x2 1#  A  X 20 L  1#  10x2 L 1# 10x2     laq 3s x 20 cramp  20x 20x 1# x 20  L  1#  20x L  1#  20x     slr  AA x 10  A-aa 10 x 2  AA 10x 2 AA  10x2 A  X 20  L  20x L 20x     saq   3s x 20 3s x 20 B  :03  20x each 1# L  X 20  L  1#  20x L 1# 20x     hss w/ strap    20s x 3 :20  3x 20s x 3 :20  3x :20 3x     Seated marches   20x  20x 20x 1# x 20 L  1#  20x L  1#  20x                  Stand HR  20x 20x bilat 20x 20x 20x 20x 20x     Stand abd, hs curls   20x ea  20x bilat  20x bilat B  20x 1# L x 20 bilat L  1#  20x bilaterally L  1#  20x bilaterally     Stand straight leg flexion  20x   20x L L  20x 1# L x 20 bilat L  1#  20x bilaterally L  1#  20x bilaterally     Stand marches   20x bilat 20x  20x 1# L x 20 bilat L 1#  20x bilaterally L 1#  20x bilaterally     Step ups  F/ l     6" x 20 ea 6"  20x each 6"  1# L x 20 ea 6"  L  1#  20x each 6"  L  1#  20x each     Side step / fwd/ retro @ bar    5x ea  5x each 30' x 2 eA 30'x2 each 30'x2 ea     March hand/ opp knee      30' x 2  30'x2 30'x2                                            Ther Activity                                       Gait Training                                       Modalities             CP left hip seated   10'  10'  def 10' def def 10'

## 2021-07-01 ENCOUNTER — OFFICE VISIT (OUTPATIENT)
Dept: PHYSICAL THERAPY | Facility: CLINIC | Age: 71
End: 2021-07-01
Payer: COMMERCIAL

## 2021-07-01 DIAGNOSIS — M16.12 ARTHRITIS OF LEFT HIP: Primary | ICD-10-CM

## 2021-07-01 PROCEDURE — 97112 NEUROMUSCULAR REEDUCATION: CPT

## 2021-07-01 PROCEDURE — 97530 THERAPEUTIC ACTIVITIES: CPT

## 2021-07-01 PROCEDURE — 97110 THERAPEUTIC EXERCISES: CPT

## 2021-07-01 NOTE — PROGRESS NOTES
Daily Note     Today's date: 2021  Patient name: Mercedez Alan  :   MRN: [id number]  Referring provider: Yanet Haley MD  Dx:   Encounter Diagnosis     ICD-10-CM    1  Arthritis of left hip  M16 12                   Subjective: SPR=0/10  Patient stated she feels she is about "60% back to normal"  Patient expressed concern over getting up from floor, stepping out of in ground pool with no railing and starting a walking routine again  Objective: See treatment diary below      Assessment:  Addressed all of patients concerns/questions and encouraged safety at all times  Tolerated treatment and progressions of program very well without limitations  Patient demonstrated fatigue post treatment, exhibited good technique with therapeutic exercises and would benefit from continued PT      Plan: Continue per plan of care  Progress treatment as tolerated         Precautions: MAGDALENA  6-3-2021 - posterior approach      Manuals     FOTO  db   LA                                               Neuro Re-Ed             gs qs 3s x 20 3s x 20 3s x 20 3s x 20 :03  20x 3s x 20 :03  20x :03 20x :03  20x    Add sets    3s x 20 3s x 20 :03  20x 3s x 20 :03  20x :03 20x :03  20x                  HL TA Marching    2s x 20 alt  2s x 20  :02  20x 1# L x 20 2s L  1#  20x L 1# 20x L  1#  20x    HL BKFO    rtb x 20 3s  RTB  :03  20x gtb x 30 3s  GTB  30x GTB 30x GTB  30x                              Ther Ex             bike  5' 10' 10'  10' 10'  10' 10' 10'                 Supine abd 20x 10x AA  aa 10x 2 AA  10x  2  AA  10x2 1#  A  X 20 L  1#  10x2 L 1# 10x2 L  1#  10x2    laq 3s x 20 cramp  20x 20x 1# x 20  L  1#  20x L  1#  20x L  1#  20x    slr  AA x 10  A-aa 10 x 2  AA 10x 2 AA  10x2 A  X 20  L  20x L 20x L  20x    saq   3s x 20 3s x 20 B  :03  20x each 1# L  X 20  L  1#  20x L 1# 20x L  1#  20x    hss w/ strap    20s x 3 :20  3x 20s x 3 :20  3x :20 3x :20  3x    Seated brian 20x  20x 20x 1# x 20 L  1#  20x L  1#  20x L  1#  20x                 Stand HR  20x 20x bilat 20x 20x 20x 20x 20x 20x    Stand abd, hs curls   20x ea  20x bilat  20x bilat B  20x 1# L x 20 bilat L  1#  20x bilaterally L  1#  20x bilaterally L  1#  20x     Stand straight leg flexion  20x   20x L L  20x 1# L x 20 bilat L  1#  20x bilaterally L  1#  20x bilaterally B  L1#  20x each  On foam    Stand marches   20x bilat 20x  20x 1# L x 20 bilat L 1#  20x bilaterally L 1#  20x bilaterally L  1#  0n foam  B 20x    Step ups  F/ l     6" x 20 ea 6"  20x each 6"  1# L x 20 ea 6"  L  1#  20x each 6"  L  1#  20x each 8"  L 1#  20x each    Side step / fwd/ retro @ bar    5x ea  5x each 30' x 2 eA 30'x2 each 30'x2 ea 30'x2  each    March hand/ opp knee      30' x 2  30'x2 30'x2 30'x2    tandem         30'x2                              Ther Activity                                       Gait Training                                       Modalities             CP left hip seated   10'  10'  def 10' def def 10'

## 2021-07-06 ENCOUNTER — TELEPHONE (OUTPATIENT)
Dept: OBGYN CLINIC | Facility: HOSPITAL | Age: 71
End: 2021-07-06

## 2021-07-06 DIAGNOSIS — Z47.1 AFTERCARE FOLLOWING JOINT REPLACEMENT SURGERY, UNSPECIFIED JOINT: Primary | ICD-10-CM

## 2021-07-06 RX ORDER — CEPHALEXIN 500 MG/1
CAPSULE ORAL
Qty: 12 CAPSULE | Refills: 3 | Status: SHIPPED | OUTPATIENT
Start: 2021-07-06 | End: 2021-07-13

## 2021-07-06 NOTE — TELEPHONE ENCOUNTER
[facility] 06 03 Dr Linwodo Treviño    Patient dentist called asking if patient is clear for routine cleaning and possibly dental procedure   Dental office asked for guidelines to be discussed        # [id number] [facility] [place]

## 2021-07-06 NOTE — TELEPHONE ENCOUNTER
Patient had surgery with Dr Roshni Purcell on 6/3  One of her doctors called and left a voicemail to ask about guidelines for the patient  This is all the info that was left   Call back #[phone]

## 2021-07-06 NOTE — TELEPHONE ENCOUNTER
Contacted dentist  Pt has broken tooth  They are requesting dental prophylaxis   Made staff aware I will send to surgeon, and contact them once completed as they are trying to get patient in for eval tomorrow AM

## 2021-07-07 ENCOUNTER — OFFICE VISIT (OUTPATIENT)
Dept: PHYSICAL THERAPY | Facility: CLINIC | Age: 71
End: 2021-07-07
Payer: COMMERCIAL

## 2021-07-07 DIAGNOSIS — M16.12 ARTHRITIS OF LEFT HIP: Primary | ICD-10-CM

## 2021-07-07 PROCEDURE — 97112 NEUROMUSCULAR REEDUCATION: CPT

## 2021-07-07 PROCEDURE — 97110 THERAPEUTIC EXERCISES: CPT

## 2021-07-07 NOTE — PROGRESS NOTES
Daily Note     Today's date: 2021  Patient name: Aquiles Torres  : [birth year]  MRN: [id number]  Referring provider: Yandy Forrest MD  Dx:   Encounter Diagnosis     ICD-10-CM    1  Arthritis of left hip  M16 12                   Subjective: Patient offers no new complaints at this time  Objective: See treatment diary below      Assessment: Tolerated treatment well  Patient would benefit from continued PT      Plan: Continue per plan of care        Precautions: MAGDALENA  6-3-2021 - posterior approach      Manuals    FOTO  db   LA                                               Neuro Re-Ed             gs qs 3s x 20 3s x 20 3s x 20 3s x 20 :03  20x 3s x 20 :03  20x :03 20x :03  20x :03 20x   Add sets    3s x 20 3s x 20 :03  20x 3s x 20 :03  20x :03 20x :03  20x  ;03 20x                HL TA Marching    2s x 20 alt  2s x 20  :02  20x 1# L x 20 2s L  1#  20x L 1# 20x L  1#  20x L 1# 20x   HL BKFO    rtb x 20 3s  RTB  :03  20x gtb x 30 3s  GTB  30x GTB 30x GTB  30x GTB x30                             Ther Ex             bike  5' 10' 10'  10' 10'  10' 10' 10' 10'                Supine abd 20x 10x AA  aa 10x 2 AA  10x  2  AA  10x2 1#  A  X 20 L  1#  10x2 L 1# 10x2 L  1#  10x2 L 1# x20   laq 3s x 20 cramp  20x 20x 1# x 20  L  1#  20x L  1#  20x L  1#  20x L1# x20   slr  AA x 10  A-aa 10 x 2  AA 10x 2 AA  10x2 A  X 20  L  20x L 20x L  20x L 1# x20   saq   3s x 20 3s x 20 B  :03  20x each 1# L  X 20  L  1#  20x L 1# 20x L  1#  20x l 1# 20x   hss w/ strap    20s x 3 :20  3x 20s x 3 :20  3x :20 3x :20  3x :20 3x   Seated marches   20x  20x 20x 1# x 20 L  1#  20x L  1#  20x L  1#  20x L 1# 20x                Stand HR  20x 20x bilat 20x 20x 20x 20x 20x 20x 20x   Stand abd, hs curls   20x ea  20x bilat  20x bilat B  20x 1# L x 20 bilat L  1#  20x bilaterally L  1#  20x bilaterally L  1#  20x  L 1# 20x   Stand straight leg flexion  20x   20x L L  20x 1# L x 20 bilat L  1#  20x bilaterally L  1#  20x bilaterally B  L1#  20x each  On foam B L1# 20 ea on foam   Stand marches   20x bilat 20x  20x 1# L x 20 bilat L 1#  20x bilaterally L 1#  20x bilaterally L  1#  0n foam  B 20x L 1# on foam B 20x   Step ups  F/ l     6" x 20 ea 6"  20x each 6"  1# L x 20 ea 6"  L  1#  20x each 6"  L  1#  20x each 8"  L 1#  20x each 8" L 1# 20x ea   Side step / fwd/ retro @ bar    5x ea  5x each 30' x 2 eA 30'x2 each 30'x2 ea 30'x2  each 30'x2   March hand/ opp knee      30' x 2  30'x2 30'x2 30'x2 30'x2   tandem         30'x2 30'x2                             Ther Activity                                       Gait Training                                       Modalities             CP left hip seated   10'  10'  def 10' def def 10'

## 2021-07-09 ENCOUNTER — OFFICE VISIT (OUTPATIENT)
Dept: PHYSICAL THERAPY | Facility: CLINIC | Age: 71
End: 2021-07-09
Payer: COMMERCIAL

## 2021-07-09 DIAGNOSIS — M16.12 ARTHRITIS OF LEFT HIP: Primary | ICD-10-CM

## 2021-07-09 PROCEDURE — 97110 THERAPEUTIC EXERCISES: CPT

## 2021-07-09 PROCEDURE — 97530 THERAPEUTIC ACTIVITIES: CPT

## 2021-07-09 PROCEDURE — 97112 NEUROMUSCULAR REEDUCATION: CPT

## 2021-07-09 NOTE — PROGRESS NOTES
Daily Note     Today's date: 2021  Patient name: Milagros Peterson  :   MRN: [id number]  Referring provider: Alcira Sinclair MD  Dx:   Encounter Diagnosis     ICD-10-CM    1  Arthritis of left hip  M16 12                   Subjective: Patient reports attempting a walk today states she made it approx  100 yards before fatigue  Objective: See treatment diary below      Assessment: Pt is making excellent progress, fatigued post tx reports no increased pain during or post tx  Plan: Continue per plan of care        Precautions: MAGDALENA  6-3-2021 - posterior approach      Manuals    FOTO LA                                       Neuro Re-Ed          gs qs :03  20x 3s x 20 :03  20x :03 20x :03  20x :03 20x :03 x20   Add sets  :03  20x 3s x 20 :03  20x :03 20x :03  20x  ;03 20x :03 x20             HL TA Marching  :02  20x 1# L x 20 2s L  1#  20x L 1# 20x L  1#  20x L 1# 20x L 1# x20   HL BKFO RTB  :03  20x gtb x 30 3s  GTB  30x GTB 30x GTB  30x GTB x30 GTB x30                       Ther Ex          bike 10' 10'  10' 10' 10' 10' 10'             Supine abd AA  10x2 1#  A  X 20 L  1#  10x2 L 1# 10x2 L  1#  10x2 L 1# x20 L1# x20   laq 20x 1# x 20  L  1#  20x L  1#  20x L  1#  20x L1# x20 L1# x20   slr AA  10x2 A  X 20  L  20x L 20x L  20x L 1# x20 L1# x20   saq B  :03  20x each 1# L  X 20  L  1#  20x L 1# 20x L  1#  20x l 1# 20x : 1# x20   hss w/ strap :20  3x 20s x 3 :20  3x :20 3x :20  3x :20 3x :20x3   Seated marches 20x 1# x 20 L  1#  20x L  1#  20x L  1#  20x L 1# 20x L 1# 20x             Stand HR 20x 20x 20x 20x 20x 20x    Stand abd, hs curls  B  20x 1# L x 20 bilat L  1#  20x bilaterally L  1#  20x bilaterally L  1#  20x  L 1# 20x L1# 20x   Stand straight leg flexion L  20x 1# L x 20 bilat L  1#  20x bilaterally L  1#  20x bilaterally B  L1#  20x each  On foam B L1# 20 ea on foam B L1# 20ea on foam   Stand marches 20x 1# L x 20 bilat L 1#  20x bilaterally L 1#  20x bilaterally L  1#  0n foam  B 20x L 1# on foam B 20x L1# on foam B 20x   Step ups  F/ l  6"  20x each 6"  1# L x 20 ea 6"  L  1#  20x each 6"  L  1#  20x each 8"  L 1#  20x each 8" L 1# 20x ea 8" L 1# 20x ea   Side step / fwd/ retro @ bar 5x each 30' x 2 eA 30'x2 each 30'x2 ea 30'x2  each 30'x2 30'x2   March hand/ opp knee  30' x 2  30'x2 30'x2 30'x2 30'x2 30'x2   tandem     30'x2 30'x2 30'x2                       Ther Activity                              Gait Training                              Modalities          CP left hip seated  10' def def 10'

## 2021-07-12 ENCOUNTER — OFFICE VISIT (OUTPATIENT)
Dept: PHYSICAL THERAPY | Facility: CLINIC | Age: 71
End: 2021-07-12
Payer: COMMERCIAL

## 2021-07-12 DIAGNOSIS — M16.12 ARTHRITIS OF LEFT HIP: Primary | ICD-10-CM

## 2021-07-12 PROCEDURE — 97110 THERAPEUTIC EXERCISES: CPT | Performed by: PHYSICAL THERAPIST

## 2021-07-12 PROCEDURE — 97530 THERAPEUTIC ACTIVITIES: CPT | Performed by: PHYSICAL THERAPIST

## 2021-07-12 NOTE — PROGRESS NOTES
Daily Note     Today's date: 2021  Patient name: Mike Travis  : [birth year]  MRN: [id number]  Referring provider: Jennifer Farley MD  Dx:   Encounter Diagnosis     ICD-10-CM    1  Arthritis of left hip  M16 12                   Subjective: Patient reports feeling "excellent"   Notes that she was in the pool this weekend and has felt much looser since  Pleased with progressions today  Notes that she would like to get back to her regular exercise regimen slowly         Objective: See treatment diary below      Assessment: Pt is making excellent progress  Changes today as noted -  Increased weight  / increased dynamic tasks as tolerated  Plan: Continue per plan of care  Reassess next session for MD visit this week -   Tentative dc planned        Precautions: MAGDALENA  6-3-2021 - posterior approach      Manuals    FOTO     la                                   Neuro Re-Ed          gs qs dc  :  20x :03 20x :03  20x :03 20x :03 x20   Add sets  dc  :  20x :03 20x :03  20x  ;03 20x :03 x20             HL TA Marching  dc  L  1#  20x L 1# 20x L  1#  20x L 1# 20x L 1# x20   HL BKFO dc  GTB  30x GTB 30x GTB  30x GTB x30 GTB x30                       Ther Ex          bike 10'   10' 10' 10' 10' 10'             Supine abd dc  L  1#  10x2 L 1# 10x2 L  1#  10x2 L 1# x20 L1# x20   laq dc  L  1#  20x L  1#  20x L  1#  20x L1# x20 L1# x20   slr dc  L  20x L 20x L  20x L 1# x20 L1# x20   saq dc  L  1#  20x L 1# 20x L  1#  20x l 1# 20x : 1# x20   hss w/ strap dc  :20  3x :20 3x :20  3x :20 3x :20x3   Seated marches dc  L  1#  20x L  1#  20x L  1#  20x L 1# 20x L 1# 20x             Stand HR 2# x 20 foam   20x 20x 20x 20x    Stand abd, hs curls  2# bilat x 20 foam   L  1#  20x bilaterally L  1#  20x bilaterally L  1#  20x  L 1# 20x L1# 20x   Stand ext  2# x 20 foam bilat          Stand marches 2#  X 20 on foam bilat   L 1#  20x bilaterally L 1#  20x bilaterally L  1#  0n foam  B 20x L 1# on foam B 20x L1# on foam B 20x   Step ups  F/ l  8"  2#  bilat x 20 ea   6"  L  1#  20x each 6"  L  1#  20x each 8"  L 1#  20x each 8" L 1# 20x ea 8" L 1# 20x ea   Side step / fwd/ retro @ bar 30'x 2   30'x2 each 30'x2 ea 30'x2  each 30'x2 30'x2   March hand/ opp knee 30' x 2  30'x2 30'x2 30'x2 30'x2 30'x2   tandem 30'x 2    30'x2 30'x2 30'x2             Tramp toss bball x 20 foam         TG L10 20x 2          Ball toss on foam and off w/ movement opb   SBA                                       Ther Activity                              Gait Training                              Modalities          CP left hip seated    def 10'

## 2021-07-14 ENCOUNTER — EVALUATION (OUTPATIENT)
Dept: PHYSICAL THERAPY | Facility: CLINIC | Age: 71
End: 2021-07-14
Payer: COMMERCIAL

## 2021-07-14 DIAGNOSIS — M16.12 ARTHRITIS OF LEFT HIP: Primary | ICD-10-CM

## 2021-07-14 PROCEDURE — 97530 THERAPEUTIC ACTIVITIES: CPT | Performed by: PHYSICAL THERAPIST

## 2021-07-14 PROCEDURE — 97110 THERAPEUTIC EXERCISES: CPT | Performed by: PHYSICAL THERAPIST

## 2021-07-14 NOTE — PROGRESS NOTES
PT Re-Evaluation  and PT Discharge    Today's date: 2021  Patient name: Katie Dangelo  :   MRN: [id number]  Referring provider: Neha Mueller MD  Dx:   Encounter Diagnosis     ICD-10-CM    1  Arthritis of left hip  M16 12                   Assessment  Assessment details: Patient is doing well overall in PT -   She reports improving functional and recreational abilities  She noted "twinge of discomfort" left hip today " in the joint -  Unsure of reason - but feels may be due to extra yard work that she had performed yesterday    Otherwise - her pain is neglible  She is making nice progress towards original goals  _ she is demonstrating independence and competence with HEP at this time  She feels ready for discharge - please reassess and advise to differences   She will need to continue to strengthen upon dc as well as self correct her left LE during ambulation to avoid excessive IR    Thank you for this referral      Impairments: abnormal gait and impaired physical strength  Understanding of Dx/Px/POC: good  Goals  STG   1  Patient will demonstrate independence and competence with HEP 2 -4 weeks   MET    LTG-   Normalize gait pattern without AD - MET  Improve motor function left LE - MET  Abolish pain left hip - ONGOING     Plan  Plan details:      Thank you for this referral    Planned therapy interventions: home exercise program  Treatment plan discussed with: patient        Subjective Evaluation    Pain  Current pain ratin  At worst pain ratin  Quality: dull ache  Progression: improved    Social Support  Lives in: multiple-level home    Patient Goals  Patient goals for therapy: decreased pain, independence with ADLs/IADLs, return to sport/leisure activities and increased motion  Patient goal: "i want to be able to walk"         Objective     Neurological Testing     Sensation     Hip   Left Hip   Intact: light touch    Right Hip   Intact: light touch    Active Range of Motion   Left Hip Flexion: 95 degrees   Abduction: 27 degrees   External rotation (90/90): 22 degrees   Internal rotation (90/90): 16 degrees     Right Hip   Normal active range of motion    Strength/Myotome Testing     Left Hip   Planes of Motion   Flexion: 4+  Abduction: 4  Adduction: 4+  External rotation: 4  Internal rotation: 4    Right Hip   Normal muscle strength    General Comments:      Hip Comments   GAIT - improved - good jj   Cueing for LE posturing to avoid excessive IR     TUG - NO ad  12 65 seconds  DC = 11:06 seconds                 Precautions: MAGDALENA  6-3-2021 - posterior approach      Manuals 7/12 7/14 6/25 6/28 7/1 7/7 7/9   FOTO     la                                   Neuro Re-Ed          gs qs dc  :03  20x :03 20x :03  20x :03 20x :03 x20   Add sets  dc  :03  20x :03 20x :03  20x  ;03 20x :03 x20             HL TA Marching  dc  L  1#  20x L 1# 20x L  1#  20x L 1# 20x L 1# x20   HL BKFO dc  GTB  30x GTB 30x GTB  30x GTB x30 GTB x30                       Ther Ex          bike 10'  10'  10' 10' 10' 10' 10'             Supine abd dc  L  1#  10x2 L 1# 10x2 L  1#  10x2 L 1# x20 L1# x20   laq dc  L  1#  20x L  1#  20x L  1#  20x L1# x20 L1# x20   slr dc  L  20x L 20x L  20x L 1# x20 L1# x20   saq dc  L  1#  20x L 1# 20x L  1#  20x l 1# 20x : 1# x20   hss w/ strap dc  :20  3x :20 3x :20  3x :20 3x :20x3   Seated marches dc  L  1#  20x L  1#  20x L  1#  20x L 1# 20x L 1# 20x             Stand HR 2# x 20 foam  2# foam x 20 20x 20x 20x 20x    Stand abd, hs curls  2# bilat x 20 foam  2# x 20 foam L  1#  20x bilaterally L  1#  20x bilaterally L  1#  20x  L 1# 20x L1# 20x   Stand ext  2# x 20 foam bilat  2# x 20 foam         Stand marches 2#  X 20 on foam bilat  2# x 20 foam  L 1#  20x bilaterally L 1#  20x bilaterally L  1#  0n foam  B 20x L 1# on foam B 20x L1# on foam B 20x   Step ups  F/ l  8"  2#  bilat x 20 ea  8"  X 20 ea  6"  L  1#  20x each 6"  L  1#  20x each 8"  L 1#  20x each 8" L 1# 20x ea 8" L 1# 20x ea Side step / fwd/ retro @ bar 30'x 2  30' x 2 30'x2 each 30'x2 ea 30'x2  each 30'x2 30'x2   March hand/ opp knee 30' x 2 30'x 2 30'x2 30'x2 30'x2 30'x2 30'x2   tandem 30'x 2 30x 2   30'x2 30'x2 30'x2             Tramp toss bball x 20 foam bball 30x foam         TG L10 20x 2  20x 2         Ball toss on foam and off w/ movement opb   SBA         Side step TB  rtb 30' x 3                             Ther Activity                              Gait Training                              Modalities          CP left hip seated    def 10'

## 2021-07-16 ENCOUNTER — OFFICE VISIT (OUTPATIENT)
Dept: OBGYN CLINIC | Facility: MEDICAL CENTER | Age: 71
End: 2021-07-16

## 2021-07-16 VITALS
SYSTOLIC BLOOD PRESSURE: 112 MMHG | HEIGHT: 62 IN | BODY MASS INDEX: 22.08 KG/M2 | DIASTOLIC BLOOD PRESSURE: 68 MMHG | HEART RATE: 85 BPM | WEIGHT: 120 LBS

## 2021-07-16 DIAGNOSIS — Z96.642 STATUS POST TOTAL HIP REPLACEMENT, LEFT: Primary | ICD-10-CM

## 2021-07-16 DIAGNOSIS — M16.12 ARTHRITIS OF LEFT HIP: ICD-10-CM

## 2021-07-16 DIAGNOSIS — Z47.1 AFTERCARE FOLLOWING LEFT HIP JOINT REPLACEMENT SURGERY: ICD-10-CM

## 2021-07-16 DIAGNOSIS — Z96.642 AFTERCARE FOLLOWING LEFT HIP JOINT REPLACEMENT SURGERY: ICD-10-CM

## 2021-07-16 PROCEDURE — 3008F BODY MASS INDEX DOCD: CPT | Performed by: INTERNAL MEDICINE

## 2021-07-16 PROCEDURE — 99024 POSTOP FOLLOW-UP VISIT: CPT | Performed by: ORTHOPAEDIC SURGERY

## 2021-07-16 NOTE — PROGRESS NOTES
Assessment  Problem List Items Addressed This Visit        Musculoskeletal and Integument    Arthritis of left hip       Other    Status post total hip replacement, left - Primary      Other Visit Diagnoses     Aftercare following left hip joint replacement surgery              Discussion and Plan:    Presents for 6 week follow up s/p left total hip arthroplasty  Doing well and very happy with function of left hip  From a surgical perspective, she can begin driving again and discontinue all postoperative medications provided to her  She should continue maintenance exercises for hip abduction strength  She should follow up in 6 weeks for her 3 month postop visit which should be schedule within the 90 day global perioperative period  Subjective:   Patient ID: Milagros Peterson is a 69 y o  female      Presents for 6 week postop visit from her left total hip arthroplasty  Patient reports she is very happy with her hip and the function that it has restored  She has graduated from physical therapy  She reports no pain to the left hip  She has no complaints and would like to get back to driving  The following portions of the patient's history were reviewed and updated as appropriate: allergies, current medications, past family history, past medical history, past social history, past surgical history and problem list     Review of Systems   Constitutional: Negative for fever  HENT: Negative for congestion  Eyes: Negative for photophobia  Respiratory: Negative for shortness of breath  Cardiovascular: Negative for chest pain  Gastrointestinal: Negative for nausea and vomiting  Musculoskeletal: Positive for arthralgias  Skin: Negative for rash  Neurological: Negative for headaches  Psychiatric/Behavioral: Negative for behavioral problems         Objective:  /68 (BP Location: Left arm, Patient Position: Sitting, Cuff Size: Standard)   Pulse 85   Ht 5' 2" (1 575 m)   Wt 54 4 kg (120 lb)   BMI 21 95 kg/m²       Left Hip Exam     Comments:  Well-healed posterior lateral incision  No erythema or ecchymosis   No tenderness to palpation to lateral hip or anterior groin  Normal range of motion   Good strength   Calf compartment is soft and supple  Toes are warm well perfused            Physical Exam  Vitals reviewed  HENT:      Head: Normocephalic  Right Ear: External ear normal       Left Ear: External ear normal       Nose: Nose normal       Mouth/Throat:      Pharynx: Oropharynx is clear  Eyes:      Pupils: Pupils are equal, round, and reactive to light  Cardiovascular:      Rate and Rhythm: Normal rate  Pulses: Normal pulses  Pulmonary:      Effort: Pulmonary effort is normal    Abdominal:      Palpations: Abdomen is soft  Musculoskeletal:      Cervical back: Normal range of motion  Comments: Please see ortho exam    Skin:     Capillary Refill: Capillary refill takes less than 2 seconds  Neurological:      Mental Status: She is alert  Mental status is at baseline     Psychiatric:         Mood and Affect: Mood normal

## 2021-08-27 ENCOUNTER — OFFICE VISIT (OUTPATIENT)
Dept: OBGYN CLINIC | Facility: MEDICAL CENTER | Age: 71
End: 2021-08-27

## 2021-08-27 ENCOUNTER — APPOINTMENT (OUTPATIENT)
Dept: RADIOLOGY | Facility: MEDICAL CENTER | Age: 71
End: 2021-08-27
Payer: COMMERCIAL

## 2021-08-27 VITALS
HEIGHT: 62 IN | SYSTOLIC BLOOD PRESSURE: 145 MMHG | WEIGHT: 123 LBS | BODY MASS INDEX: 22.63 KG/M2 | HEART RATE: 71 BPM | DIASTOLIC BLOOD PRESSURE: 88 MMHG

## 2021-08-27 DIAGNOSIS — Z96.642 STATUS POST TOTAL HIP REPLACEMENT, LEFT: ICD-10-CM

## 2021-08-27 DIAGNOSIS — Z96.642 AFTERCARE FOLLOWING LEFT HIP JOINT REPLACEMENT SURGERY: ICD-10-CM

## 2021-08-27 DIAGNOSIS — Z96.642 STATUS POST TOTAL HIP REPLACEMENT, LEFT: Primary | ICD-10-CM

## 2021-08-27 DIAGNOSIS — Z47.1 AFTERCARE FOLLOWING LEFT HIP JOINT REPLACEMENT SURGERY: ICD-10-CM

## 2021-08-27 PROCEDURE — 73502 X-RAY EXAM HIP UNI 2-3 VIEWS: CPT

## 2021-08-27 PROCEDURE — 99024 POSTOP FOLLOW-UP VISIT: CPT | Performed by: ORTHOPAEDIC SURGERY

## 2021-08-27 NOTE — PROGRESS NOTES
Assessment:  1  Status post total hip replacement, left  XR hip/pelv 2-3 vws left if performed   2  Aftercare following left hip joint replacement surgery         Plan:    Neeraj Castro may begin to increase her activity to tolerance, advised against running or jumping    Exercise bike, walking and swimming are all great activities     We discussed oral ABX prior to dental procedures for the next 2 years   Follow up in 3 months time for re-evaluation and repeat left hip x-rays, at which point Duncan Reinoso will be 6 months post op  The above stated was discussed in layman's terms and the patient expressed understanding  All questions were answered to the patient's satisfaction  Subjective:   Char Freed is a 66 y o  female who presents to the office aprox  3 months s/p left total hip arthroplasty, DOS 6/3/2021  Overall Duncan Reinoso is doing well  She is very happy with her post operative recovery  Duncan Reinoso states that her pre operative pain has resolved 100 percent  Duncan Reinoso is able to walk 2-3 miles without pain or difficulty  Duncan Reinoso has completed formal therapy  She states that she is able to do everything that she would like to do at this time  She will hold off on Yoga exercises at this time  Duncan Reinoso is not taking anything for pain control         Review of systems negative unless otherwise specified in HPI    Past Medical History:   Diagnosis Date    Breast cancer ([facility] [address] ) 03/01/1993    Hematuria     LAST ASSESSED [id number]       Past Surgical History:   Procedure Laterality Date    BREAST LUMPECTOMY Right 04/01/1993    COLONOSCOPY      FL INJECTION LEFT HIP (NON ARTHROGRAM)  1/19/2021    LYMPH NODE BIOPSY      MOHS SURGERY      [place] TOTAL HIP ARTHROPLASTY Left 6/3/2021    Procedure: ARTHROPLASTY HIP TOTAL;  Surgeon: Dedra Farrell MD;  Location: [facility];  Service: Orthopedics       Family History   Problem Relation Age of Onset    Breast cancer Mother 79    No Known Problems Sister     No Known Problems Daughter     No Known Problems Sister     No Known Problems Sister     No Known Problems Sister     No Known Problems Daughter        Social History     Occupational History    Not on file   Tobacco Use    Smoking status: Never Smoker    Smokeless tobacco: Never Used   Vaping Use    Vaping Use: Never used   Substance and Sexual Activity    Alcohol use: Yes    Drug use: No    Sexual activity: Not Currently         Current Outpatient Medications:     Calcium 500 MG tablet, Take by mouth, Disp: , Rfl:     Progesterone Micronized 10 % CREA, Place on the skin daily , Disp: , Rfl:     rosuvastatin (CRESTOR) 10 MG tablet, TAKE 1 TABLET BY MOUTH EVERY DAY, Disp: 90 tablet, Rfl: 1    valsartan (DIOVAN) 80 mg tablet, TAKE 1 TABLET BY MOUTH EVERY DAY, Disp: 90 tablet, Rfl: 0    No Known Allergies         Vitals:    08/27/21 0947   BP: 145/88   Pulse: 71       Objective:            Physical Exam  Physical Exam:      General Appearance:    Alert, cooperative, no distress, appears stated age   Head:    Normocephalic, without obvious abnormality, atraumatic   Eyes:    conjunctiva/corneas clear, both eyes         Nose:   Nares normal, septum midline, no drainage    Throat:   Lips normal; teeth and gums normal   Neck:    symmetrical, trachea midline, ;     thyroid:  no enlargement/   Back:     Symmetric, no curvature, ROM normal   Lungs:   No audible wheezing or labored breathing   Chest Wall:    No tenderness or deformity    Heart:    Regular rate and rhythm                     Pulses:   2+ and symmetric all extremities   Skin:   Skin color, texture, turgor normal, no rashes or lesions   Neurologic:   normal strength, sensation and reflexes     throughout                       Left Hip Exam     Tenderness   The patient is experiencing no tenderness  Range of Motion   The patient has normal left hip ROM  Muscle Strength   The patient has normal left hip strength       Other   Erythema: absent  Scars: present  Sensation: normal  Pulse: present    Comments: Well healed surgical incision   No warm or redness           Neurovascularly Intact Distally     Diagnostics, reviewed and taken today if performed as documented: The attending physician has personally reviewed the pertinent films in PACS and interpretation is as follows:    X-ray of left left hip performed in the office today demonstrates no acute fracture, well aligned total hip arthroplasty prosthesis without signs of failure or lucency  Procedures, if performed today:    Procedures    None performed      Scribe Attestation    I,:  Luis Day am acting as a scribe while in the presence of the attending physician :       I,:  Pamela Robles MD personally performed the services described in this documentation    as scribed in my presence :             Portions of the record may have been created with voice recognition software  Occasional wrong word or "sound a like" substitutions may have occurred due to the inherent limitations of voice recognition software  Read the chart carefully and recognize, using context, where substitutions have occurred

## 2021-09-13 ENCOUNTER — APPOINTMENT (OUTPATIENT)
Dept: LAB | Facility: CLINIC | Age: 71
End: 2021-09-13
Payer: COMMERCIAL

## 2021-09-13 DIAGNOSIS — E78.2 MIXED HYPERLIPIDEMIA: ICD-10-CM

## 2021-09-13 DIAGNOSIS — I10 ESSENTIAL HYPERTENSION: ICD-10-CM

## 2021-09-13 LAB
ALBUMIN SERPL BCP-MCNC: 3.9 G/DL (ref 3.5–5)
ALP SERPL-CCNC: 67 U/L (ref 46–116)
ALT SERPL W P-5'-P-CCNC: 24 U/L (ref 12–78)
ANION GAP SERPL CALCULATED.3IONS-SCNC: 2 MMOL/L (ref 4–13)
AST SERPL W P-5'-P-CCNC: 25 U/L (ref 5–45)
BASOPHILS # BLD AUTO: 0.04 THOUSANDS/ΜL (ref 0–0.1)
BASOPHILS NFR BLD AUTO: 1 % (ref 0–1)
BILIRUB SERPL-MCNC: 0.56 MG/DL (ref 0.2–1)
BUN SERPL-MCNC: 15 MG/DL (ref 5–25)
CALCIUM SERPL-MCNC: 9 MG/DL (ref 8.3–10.1)
CHLORIDE SERPL-SCNC: 104 MMOL/L (ref 100–108)
CHOLEST SERPL-MCNC: 194 MG/DL (ref 50–200)
CO2 SERPL-SCNC: 31 MMOL/L (ref 21–32)
CREAT SERPL-MCNC: 0.65 MG/DL (ref 0.6–1.3)
EOSINOPHIL # BLD AUTO: 0.26 THOUSAND/ΜL (ref 0–0.61)
EOSINOPHIL NFR BLD AUTO: 5 % (ref 0–6)
ERYTHROCYTE [DISTWIDTH] IN BLOOD BY AUTOMATED COUNT: 12.6 % (ref 11.6–15.1)
GFR SERPL CREATININE-BSD FRML MDRD: 89 ML/MIN/1.73SQ M
GLUCOSE P FAST SERPL-MCNC: 83 MG/DL (ref 65–99)
HCT VFR BLD AUTO: 44 % (ref 34.8–46.1)
HDLC SERPL-MCNC: 68 MG/DL
HGB BLD-MCNC: 14 G/DL (ref 11.5–15.4)
IMM GRANULOCYTES # BLD AUTO: 0.01 THOUSAND/UL (ref 0–0.2)
IMM GRANULOCYTES NFR BLD AUTO: 0 % (ref 0–2)
LDLC SERPL CALC-MCNC: 112 MG/DL (ref 0–100)
LYMPHOCYTES # BLD AUTO: 2.53 THOUSANDS/ΜL (ref 0.6–4.47)
LYMPHOCYTES NFR BLD AUTO: 48 % (ref 14–44)
MCH RBC QN AUTO: 29 PG (ref 26.8–34.3)
MCHC RBC AUTO-ENTMCNC: 31.8 G/DL (ref 31.4–37.4)
MCV RBC AUTO: 91 FL (ref 82–98)
MONOCYTES # BLD AUTO: 0.43 THOUSAND/ΜL (ref 0.17–1.22)
MONOCYTES NFR BLD AUTO: 8 % (ref 4–12)
NEUTROPHILS # BLD AUTO: 2.05 THOUSANDS/ΜL (ref 1.85–7.62)
NEUTS SEG NFR BLD AUTO: 38 % (ref 43–75)
NONHDLC SERPL-MCNC: 126 MG/DL
NRBC BLD AUTO-RTO: 0 /100 WBCS
PLATELET # BLD AUTO: 232 THOUSANDS/UL (ref 149–390)
PMV BLD AUTO: 10.6 FL (ref 8.9–12.7)
POTASSIUM SERPL-SCNC: 4 MMOL/L (ref 3.5–5.3)
PROT SERPL-MCNC: 7.5 G/DL (ref 6.4–8.2)
RBC # BLD AUTO: 4.83 MILLION/UL (ref 3.81–5.12)
SODIUM SERPL-SCNC: 137 MMOL/L (ref 136–145)
TRIGL SERPL-MCNC: 68 MG/DL
TSH SERPL DL<=0.05 MIU/L-ACNC: 1.76 UIU/ML (ref 0.36–3.74)
WBC # BLD AUTO: 5.32 THOUSAND/UL (ref 4.31–10.16)

## 2021-09-13 PROCEDURE — 80053 COMPREHEN METABOLIC PANEL: CPT

## 2021-09-13 PROCEDURE — 85025 COMPLETE CBC W/AUTO DIFF WBC: CPT

## 2021-09-13 PROCEDURE — 84443 ASSAY THYROID STIM HORMONE: CPT

## 2021-09-13 PROCEDURE — 36415 COLL VENOUS BLD VENIPUNCTURE: CPT

## 2021-09-13 PROCEDURE — 80061 LIPID PANEL: CPT

## 2021-09-16 DIAGNOSIS — E78.2 MIXED HYPERLIPIDEMIA: ICD-10-CM

## 2021-09-16 RX ORDER — ROSUVASTATIN CALCIUM 10 MG/1
TABLET, COATED ORAL
Qty: 90 TABLET | Refills: 1 | Status: SHIPPED | OUTPATIENT
Start: 2021-09-16 | End: 2022-03-16

## 2021-09-17 ENCOUNTER — OFFICE VISIT (OUTPATIENT)
Dept: INTERNAL MEDICINE CLINIC | Facility: CLINIC | Age: 71
End: 2021-09-17
Payer: COMMERCIAL

## 2021-09-17 VITALS
WEIGHT: 123.4 LBS | RESPIRATION RATE: 12 BRPM | TEMPERATURE: 96.3 F | HEART RATE: 75 BPM | BODY MASS INDEX: 22.71 KG/M2 | DIASTOLIC BLOOD PRESSURE: 70 MMHG | OXYGEN SATURATION: 98 % | SYSTOLIC BLOOD PRESSURE: 116 MMHG | HEIGHT: 62 IN

## 2021-09-17 DIAGNOSIS — M85.80 OSTEOPENIA, UNSPECIFIED LOCATION: ICD-10-CM

## 2021-09-17 DIAGNOSIS — E78.2 MIXED HYPERLIPIDEMIA: ICD-10-CM

## 2021-09-17 DIAGNOSIS — R60.0 EDEMA OF LEFT LOWER EXTREMITY: ICD-10-CM

## 2021-09-17 DIAGNOSIS — R73.01 IMPAIRED FASTING GLUCOSE: ICD-10-CM

## 2021-09-17 DIAGNOSIS — I10 ESSENTIAL HYPERTENSION: Primary | ICD-10-CM

## 2021-09-17 DIAGNOSIS — Z12.31 SCREENING MAMMOGRAM FOR HIGH-RISK PATIENT: ICD-10-CM

## 2021-09-17 PROCEDURE — 1160F RVW MEDS BY RX/DR IN RCRD: CPT | Performed by: INTERNAL MEDICINE

## 2021-09-17 PROCEDURE — 3008F BODY MASS INDEX DOCD: CPT | Performed by: INTERNAL MEDICINE

## 2021-09-17 PROCEDURE — 3074F SYST BP LT 130 MM HG: CPT | Performed by: INTERNAL MEDICINE

## 2021-09-17 PROCEDURE — 3288F FALL RISK ASSESSMENT DOCD: CPT | Performed by: INTERNAL MEDICINE

## 2021-09-17 PROCEDURE — 3078F DIAST BP <80 MM HG: CPT | Performed by: INTERNAL MEDICINE

## 2021-09-17 PROCEDURE — 1036F TOBACCO NON-USER: CPT | Performed by: INTERNAL MEDICINE

## 2021-09-17 PROCEDURE — 1170F FXNL STATUS ASSESSED: CPT | Performed by: INTERNAL MEDICINE

## 2021-09-17 PROCEDURE — 1125F AMNT PAIN NOTED PAIN PRSNT: CPT | Performed by: INTERNAL MEDICINE

## 2021-09-17 PROCEDURE — 99214 OFFICE O/P EST MOD 30 MIN: CPT | Performed by: INTERNAL MEDICINE

## 2021-09-17 NOTE — ASSESSMENT & PLAN NOTE
Last LDL-113  No history of coronary artery disease or any stroke  Continue Crestor    Advised on regular exercise and watching her diet

## 2021-09-17 NOTE — ASSESSMENT & PLAN NOTE
Blood pressure seems to be pretty well controlled    Continue Roselyn  Continue regular exercise and watch her salt intake

## 2021-09-17 NOTE — PATIENT INSTRUCTIONS
Medicare Preventive Visit Patient Instructions  Thank you for completing your Welcome to Medicare Visit or Medicare Annual Wellness Visit today  Your next wellness visit will be due in one year (9/18/2022)  The screening/preventive services that you may require over the next 5-10 years are detailed below  Some tests may not apply to you based off risk factors and/or age  Screening tests ordered at today's visit but not completed yet may show as past due  Also, please note that scanned in results may not display below  Preventive Screenings:  Service Recommendations Previous Testing/Comments   Colorectal Cancer Screening  * Colonoscopy    * Fecal Occult Blood Test (FOBT)/Fecal Immunochemical Test (FIT)  * Fecal DNA/Cologuard Test  * Flexible Sigmoidoscopy Age: 54-65 years old   Colonoscopy: every 10 years (may be performed more frequently if at higher risk)  OR  FOBT/FIT: every 1 year  OR  Cologuard: every 3 years  OR  Sigmoidoscopy: every 5 years  Screening may be recommended earlier than age 48 if at higher risk for colorectal cancer  Also, an individualized decision between you and your healthcare provider will decide whether screening between the ages of 74-80 would be appropriate  Colonoscopy: 06/18/2018  FOBT/FIT: Not on file  Cologuard: Not on file  Sigmoidoscopy: Not on file    Screening Current     Breast Cancer Screening Age: 36 years old  Frequency: every 1-2 years  Not required if history of left and right mastectomy Mammogram: 10/07/2020    History Breast Cancer   Cervical Cancer Screening Between the ages of 21-29, pap smear recommended once every 3 years  Between the ages of 33-67, can perform pap smear with HPV co-testing every 5 years     Recommendations may differ for women with a history of total hysterectomy, cervical cancer, or abnormal pap smears in past  Pap Smear: Not on file    Screening Not Indicated   Hepatitis C Screening Once for adults born between 1945 and 1965  More frequently in patients at high risk for Hepatitis C Hep C Antibody: 02/20/2020    Screening Current   Diabetes Screening 1-2 times per year if you're at risk for diabetes or have pre-diabetes Fasting glucose: 83 mg/dL   A1C: 5 4 %    Screening Current   Cholesterol Screening Once every 5 years if you don't have a lipid disorder  May order more often based on risk factors  Lipid panel: 09/13/2021    Screening Not Indicated  History Lipid Disorder     Other Preventive Screenings Covered by Medicare:  1  Abdominal Aortic Aneurysm (AAA) Screening: covered once if your at risk  You're considered to be at risk if you have a family history of AAA  2  Lung Cancer Screening: covers low dose CT scan once per year if you meet all of the following conditions: (1) Age 50-69; (2) No signs or symptoms of lung cancer; (3) Current smoker or have quit smoking within the last 15 years; (4) You have a tobacco smoking history of at least 30 pack years (packs per day multiplied by number of years you smoked); (5) You get a written order from a healthcare provider  3  Glaucoma Screening: covered annually if you're considered high risk: (1) You have diabetes OR (2) Family history of glaucoma OR (3)  aged 48 and older OR (3)  American aged 72 and older  3  Osteoporosis Screening: covered every 2 years if you meet one of the following conditions: (1) You're estrogen deficient and at risk for osteoporosis based off medical history and other findings; (2) Have a vertebral abnormality; (3) On glucocorticoid therapy for more than 3 months; (4) Have primary hyperparathyroidism; (5) On osteoporosis medications and need to assess response to drug therapy  · Last bone density test (DXA Scan): 09/17/2019   5  HIV Screening: covered annually if you're between the age of 15-65  Also covered annually if you are younger than 13 and older than 72 with risk factors for HIV infection   For pregnant patients, it is covered up to 3 times per pregnancy  Immunizations:  Immunization Recommendations   Influenza Vaccine Annual influenza vaccination during flu season is recommended for all persons aged >= 6 months who do not have contraindications   Pneumococcal Vaccine (Prevnar and Pneumovax)  * Prevnar = PCV13  * Pneumovax = PPSV23   Adults 25-60 years old: 1-3 doses may be recommended based on certain risk factors  Adults 72 years old: Prevnar (PCV13) vaccine recommended followed by Pneumovax (PPSV23) vaccine  If already received PPSV23 since turning 65, then PCV13 recommended at least one year after PPSV23 dose  Hepatitis B Vaccine 3 dose series if at intermediate or high risk (ex: diabetes, end stage renal disease, liver disease)   Tetanus (Td) Vaccine - COST NOT COVERED BY MEDICARE PART B Following completion of primary series, a booster dose should be given every 10 years to maintain immunity against tetanus  Td may also be given as tetanus wound prophylaxis  Tdap Vaccine - COST NOT COVERED BY MEDICARE PART B Recommended at least once for all adults  For pregnant patients, recommended with each pregnancy  Shingles Vaccine (Shingrix) - COST NOT COVERED BY MEDICARE PART B  2 shot series recommended in those aged 48 and above     Health Maintenance Due:      Topic Date Due    DXA SCAN  09/17/2021    Breast Cancer Screening: Mammogram  10/07/2021    Colorectal Cancer Screening  06/18/2023    Hepatitis C Screening  Completed     Immunizations Due:      Topic Date Due    DTaP,Tdap,and Td Vaccines (1 - Tdap) 08[birth year]14/1970    Influenza Vaccine (1) 09/01/2021     Advance Directives   What are advance directives? Advance directives are legal documents that state your wishes and plans for medical care  These plans are made ahead of time in case you lose your ability to make decisions for yourself  Advance directives can apply to any medical decision, such as the treatments you want, and if you want to donate organs     What are the types of advance directives? There are many types of advance directives, and each state has rules about how to use them  You may choose a combination of any of the following:  · Living will: This is a written record of the treatment you want  You can also choose which treatments you do not want, which to limit, and which to stop at a certain time  This includes surgery, medicine, IV fluid, and tube feedings  · Durable power of  for healthcare [facility]): This is a written record that states who you want to make healthcare choices for you when you are unable to make them for yourself  This person, called a proxy, is usually a family member or a friend  You may choose more than 1 proxy  · Do not resuscitate (DNR) order:  A DNR order is used in case your heart stops beating or you stop breathing  It is a request not to have certain forms of treatment, such as CPR  A DNR order may be included in other types of advance directives  · Medical directive: This covers the care that you want if you are in a coma, near death, or unable to make decisions for yourself  You can list the treatments you want for each condition  Treatment may include pain medicine, surgery, blood transfusions, dialysis, IV or tube feedings, and a ventilator (breathing machine)  · Values history: This document has questions about your views, beliefs, and how you feel and think about life  This information can help others choose the care that you would choose  Why are advance directives important? An advance directive helps you control your care  Although spoken wishes may be used, it is better to have your wishes written down  Spoken wishes can be misunderstood, or not followed  Treatments may be given even if you do not want them  An advance directive may make it easier for your family to make difficult choices about your care        © Copyright [employer] 2018 Information is for End User's use only and may not be sold, redistributed or otherwise used for commercial purposes  All illustrations and images included in CareNotes® are the copyrighted property of A D A M , Inc  or Jeronimo Laguerre ADVISED TO GET MAMMOGRAM DONE  PRESCRIPTION FOR THIS HAS BEEN PROVIDED TO YOU    REPEAT LAB WORK IN 6 MONTHS    FOLLOW-UP IN 6

## 2021-09-17 NOTE — PROGRESS NOTES
Assessment/Plan:    Hypertension  Blood pressure seems to be pretty well controlled  Continue Diovan  Continue regular exercise and watch her salt intake    Hyperlipidemia  Last LDL-113  No history of coronary artery disease or any stroke  Continue Crestor  Advised on regular exercise and watching her diet    Osteopenia  Continue calcium and vitamin-D supplementation    Edema of left lower extremity  Edema of left lower extremity likely secondary to Left Hip Replacement  Advised on compression stockings  These have been ordered for the patient  Advised lower leg elevation        Diagnoses and all orders for this visit:    Essential hypertension  -     CBC and differential; Future  -     Comprehensive metabolic panel; Future    Screening mammogram for high-risk patient  -     Mammo screening bilateral w 3d & cad; Future    Osteopenia, unspecified location  -     DXA bone density spine hip and pelvis; Future  -     CBC and differential; Future  -     Comprehensive metabolic panel; Future  -     Vitamin D 25 hydroxy; Future    Mixed hyperlipidemia  -     Lipid panel; Future    Impaired fasting glucose  -     HEMOGLOBIN A1C W/ EAG ESTIMATION; Future    Edema of left lower extremity  -     Compression Stocking          Subjective:      Patient ID: [place] Waqas is a 66 y o  female  HPI    This is a 66years old female who is here in the clinic for routine follow-up  Patient underwent left hip replacement in June 2021  Tolerated the procedure well, currently asymptomatic  Patient offers no new active complaints  Has history of hypertension hyperlipidemia, compliant with the medication  Advised to take medications in the evening  Reports no she is trying to exercise more since she is more functional after hip replacement  Reports of left ankle swelling since surgery  Patient lives with her , children live nearby, visit often    The following portions of the patient's history were reviewed and updated as appropriate: allergies, current medications, past family history, past medical history, past social history, past surgical history and problem list     Review of Systems   Constitutional: Negative for chills and fever  HENT: Negative for ear pain and sore throat  Eyes: Negative for pain and visual disturbance  Respiratory: Negative for cough and shortness of breath  Cardiovascular: Negative for chest pain and palpitations  Gastrointestinal: Negative for abdominal pain and vomiting  Genitourinary: Negative for dysuria and hematuria  Musculoskeletal: Negative for arthralgias and back pain  Skin: Negative for color change and rash  Neurological: Negative for seizures and syncope  All other systems reviewed and are negative  Objective:      /70 (BP Location: Left arm, Patient Position: Sitting, Cuff Size: Standard)   Pulse 75   Temp (!) 96 3 °F (35 7 °C) (Tympanic)   Resp 12   Ht 5' 2" (1 575 m)   Wt 56 kg (123 lb 6 4 oz)   SpO2 98%   BMI 22 57 kg/m²          Physical Exam  Constitutional:       Appearance: Normal appearance  HENT:      Head: Normocephalic and atraumatic  Mouth/Throat:      Mouth: Mucous membranes are moist    Cardiovascular:      Rate and Rhythm: Normal rate  Heart sounds: Murmur heard  Pulmonary:      Effort: Pulmonary effort is normal       Breath sounds: Normal breath sounds  Abdominal:      General: Abdomen is flat  Tenderness: There is no abdominal tenderness  Musculoskeletal:         General: Normal range of motion  Right lower leg: No edema  Left lower leg: Edema present  Neurological:      General: No focal deficit present  Mental Status: She is alert and oriented to person, place, and time

## 2021-09-17 NOTE — ASSESSMENT & PLAN NOTE
Edema of left lower extremity likely secondary to Left Hip Replacement  Advised on compression stockings  These have been ordered for the patient    Advised lower leg elevation

## 2021-09-17 NOTE — PROGRESS NOTES
Assessment and Plan:     Problem List Items Addressed This Visit     None           Preventive health issues were discussed with patient, and age appropriate screening tests were ordered as noted in patient's After Visit Summary  Personalized health advice and appropriate referrals for health education or preventive services given if needed, as noted in patient's After Visit Summary       History of Present Illness:     Patient presents for Medicare Annual Wellness visit    Patient Care Team:  Jaja Gonzalez MD as PCP - General  Jaja Gonzalez MD as PCP - [address] [place], [place] [address])  Jaja Gonzalez MD as PCP - [facility]  MD Agustin Amaral MD Jarrell Fees RN as Registered Nurse (Orthopedics)     Problem List:     Patient Active Problem List   Diagnosis    Hyperlipidemia    Hypertension    Microhematuria    Osteopenia    Personal history of breast cancer    Renal cyst    Thrombocytopenia ([facility] [address] )    Arthritis of left hip    Status post total hip replacement, left      Past Medical and Surgical History:     Past Medical History:   Diagnosis Date    Breast cancer ([facility] [address] ) 03/01/1993    Hematuria     LAST ASSESSED [id number]     Past Surgical History:   Procedure Laterality Date    BREAST LUMPECTOMY Right 04/01/1993    COLONOSCOPY      FL INJECTION LEFT HIP (NON ARTHROGRAM)  1/19/2021    LYMPH NODE BIOPSY      MOHS SURGERY      MS TOTAL HIP ARTHROPLASTY Left 6/3/2021    Procedure: ARTHROPLASTY HIP TOTAL;  Surgeon: Michi Silva MD;  [facility]: [facility];  Service: Orthopedics      Family History:     Family History   Problem Relation Age of Onset    Breast cancer Mother 79    No Known Problems Sister     No Known Problems Daughter     No Known Problems Sister     No Known Problems Sister     No Known Problems Sister     No Known Problems Daughter       Social History:     Social History     Socioeconomic History    Marital status: /Civil Union Spouse name: None    Number of children: None    Years of education: None    Highest education level: None   Occupational History    None   Tobacco Use    Smoking status: Never Smoker    Smokeless tobacco: Never Used   Vaping Use    Vaping Use: Never used   Substance and Sexual Activity    Alcohol use: Yes    Drug use: No    Sexual activity: Not Currently   Other Topics Concern    None   Social History Narrative    NO ADVANCED DIRECTIVES      Social Determinants of Health     Financial Resource Strain:     Difficulty of Paying Living Expenses:    Food Insecurity:     Worried About Running Out of Food in the Last Year:     Ran Out of Food in the Last Year:    Transportation Needs:     Lack of Transportation (Medical):      Lack of Transportation (Non-Medical):    Physical Activity: Sufficiently Active    Days of Exercise per Week: 6 days    Minutes of Exercise per Session: 30 min   Stress: No Stress Concern Present    Feeling of Stress : Not at all   Social Connections:     Frequency of Communication with Friends and Family:     Frequency of Social Gatherings with Friends and Family:     Attends Holiness Services:     Active Member of Clubs or Organizations:     Attends Club or Organization Meetings:     Marital Status:    Intimate Partner Violence:     Fear of Current or Ex-Partner:     Emotionally Abused:     Physically Abused:     Sexually Abused:       Medications and Allergies:     Current Outpatient Medications   Medication Sig Dispense Refill    Calcium 500 MG tablet Take by mouth      Progesterone Micronized 10 % CREA Place on the skin daily       rosuvastatin (CRESTOR) 10 MG tablet TAKE 1 TABLET BY MOUTH EVERY DAY 90 tablet 1    valsartan (DIOVAN) 80 mg tablet TAKE 1 TABLET BY MOUTH EVERY DAY 90 tablet 0    acetaminophen (Acetaminophen 8 Hour) 650 mg CR tablet Take 650 mg by mouth every 8 (eight) hours as needed for mild pain      docusate sodium (COLACE) 100 mg capsule Take 1 capsule (100 mg total) by mouth 2 (two) times a day 10 capsule 0    enoxaparin (LOVENOX) 40 mg/0 4 mL INJECT 0 4 ML (40 MG TOTAL) UNDER THE SKIN DAILY (Patient not taking: Reported on 5/24/2021) 11 2 Syringe 0    methocarbamol (ROBAXIN) 500 mg tablet Take 1 tablet (500 mg total) by mouth 4 (four) times a day for 10 days As needed for muscle spasms may cause drowsiness  40 tablet 0    oxyCODONE (ROXICODONE) 5 mg immediate release tablet 1 pill po Q4 Hrs prn 30 tablet 0     No current facility-administered medications for this visit  No Known Allergies   Immunizations:     Immunization History   Administered Date(s) Administered    INFLUENZA 12/17/2018, 10/22/2020    Influenza Split High Dose Preservative Free IM 12/17/2018    Influenza, seasonal, injectable 1950    Pneumococcal Conjugate 13-Valent 12/09/2015    Pneumococcal Polysaccharide PPV23 07/20/2017    SARS-CoV-2 / COVID-19 mRNA IM (Pfizer-BioNTech) 03/10/2021, 04/01/2021    Tdap 1950    Zoster 01/01/2012    Zoster Vaccine Recombinant 01/01/2012, 10/22/2020    influenza, trivalent, adjuvanted 11/12/2019      Health Maintenance:         Topic Date Due    DXA SCAN  09/17/2021    Breast Cancer Screening: Mammogram  10/07/2021    Colorectal Cancer Screening  06/18/2023    Hepatitis C Screening  Completed         Topic Date Due    DTaP,Tdap,and Td Vaccines (1 - Tdap) 08/14/1970    Influenza Vaccine (1) 09/01/2021      Medicare Health Risk Assessment:     /70 (BP Location: Left arm, Patient Position: Sitting, Cuff Size: Standard)   Pulse 75   Temp (!) 96 3 °F (35 7 °C) (Tympanic)   Resp 12   Ht 5' 2" (1 575 m)   Wt 56 kg (123 lb 6 4 oz)   SpO2 98%   BMI 22 57 kg/m²      Rell Ge is here for her Subsequent Wellness visit  Health Risk Assessment:   Patient rates overall health as excellent  Patient feels that their physical health rating is much better  Patient is very satisfied with their life   Eyesight was rated as slightly worse  Hearing was rated as same  Patient feels that their emotional and mental health rating is slightly better  Patients states they are sometimes angry  Patient states they are never, rarely unusually tired/fatigued  Pain experienced in the last 7 days has been none  Patient states that she has experienced no weight loss or gain in last 6 months  Fall Risk Screening: In the past year, patient has experienced: no history of falling in past year      Home Safety:  Patient does not have trouble with stairs inside or outside of their home  Patient has working smoke alarms and has no working carbon monoxide detector  Home safety hazards include: loose rugs on the floor  Nutrition:   Current diet is Regular  Medications:   Patient is currently taking over-the-counter supplements  OTC medications include: see medication list  Patient is able to manage medications  Activities of Daily Living (ADLs)/Instrumental Activities of Daily Living (IADLs):   Walk and transfer into and out of bed and chair?: Yes  Dress and groom yourself?: Yes    Bathe or shower yourself?: Yes    Feed yourself?  Yes  Do your laundry/housekeeping?: Yes  Manage your money, pay your bills and track your expenses?: Yes  Make your own meals?: Yes    Do your own shopping?: Yes    Previous Hospitalizations:   Any hospitalizations or ED visits within the last 12 months?: Yes    How many hospitalizations have you had in the last year?: 1-2    Advance Care Planning:   Living will: No      PREVENTIVE SCREENINGS      Cardiovascular Screening:    General: Screening Not Indicated and History Lipid Disorder      Diabetes Screening:     General: Screening Current      Colorectal Cancer Screening:     General: Screening Current      Breast Cancer Screening:     General: History Breast Cancer      Cervical Cancer Screening:    General: Screening Not Indicated      Osteoporosis Screening:    General: Screening Current      Lung Cancer Screening:     General: Screening Not Indicated      Hepatitis C Screening:    General: Screening Current    Screening, Brief Intervention, and Referral to Treatment (SBIRT)    Screening  Typical number of drinks in a day: 1  Typical number of drinks in a week: 7  Interpretation: Low risk drinking behavior      Single Item Drug Screening:  How often have you used an illegal drug (including marijuana) or a prescription medication for non-medical reasons in the past year? never    Single Item Drug Screen Score: 0  Interpretation: Negative screen for possible drug use disorder      Mayte Ramírez MD

## 2021-09-20 ENCOUNTER — TELEPHONE (OUTPATIENT)
Dept: INTERNAL MEDICINE CLINIC | Facility: CLINIC | Age: 71
End: 2021-09-20

## 2021-09-20 NOTE — TELEPHONE ENCOUNTER
TOMORROW  HEALTH  CALLED   SAID  THEY  RECEIVE  THE  ORDER   NOW  THEY  NEED  SOME  OFFICE   NOTES   ON  WHY  SHE  NEEDS  THE  [facility]   AND  IT NEEDS  TO   SAY  SHE  HAS  VENOUS SEPSIS  ULCERS  SO  THAT  HER  INSURANCE  WILL   PAY  FOR  THE  [facility]     FAX    [phone] ANY  QUESTIONS   CALL  [phone]

## 2021-09-20 NOTE — TELEPHONE ENCOUNTER
TOMORROW  HEALTH  CALLED   SAID  THEY  RECEIVE  THE  ORDER   NOW  THEY  NEED  SOME  OFFICE   NOTES   ON  WHY  SHE  NEEDS  THE  [address]   AND  IT NEEDS  TO   SAY  SHE  HAS  VENOUS SEPSIS  ULCERS

## 2021-09-20 NOTE — TELEPHONE ENCOUNTER
Was seen 9/17-compression stockings were ordered-when she brought the script to [facility] they told her that we need to send to the order to her insurance 1st    Call back # [phone]

## 2021-10-02 DIAGNOSIS — I10 ESSENTIAL HYPERTENSION: ICD-10-CM

## 2021-10-02 RX ORDER — VALSARTAN 80 MG/1
TABLET ORAL
Qty: 90 TABLET | Refills: 0 | Status: SHIPPED | OUTPATIENT
Start: 2021-10-02 | End: 2022-01-03

## 2021-10-08 ENCOUNTER — TELEPHONE (OUTPATIENT)
Dept: UROLOGY | Facility: AMBULATORY SURGERY CENTER | Age: 71
End: 2021-10-08

## 2021-10-08 DIAGNOSIS — R31.29 MICROHEMATURIA: Primary | ICD-10-CM

## 2021-10-12 ENCOUNTER — APPOINTMENT (OUTPATIENT)
Dept: LAB | Facility: CLINIC | Age: 71
End: 2021-10-12
Payer: COMMERCIAL

## 2021-10-12 DIAGNOSIS — R31.29 MICROHEMATURIA: ICD-10-CM

## 2021-10-12 LAB
BACTERIA UR QL AUTO: ABNORMAL /HPF
BILIRUB UR QL STRIP: NEGATIVE
CLARITY UR: CLEAR
COLOR UR: YELLOW
GLUCOSE UR STRIP-MCNC: NEGATIVE MG/DL
HGB UR QL STRIP.AUTO: ABNORMAL
HYALINE CASTS #/AREA URNS LPF: ABNORMAL /LPF
KETONES UR STRIP-MCNC: NEGATIVE MG/DL
LEUKOCYTE ESTERASE UR QL STRIP: NEGATIVE
NITRITE UR QL STRIP: NEGATIVE
NON-SQ EPI CELLS URNS QL MICRO: ABNORMAL /HPF
PH UR STRIP.AUTO: 7 [PH]
PROT UR STRIP-MCNC: NEGATIVE MG/DL
RBC #/AREA URNS AUTO: ABNORMAL /HPF
SP GR UR STRIP.AUTO: 1.01 (ref 1–1.03)
UROBILINOGEN UR QL STRIP.AUTO: 0.2 E.U./DL
WBC #/AREA URNS AUTO: ABNORMAL /HPF

## 2021-10-12 PROCEDURE — 81001 URINALYSIS AUTO W/SCOPE: CPT

## 2021-10-14 ENCOUNTER — OFFICE VISIT (OUTPATIENT)
Dept: UROLOGY | Facility: CLINIC | Age: 71
End: 2021-10-14
Payer: COMMERCIAL

## 2021-10-14 VITALS
HEIGHT: 62 IN | SYSTOLIC BLOOD PRESSURE: 104 MMHG | WEIGHT: 123 LBS | HEART RATE: 70 BPM | DIASTOLIC BLOOD PRESSURE: 60 MMHG | BODY MASS INDEX: 22.63 KG/M2

## 2021-10-14 DIAGNOSIS — R31.29 MICROHEMATURIA: Primary | ICD-10-CM

## 2021-10-14 DIAGNOSIS — N28.1 RENAL CYST: ICD-10-CM

## 2021-10-14 PROCEDURE — 3008F BODY MASS INDEX DOCD: CPT | Performed by: UROLOGY

## 2021-10-14 PROCEDURE — 1036F TOBACCO NON-USER: CPT | Performed by: UROLOGY

## 2021-10-14 PROCEDURE — 3074F SYST BP LT 130 MM HG: CPT | Performed by: UROLOGY

## 2021-10-14 PROCEDURE — 99213 OFFICE O/P EST LOW 20 MIN: CPT | Performed by: UROLOGY

## 2021-10-14 PROCEDURE — 3078F DIAST BP <80 MM HG: CPT | Performed by: UROLOGY

## 2021-10-14 PROCEDURE — 1160F RVW MEDS BY RX/DR IN RCRD: CPT | Performed by: UROLOGY

## 2021-11-22 ENCOUNTER — HOSPITAL ENCOUNTER (OUTPATIENT)
Dept: BONE DENSITY | Facility: CLINIC | Age: 71
Discharge: HOME | End: 2021-11-22
Payer: COMMERCIAL

## 2021-11-22 DIAGNOSIS — M85.80 OSTEOPENIA, UNSPECIFIED LOCATION: ICD-10-CM

## 2021-11-22 PROCEDURE — 77080 DXA BONE DENSITY AXIAL: CPT

## 2021-11-26 ENCOUNTER — TELEPHONE (OUTPATIENT)
Dept: INTERNAL MEDICINE CLINIC | Facility: CLINIC | Age: 71
End: 2021-11-26

## 2021-12-03 ENCOUNTER — APPOINTMENT (OUTPATIENT)
Dept: RADIOLOGY | Facility: MEDICAL CENTER | Age: 71
End: 2021-12-03
Attending: PHYSICIAN ASSISTANT
Payer: COMMERCIAL

## 2021-12-03 ENCOUNTER — OFFICE VISIT (OUTPATIENT)
Dept: OBGYN CLINIC | Facility: MEDICAL CENTER | Age: 71
End: 2021-12-03
Payer: COMMERCIAL

## 2021-12-03 VITALS
SYSTOLIC BLOOD PRESSURE: 125 MMHG | BODY MASS INDEX: 22.63 KG/M2 | HEART RATE: 76 BPM | WEIGHT: 123 LBS | HEIGHT: 62 IN | DIASTOLIC BLOOD PRESSURE: 77 MMHG

## 2021-12-03 DIAGNOSIS — Z96.642 STATUS POST TOTAL HIP REPLACEMENT, LEFT: Primary | ICD-10-CM

## 2021-12-03 DIAGNOSIS — Z96.642 STATUS POST TOTAL HIP REPLACEMENT, LEFT: ICD-10-CM

## 2021-12-03 PROCEDURE — 99212 OFFICE O/P EST SF 10 MIN: CPT | Performed by: ORTHOPAEDIC SURGERY

## 2021-12-03 PROCEDURE — 73502 X-RAY EXAM HIP UNI 2-3 VIEWS: CPT

## 2021-12-08 ENCOUNTER — RA CDI HCC (OUTPATIENT)
Dept: OTHER | Facility: HOSPITAL | Age: 71
End: 2021-12-08

## 2021-12-10 ENCOUNTER — IMMUNIZATIONS (OUTPATIENT)
Dept: FAMILY MEDICINE CLINIC | Facility: HOSPITAL | Age: 71
End: 2021-12-10

## 2021-12-10 DIAGNOSIS — Z23 ENCOUNTER FOR IMMUNIZATION: Primary | ICD-10-CM

## 2021-12-10 PROCEDURE — 91300 COVID-19 PFIZER VACC 0.3 ML: CPT

## 2021-12-10 PROCEDURE — 0001A COVID-19 PFIZER VACC 0.3 ML: CPT

## 2021-12-14 ENCOUNTER — OFFICE VISIT (OUTPATIENT)
Dept: INTERNAL MEDICINE CLINIC | Facility: CLINIC | Age: 71
End: 2021-12-14
Payer: COMMERCIAL

## 2021-12-14 VITALS
WEIGHT: 122.8 LBS | SYSTOLIC BLOOD PRESSURE: 124 MMHG | RESPIRATION RATE: 12 BRPM | HEIGHT: 62 IN | HEART RATE: 76 BPM | DIASTOLIC BLOOD PRESSURE: 82 MMHG | BODY MASS INDEX: 22.6 KG/M2

## 2021-12-14 DIAGNOSIS — M81.0 AGE-RELATED OSTEOPOROSIS WITHOUT CURRENT PATHOLOGICAL FRACTURE: Primary | ICD-10-CM

## 2021-12-14 PROCEDURE — 99213 OFFICE O/P EST LOW 20 MIN: CPT | Performed by: INTERNAL MEDICINE

## 2021-12-14 PROCEDURE — 3074F SYST BP LT 130 MM HG: CPT | Performed by: INTERNAL MEDICINE

## 2021-12-14 PROCEDURE — 3008F BODY MASS INDEX DOCD: CPT | Performed by: INTERNAL MEDICINE

## 2021-12-14 PROCEDURE — 3725F SCREEN DEPRESSION PERFORMED: CPT | Performed by: INTERNAL MEDICINE

## 2021-12-14 RX ORDER — RISEDRONATE SODIUM 150 MG/1
150 TABLET, FILM COATED ORAL
Qty: 3 TABLET | Refills: 3 | Status: SHIPPED | OUTPATIENT
Start: 2021-12-14

## 2021-12-17 ENCOUNTER — TELEPHONE (OUTPATIENT)
Dept: INTERNAL MEDICINE CLINIC | Facility: CLINIC | Age: 71
End: 2021-12-17

## 2021-12-17 NOTE — TELEPHONE ENCOUNTER
Patient used that at home COVID-19 test and has a positive result  The instructions said to follow up with PCP  Patient states she went to [facility] and had a PCR test today and is awaiting results  She only has lost of smell/taste  CB with instructions   [phone]

## 2021-12-20 ENCOUNTER — TELEMEDICINE (OUTPATIENT)
Dept: INTERNAL MEDICINE CLINIC | Facility: CLINIC | Age: 71
End: 2021-12-20
Payer: COMMERCIAL

## 2021-12-20 DIAGNOSIS — U07.1 COVID-19: Primary | ICD-10-CM

## 2021-12-20 PROCEDURE — 1160F RVW MEDS BY RX/DR IN RCRD: CPT | Performed by: INTERNAL MEDICINE

## 2021-12-20 PROCEDURE — 1036F TOBACCO NON-USER: CPT | Performed by: INTERNAL MEDICINE

## 2021-12-20 PROCEDURE — 99213 OFFICE O/P EST LOW 20 MIN: CPT | Performed by: INTERNAL MEDICINE

## 2022-01-03 DIAGNOSIS — I10 ESSENTIAL HYPERTENSION: ICD-10-CM

## 2022-01-03 RX ORDER — VALSARTAN 80 MG/1
TABLET ORAL
Qty: 90 TABLET | Refills: 0 | Status: SHIPPED | OUTPATIENT
Start: 2022-01-03 | End: 2022-03-29

## 2022-01-27 ENCOUNTER — HOSPITAL ENCOUNTER (OUTPATIENT)
Dept: MAMMOGRAPHY | Facility: CLINIC | Age: 72
Discharge: HOME | End: 2022-01-27
Payer: COMMERCIAL

## 2022-01-27 DIAGNOSIS — Z12.31 SCREENING MAMMOGRAM FOR HIGH-RISK PATIENT: ICD-10-CM

## 2022-01-27 PROCEDURE — 77063 BREAST TOMOSYNTHESIS BI: CPT

## 2022-01-27 PROCEDURE — 77067 SCR MAMMO BI INCL CAD: CPT

## 2022-03-16 DIAGNOSIS — E78.2 MIXED HYPERLIPIDEMIA: ICD-10-CM

## 2022-03-16 RX ORDER — ROSUVASTATIN CALCIUM 10 MG/1
TABLET, COATED ORAL
Qty: 90 TABLET | Refills: 1 | Status: SHIPPED | OUTPATIENT
Start: 2022-03-16

## 2022-03-28 ENCOUNTER — APPOINTMENT (OUTPATIENT)
Dept: LAB | Facility: CLINIC | Age: 72
End: 2022-03-28
Payer: COMMERCIAL

## 2022-03-28 ENCOUNTER — RA CDI HCC (OUTPATIENT)
Dept: OTHER | Facility: HOSPITAL | Age: 72
End: 2022-03-28

## 2022-03-28 DIAGNOSIS — I10 ESSENTIAL HYPERTENSION: ICD-10-CM

## 2022-03-28 DIAGNOSIS — R73.01 IMPAIRED FASTING GLUCOSE: ICD-10-CM

## 2022-03-28 DIAGNOSIS — M85.80 OSTEOPENIA, UNSPECIFIED LOCATION: ICD-10-CM

## 2022-03-28 DIAGNOSIS — E78.2 MIXED HYPERLIPIDEMIA: ICD-10-CM

## 2022-03-28 LAB
25(OH)D3 SERPL-MCNC: 30.1 NG/ML (ref 30–100)
ALBUMIN SERPL BCP-MCNC: 3.9 G/DL (ref 3.5–5)
ALP SERPL-CCNC: 45 U/L (ref 46–116)
ALT SERPL W P-5'-P-CCNC: 23 U/L (ref 12–78)
ANION GAP SERPL CALCULATED.3IONS-SCNC: 2 MMOL/L (ref 4–13)
AST SERPL W P-5'-P-CCNC: 27 U/L (ref 5–45)
BASOPHILS # BLD AUTO: 0.04 THOUSANDS/ΜL (ref 0–0.1)
BASOPHILS NFR BLD AUTO: 1 % (ref 0–1)
BILIRUB SERPL-MCNC: 0.72 MG/DL (ref 0.2–1)
BUN SERPL-MCNC: 11 MG/DL (ref 5–25)
CALCIUM SERPL-MCNC: 9.5 MG/DL (ref 8.3–10.1)
CHLORIDE SERPL-SCNC: 105 MMOL/L (ref 100–108)
CHOLEST SERPL-MCNC: 198 MG/DL
CO2 SERPL-SCNC: 32 MMOL/L (ref 21–32)
CREAT SERPL-MCNC: 0.77 MG/DL (ref 0.6–1.3)
EOSINOPHIL # BLD AUTO: 0.23 THOUSAND/ΜL (ref 0–0.61)
EOSINOPHIL NFR BLD AUTO: 4 % (ref 0–6)
ERYTHROCYTE [DISTWIDTH] IN BLOOD BY AUTOMATED COUNT: 13.5 % (ref 11.6–15.1)
EST. AVERAGE GLUCOSE BLD GHB EST-MCNC: 111 MG/DL
GFR SERPL CREATININE-BSD FRML MDRD: 77 ML/MIN/1.73SQ M
GLUCOSE P FAST SERPL-MCNC: 102 MG/DL (ref 65–99)
HBA1C MFR BLD: 5.5 %
HCT VFR BLD AUTO: 43.2 % (ref 34.8–46.1)
HDLC SERPL-MCNC: 73 MG/DL
HGB BLD-MCNC: 13.8 G/DL (ref 11.5–15.4)
IMM GRANULOCYTES # BLD AUTO: 0.01 THOUSAND/UL (ref 0–0.2)
IMM GRANULOCYTES NFR BLD AUTO: 0 % (ref 0–2)
LDLC SERPL CALC-MCNC: 112 MG/DL (ref 0–100)
LYMPHOCYTES # BLD AUTO: 1.94 THOUSANDS/ΜL (ref 0.6–4.47)
LYMPHOCYTES NFR BLD AUTO: 33 % (ref 14–44)
MCH RBC QN AUTO: 29.2 PG (ref 26.8–34.3)
MCHC RBC AUTO-ENTMCNC: 31.9 G/DL (ref 31.4–37.4)
MCV RBC AUTO: 92 FL (ref 82–98)
MONOCYTES # BLD AUTO: 0.57 THOUSAND/ΜL (ref 0.17–1.22)
MONOCYTES NFR BLD AUTO: 10 % (ref 4–12)
NEUTROPHILS # BLD AUTO: 3.14 THOUSANDS/ΜL (ref 1.85–7.62)
NEUTS SEG NFR BLD AUTO: 52 % (ref 43–75)
NONHDLC SERPL-MCNC: 125 MG/DL
NRBC BLD AUTO-RTO: 0 /100 WBCS
PLATELET # BLD AUTO: 194 THOUSANDS/UL (ref 149–390)
PMV BLD AUTO: 11 FL (ref 8.9–12.7)
POTASSIUM SERPL-SCNC: 4.1 MMOL/L (ref 3.5–5.3)
PROT SERPL-MCNC: 7.4 G/DL (ref 6.4–8.2)
RBC # BLD AUTO: 4.72 MILLION/UL (ref 3.81–5.12)
SODIUM SERPL-SCNC: 139 MMOL/L (ref 136–145)
TRIGL SERPL-MCNC: 67 MG/DL
WBC # BLD AUTO: 5.93 THOUSAND/UL (ref 4.31–10.16)

## 2022-03-28 PROCEDURE — 80053 COMPREHEN METABOLIC PANEL: CPT

## 2022-03-28 PROCEDURE — 36415 COLL VENOUS BLD VENIPUNCTURE: CPT

## 2022-03-28 PROCEDURE — 83036 HEMOGLOBIN GLYCOSYLATED A1C: CPT

## 2022-03-28 PROCEDURE — 80061 LIPID PANEL: CPT

## 2022-03-28 PROCEDURE — 82306 VITAMIN D 25 HYDROXY: CPT

## 2022-03-28 PROCEDURE — 85025 COMPLETE CBC W/AUTO DIFF WBC: CPT

## 2022-03-28 NOTE — PROGRESS NOTES
[facility]  coding opportunities       Chart reviewed, no opportunity found:   [address]        Patients Insurance     Medicare Insurance: [employer]

## 2022-03-29 DIAGNOSIS — I10 ESSENTIAL HYPERTENSION: ICD-10-CM

## 2022-03-29 RX ORDER — VALSARTAN 80 MG/1
TABLET ORAL
Qty: 90 TABLET | Refills: 0 | Status: SHIPPED | OUTPATIENT
Start: 2022-03-29 | End: 2022-06-27

## 2022-04-04 ENCOUNTER — OFFICE VISIT (OUTPATIENT)
Dept: INTERNAL MEDICINE CLINIC | Facility: CLINIC | Age: 72
End: 2022-04-04
Payer: COMMERCIAL

## 2022-04-04 VITALS
SYSTOLIC BLOOD PRESSURE: 130 MMHG | HEIGHT: 62 IN | RESPIRATION RATE: 16 BRPM | TEMPERATURE: 97.9 F | HEART RATE: 69 BPM | OXYGEN SATURATION: 98 % | BODY MASS INDEX: 23.26 KG/M2 | DIASTOLIC BLOOD PRESSURE: 80 MMHG | WEIGHT: 126.4 LBS

## 2022-04-04 DIAGNOSIS — R73.01 IMPAIRED FASTING GLUCOSE: ICD-10-CM

## 2022-04-04 DIAGNOSIS — N28.1 RENAL CYST: ICD-10-CM

## 2022-04-04 DIAGNOSIS — E78.2 MIXED HYPERLIPIDEMIA: ICD-10-CM

## 2022-04-04 DIAGNOSIS — I10 PRIMARY HYPERTENSION: ICD-10-CM

## 2022-04-04 DIAGNOSIS — Z85.3 PERSONAL HISTORY OF BREAST CANCER: ICD-10-CM

## 2022-04-04 DIAGNOSIS — R31.29 MICROHEMATURIA: Primary | ICD-10-CM

## 2022-04-04 PROCEDURE — 3288F FALL RISK ASSESSMENT DOCD: CPT | Performed by: INTERNAL MEDICINE

## 2022-04-04 PROCEDURE — 3725F SCREEN DEPRESSION PERFORMED: CPT | Performed by: INTERNAL MEDICINE

## 2022-04-04 PROCEDURE — 1170F FXNL STATUS ASSESSED: CPT | Performed by: INTERNAL MEDICINE

## 2022-04-04 PROCEDURE — 3008F BODY MASS INDEX DOCD: CPT | Performed by: INTERNAL MEDICINE

## 2022-04-04 PROCEDURE — 1160F RVW MEDS BY RX/DR IN RCRD: CPT | Performed by: INTERNAL MEDICINE

## 2022-04-04 PROCEDURE — G0439 PPPS, SUBSEQ VISIT: HCPCS | Performed by: INTERNAL MEDICINE

## 2022-04-04 PROCEDURE — 99214 OFFICE O/P EST MOD 30 MIN: CPT | Performed by: INTERNAL MEDICINE

## 2022-04-04 PROCEDURE — 3075F SYST BP GE 130 - 139MM HG: CPT | Performed by: INTERNAL MEDICINE

## 2022-04-04 PROCEDURE — 3079F DIAST BP 80-89 MM HG: CPT | Performed by: INTERNAL MEDICINE

## 2022-04-04 PROCEDURE — 1036F TOBACCO NON-USER: CPT | Performed by: INTERNAL MEDICINE

## 2022-04-04 PROCEDURE — 1125F AMNT PAIN NOTED PAIN PRSNT: CPT | Performed by: INTERNAL MEDICINE

## 2022-04-04 NOTE — PROGRESS NOTES
Assessment/Plan:    Diagnoses and all orders for this visit:    Microhematuria    Renal cyst    Primary hypertension    Mixed hyperlipidemia  -     CBC and differential; Future  -     Comprehensive metabolic panel; Future  -     Lipid panel; Future  -     TSH, 3rd generation with Free T4 reflex; Future    Personal history of breast cancer    Impaired fasting glucose  -     Hemoglobin A1C; Future              Patient Instructions     Lab data reviewed in detail and compared prior    Hypertension stable on present regimen    Hyperlipidemia improved on rosuvastatin    Impaired fasting glucose stable with A1c 5 5    History of microscopic hematuria-negative urology workup in December 2016, recent urinalysis with no red blood cells  No further workup  Bosniak 2 F renal cyst remained stable over 3 year surveillance, no further workup  Osteoporosis-continue on risedronate    Health maintenance is up-to-date, we discussed 2nd COVID booster, timing should be ideally before end of June  Medicare Preventive Visit Patient Instructions  Thank you for completing your Welcome to Medicare Visit or Medicare Annual Wellness Visit today  Your next wellness visit will be due in one year (4/5/2023)  The screening/preventive services that you may require over the next 5-10 years are detailed below  Some tests may not apply to you based off risk factors and/or age  Screening tests ordered at today's visit but not completed yet may show as past due  Also, please note that scanned in results may not display below    Preventive Screenings:  Service Recommendations Previous Testing/Comments   Colorectal Cancer Screening  * Colonoscopy    * Fecal Occult Blood Test (FOBT)/Fecal Immunochemical Test (FIT)  * Fecal DNA/Cologuard Test  * Flexible Sigmoidoscopy Age: 54-65 years old   Colonoscopy: every 10 years (may be performed more frequently if at higher risk)  OR  FOBT/FIT: every 1 year  OR  Cologuard: every 3 years OR  Sigmoidoscopy: every 5 years  Screening may be recommended earlier than age 48 if at higher risk for colorectal cancer  Also, an individualized decision between you and your healthcare provider will decide whether screening between the ages of 74-80 would be appropriate  Colonoscopy: 06/18/2018  FOBT/FIT: Not on file  Cologuard: Not on file  Sigmoidoscopy: Not on file    Screening Current     Breast Cancer Screening Age: 36 years old  Frequency: every 1-2 years  Not required if history of left and right mastectomy Mammogram: 01/27/2022    History Breast Cancer   Cervical Cancer Screening Between the ages of 21-29, pap smear recommended once every 3 years  Between the ages of 33-67, can perform pap smear with HPV co-testing every 5 years  Recommendations may differ for women with a history of total hysterectomy, cervical cancer, or abnormal pap smears in past  Pap Smear: Not on file    Screening Not Indicated   Hepatitis C Screening Once for adults born between 1945 and 1965  More frequently in patients at high risk for Hepatitis C Hep C Antibody: 02/20/2020    Screening Current   Diabetes Screening 1-2 times per year if you're at risk for diabetes or have pre-diabetes Fasting glucose: 102 mg/dL   A1C: 5 5 %    Screening Current   Cholesterol Screening Once every 5 years if you don't have a lipid disorder  May order more often based on risk factors  Lipid panel: 03/28/2022    Screening Not Indicated  History Lipid Disorder     Other Preventive Screenings Covered by Medicare:  1  Abdominal Aortic Aneurysm (AAA) Screening: covered once if your at risk  You're considered to be at risk if you have a family history of AAA    2  Lung Cancer Screening: covers low dose CT scan once per year if you meet all of the following conditions: (1) Age 50-69; (2) No signs or symptoms of lung cancer; (3) Current smoker or have quit smoking within the last 15 years; (4) You have a tobacco smoking history of at least 30 pack years (packs per day multiplied by number of years you smoked); (5) You get a written order from a healthcare provider  3  Glaucoma Screening: covered annually if you're considered high risk: (1) You have diabetes OR (2) Family history of glaucoma OR (3)  aged 48 and older OR (3)  American aged 72 and older  3  Osteoporosis Screening: covered every 2 years if you meet one of the following conditions: (1) You're estrogen deficient and at risk for osteoporosis based off medical history and other findings; (2) Have a vertebral abnormality; (3) On glucocorticoid therapy for more than 3 months; (4) Have primary hyperparathyroidism; (5) On osteoporosis medications and need to assess response to drug therapy  · Last bone density test (DXA Scan): 11/22/2021   5  HIV Screening: covered annually if you're between the age of 15-65  Also covered annually if you are younger than 13 and older than 72 with risk factors for HIV infection  For pregnant patients, it is covered up to 3 times per pregnancy  Immunizations:  Immunization Recommendations   Influenza Vaccine Annual influenza vaccination during flu season is recommended for all persons aged >= 6 months who do not have contraindications   Pneumococcal Vaccine (Prevnar and Pneumovax)  * Prevnar = PCV13  * Pneumovax = PPSV23   Adults 25-60 years old: 1-3 doses may be recommended based on certain risk factors  Adults 72 years old: Prevnar (PCV13) vaccine recommended followed by Pneumovax (PPSV23) vaccine  If already received PPSV23 since turning 65, then PCV13 recommended at least one year after PPSV23 dose  Hepatitis B Vaccine 3 dose series if at intermediate or high risk (ex: diabetes, end stage renal disease, liver disease)   Tetanus (Td) Vaccine - COST NOT COVERED BY MEDICARE PART B Following completion of primary series, a booster dose should be given every 10 years to maintain immunity against tetanus   Td may also be given as tetanus wound prophylaxis  Tdap Vaccine - COST NOT COVERED BY MEDICARE PART B Recommended at least once for all adults  For pregnant patients, recommended with each pregnancy  Shingles Vaccine (Shingrix) - COST NOT COVERED BY MEDICARE PART B  2 shot series recommended in those aged 48 and above     Health Maintenance Due:      Topic Date Due    Breast Cancer Screening: Mammogram  01/27/2023    Colorectal Cancer Screening  06/18/2023    DXA SCAN  11/22/2023    Hepatitis C Screening  Completed     Immunizations Due:      Topic Date Due    DTaP,Tdap,and Td Vaccines (1 - Tdap) 08[birth year]/1970    Influenza Vaccine (1) 09/01/2021     Advance Directives   What are advance directives? Advance directives are legal documents that state your wishes and plans for medical care  These plans are made ahead of time in case you lose your ability to make decisions for yourself  Advance directives can apply to any medical decision, such as the treatments you want, and if you want to donate organs  What are the types of advance directives? There are many types of advance directives, and each state has rules about how to use them  You may choose a combination of any of the following:  · Living will: This is a written record of the treatment you want  You can also choose which treatments you do not want, which to limit, and which to stop at a certain time  This includes surgery, medicine, IV fluid, and tube feedings  · Durable power of  for healthcare [facility]): This is a written record that states who you want to make healthcare choices for you when you are unable to make them for yourself  This person, called a proxy, is usually a family member or a friend  You may choose more than 1 proxy  · Do not resuscitate (DNR) order:  A DNR order is used in case your heart stops beating or you stop breathing  It is a request not to have certain forms of treatment, such as CPR   A DNR order may be included in other types of advance directives  · Medical directive: This covers the care that you want if you are in a coma, near death, or unable to make decisions for yourself  You can list the treatments you want for each condition  Treatment may include pain medicine, surgery, blood transfusions, dialysis, IV or tube feedings, and a ventilator (breathing machine)  · Values history: This document has questions about your views, beliefs, and how you feel and think about life  This information can help others choose the care that you would choose  Why are advance directives important? An advance directive helps you control your care  Although spoken wishes may be used, it is better to have your wishes written down  Spoken wishes can be misunderstood, or not followed  Treatments may be given even if you do not want them  An advance directive may make it easier for your family to make difficult choices about your care  Urinary Incontinence   Urinary incontinence (UI)  is when you lose control of your bladder  UI develops because your bladder cannot store or empty urine properly  The 3 most common types of UI are stress incontinence, urge incontinence, or both  Medicines:   · May be given to help strengthen your bladder control  Report any side effects of medication to your healthcare provider  Do pelvic muscle exercises often:  Your pelvic muscles help you stop urinating  Squeeze these muscles tight for 5 seconds, then relax for 5 seconds  Gradually work up to squeezing for 10 seconds  Do 3 sets of 15 repetitions a day, or as directed  This will help strengthen your pelvic muscles and improve bladder control  Train your bladder:  Go to the bathroom at set times, such as every 2 hours, even if you do not feel the urge to go  You can also try to hold your urine when you feel the urge to go  For example, hold your urine for 5 minutes when you feel the urge to go  As that becomes easier, hold your urine for 10 minutes     Self-care:   · Keep a UI record  Write down how often you leak urine and how much you leak  Make a note of what you were doing when you leaked urine  · Drink liquids as directed  You may need to limit the amount of liquid you drink to help control your urine leakage  Do not drink any liquid right before you go to bed  Limit or do not have drinks that contain caffeine or alcohol  · Prevent constipation  Eat a variety of high-fiber foods  Good examples are high-fiber cereals, beans, vegetables, and whole-grain breads  Walking is the best way to trigger your intestines to have a bowel movement  · Exercise regularly and maintain a healthy weight  Weight loss and exercise will decrease pressure on your bladder and help you control your leakage  · Use a catheter as directed  to help empty your bladder  A catheter is a tiny, plastic tube that is put into your bladder to drain your urine  · Go to behavior therapy as directed  Behavior therapy may be used to help you learn to control your urge to urinate  © Copyright [address] 2018 Information is for End User's use only and may not be sold, redistributed or otherwise used for commercial purposes  All illustrations and images included in CareNotes® are the copyrighted property of [employer]  or [employer]         Subjective:      Patient ID: Alex Zamora is a 66 y o  female    F/u mmp, awv and review labs   Feeling generally well, plans 5 week [place] Vac[place]  Mild covid in Dec    L hip pain dramatically improved after TKA  6/21  HTN-tolerating Valsartan, no recent home bp's  HPL-tolerating crestor   Walking tiw           Current Outpatient Medications:     Calcium 500 MG tablet, Take by mouth, Disp: , Rfl:     Progesterone Micronized 10 % CREA, Place on the skin daily , Disp: , Rfl:     risedronate (ACTONEL) 150 MG tablet, Take 1 tablet (150 mg total) by mouth every 30 (thirty) days with water on empty stomach, nothing by mouth or lie down for next 30 minutes  , Disp: 3 tablet, Rfl: 3    rosuvastatin (CRESTOR) 10 MG tablet, TAKE 1 TABLET BY MOUTH EVERY DAY, Disp: 90 tablet, Rfl: 1    valsartan (DIOVAN) 80 mg tablet, TAKE 1 TABLET BY MOUTH EVERY DAY, Disp: 90 tablet, Rfl: 0    Recent Results (from the past 1008 hour(s))   CBC and differential    Collection Time: 03/28/22  7:43 AM   Result Value Ref Range    WBC 5 93 4 31 - 10 16 Thousand/uL    RBC 4 72 3 81 - 5 12 Million/uL    Hemoglobin 13 8 11 5 - 15 4 g/dL    Hematocrit 43 2 34 8 - 46 1 %    MCV 92 82 - 98 fL    MCH 29 2 26 8 - 34 3 pg    MCHC 31 9 31 4 - 37 4 g/dL    RDW 13 5 11 6 - 15 1 %    MPV 11 0 8 9 - 12 7 fL    Platelets 629 741 - 040 Thousands/uL    nRBC 0 /100 WBCs    Neutrophils Relative 52 43 - 75 %    Immat GRANS % 0 0 - 2 %    Lymphocytes Relative 33 14 - 44 %    Monocytes Relative 10 4 - 12 %    Eosinophils Relative 4 0 - 6 %    Basophils Relative 1 0 - 1 %    Neutrophils Absolute 3 14 1 85 - 7 62 Thousands/µL    Immature Grans Absolute 0 01 0 00 - 0 20 Thousand/uL    Lymphocytes Absolute 1 94 0 60 - 4 47 Thousands/µL    Monocytes Absolute 0 57 0 17 - 1 22 Thousand/µL    Eosinophils Absolute 0 23 0 00 - 0 61 Thousand/µL    Basophils Absolute 0 04 0 00 - 0 10 Thousands/µL   Comprehensive metabolic panel    Collection Time: 03/28/22  7:43 AM   Result Value Ref Range    Sodium 139 136 - 145 mmol/L    Potassium 4 1 3 5 - 5 3 mmol/L    Chloride 105 100 - 108 mmol/L    CO2 32 21 - 32 mmol/L    ANION GAP 2 (L) 4 - 13 mmol/L    BUN 11 5 - 25 mg/dL    Creatinine 0 77 0 60 - 1 30 mg/dL    Glucose, Fasting 102 (H) 65 - 99 mg/dL    Calcium 9 5 8 3 - 10 1 mg/dL    AST 27 5 - 45 U/L    ALT 23 12 - 78 U/L    Alkaline Phosphatase 45 (L) 46 - 116 U/L    Total Protein 7 4 6 4 - 8 2 g/dL    Albumin 3 9 3 5 - 5 0 g/dL    Total Bilirubin 0 72 0 20 - 1 00 mg/dL    eGFR 77 ml/min/1 73sq m   Lipid panel    Collection Time: 03/28/22  7:43 AM   Result Value Ref Range    Cholesterol 198 See Comment mg/dL    Triglycerides 67 See Comment mg/dL    HDL, Direct 73 >=50 mg/dL    LDL Calculated 112 (H) 0 - 100 mg/dL    Non-HDL-Chol (CHOL-HDL) 125 mg/dl   HEMOGLOBIN A1C W/ EAG ESTIMATION    Collection Time: 03/28/22  7:43 AM   Result Value Ref Range    Hemoglobin A1C 5 5 Normal 3 8-5 6%; PreDiabetic 5 7-6 4%; Diabetic >=6 5%; Glycemic control for adults with diabetes <7 0% %     mg/dl   Vitamin D 25 hydroxy    Collection Time: 03/28/22  7:43 AM   Result Value Ref Range    Vit D, 25-Hydroxy 30 1 30 0 - 100 0 ng/mL       The following portions of the patient's history were reviewed and updated as appropriate: allergies, current medications, past family history, past medical history, past social history, past surgical history and problem list      Review of Systems   Constitutional: Negative for appetite change, chills, diaphoresis, fatigue, fever and unexpected weight change  HENT: Negative for congestion, hearing loss and rhinorrhea  Eyes: Negative for visual disturbance  Respiratory: Negative for cough, chest tightness, shortness of breath and wheezing  Cardiovascular: Negative for chest pain, palpitations and leg swelling  Gastrointestinal: Negative for abdominal pain and blood in stool  Endocrine: Negative for cold intolerance, heat intolerance, polydipsia and polyuria  Genitourinary: Negative for difficulty urinating, dysuria, frequency and urgency  Musculoskeletal: Negative for arthralgias and myalgias  Skin: Negative for rash  Neurological: Negative for dizziness, weakness, light-headedness and headaches  Hematological: Does not bruise/bleed easily  Psychiatric/Behavioral: Negative for dysphoric mood and sleep disturbance  Objective:      Vitals:    04/04/22 1226   BP: 130/80   Pulse: 69   Resp: 16   Temp: 97 9 °F (36 6 °C)   SpO2: 98%          Physical Exam  Constitutional:       Appearance: She is well-developed  HENT:      Head: Normocephalic and atraumatic        Nose: Nose normal    Eyes: General: No scleral icterus  Conjunctiva/sclera: Conjunctivae normal       Pupils: Pupils are equal, round, and reactive to light  Neck:      Thyroid: No thyromegaly  Vascular: No JVD  Trachea: No tracheal deviation  Cardiovascular:      Rate and Rhythm: Normal rate and regular rhythm  Heart sounds: Murmur heard  No friction rub  No gallop  Pulmonary:      Effort: Pulmonary effort is normal  No respiratory distress  Breath sounds: Normal breath sounds  No wheezing or rales  Musculoskeletal:         General: No deformity  Cervical back: Normal range of motion and neck supple  Lymphadenopathy:      Cervical: No cervical adenopathy  Skin:     General: Skin is warm and dry  Coloration: Skin is not pale  Findings: No erythema or rash  Neurological:      Mental Status: She is alert and oriented to person, place, and time  Cranial Nerves: No cranial nerve deficit  Psychiatric:         Behavior: Behavior normal          Thought Content:  Thought content normal          Judgment: Judgment normal

## 2022-04-04 NOTE — PROGRESS NOTES
Assessment and Plan:     Problem List Items Addressed This Visit     None           Preventive health issues were discussed with patient, and age appropriate screening tests were ordered as noted in patient's After Visit Summary  Personalized health advice and appropriate referrals for health education or preventive services given if needed, as noted in patient's After Visit Summary       History of Present Illness:     Patient presents for Medicare Annual Wellness visit    Patient Care Team:  Earlene Lal MD as PCP - Ronnell Velasquez MD as PCP - [address] [place], [place] [address] [facility]  [facility]  MD Darby Dobson MD     Problem List:     Patient Active Problem List   Diagnosis    Hyperlipidemia    Hypertension    Microhematuria    Personal history of breast cancer    Renal cyst    Thrombocytopenia ([facility] [address] )    Arthritis of left hip    Status post total hip replacement, left    Edema of left lower extremity    Screening mammogram for high-risk patient      Past Medical and Surgical History:     Past Medical History:   Diagnosis Date    Breast cancer ([facility] [address] ) 03/01/1993    right    Hematuria     LAST ASSESSED [id number]     Past Surgical History:   Procedure Laterality Date    BREAST LUMPECTOMY Right 04/01/1993    COLONOSCOPY      FL INJECTION LEFT HIP (NON ARTHROGRAM)  1/19/2021    LYMPH NODE BIOPSY      MOHS SURGERY      KS TOTAL HIP ARTHROPLASTY Left 6/3/2021    Procedure: ARTHROPLASTY HIP TOTAL;  Surgeon: Gita Saleh MD;  Location: [facility];  Service: Orthopedics      Family History:     Family History   Problem Relation Age of Onset    Breast cancer Mother 79    No Known Problems Sister     No Known Problems Daughter     No Known Problems Sister     No Known Problems Sister     No Known Problems Sister     No Known Problems Daughter       Social History:     Social History     Socioeconomic History    Marital status: /Civil [employer]     Spouse name: None    Number of children: None    Years of education: None    Highest education level: None   Occupational History    None   Tobacco Use    Smoking status: Never Smoker    Smokeless tobacco: Never Used   Vaping Use    Vaping Use: Never used   Substance and Sexual Activity    Alcohol use: Yes    Drug use: No    Sexual activity: Not Currently   Other Topics Concern    None   Social History Narrative    NO ADVANCED DIRECTIVES      Social Determinants of Health     Financial Resource Strain: Not on file   Food Insecurity: Not on file   Transportation Needs: Not on file   Physical Activity: Sufficiently Active    Days of Exercise per Week: 6 days    Minutes of Exercise per Session: 30 min   Stress: No Stress Concern Present    Feeling of Stress : Not at all   Social Connections: Not on file   Intimate Partner Violence: Not on file   Housing Stability: Not on file      Medications and Allergies:     Current Outpatient Medications   Medication Sig Dispense Refill    Calcium 500 MG tablet Take by mouth      Progesterone Micronized 10 % CREA Place on the skin daily       risedronate (ACTONEL) 150 MG tablet Take 1 tablet (150 mg total) by mouth every 30 (thirty) days with water on empty stomach, nothing by mouth or lie down for next 30 minutes  3 tablet 3    rosuvastatin (CRESTOR) 10 MG tablet TAKE 1 TABLET BY MOUTH EVERY DAY 90 tablet 1    valsartan (DIOVAN) 80 mg tablet TAKE 1 TABLET BY MOUTH EVERY DAY 90 tablet 0     No current facility-administered medications for this visit       No Known Allergies   Immunizations:     Immunization History   Administered Date(s) Administered    COVID-19 PFIZER VACCINE 0 3 ML IM 03/10/2021, 04/01/2021, 12/10/2021    INFLUENZA 12/17/2018, 10/22/2020    Influenza Split High Dose Preservative Free IM 12/17/2018    Influenza, seasonal, injectable 1950    Pneumococcal Conjugate 13-Valent 12/09/2015    Pneumococcal Polysaccharide PPV23 07/20/2017    Tdap 1950    Zoster 01/01/2012    Zoster Vaccine Recombinant 01/01/2012, 10/22/2020    influenza, trivalent, adjuvanted 11/12/2019      Health Maintenance:         Topic Date Due    Breast Cancer Screening: Mammogram  01/27/2023    Colorectal Cancer Screening  06/18/2023    DXA SCAN  11/22/2023    Hepatitis C Screening  Completed         Topic Date Due    DTaP,Tdap,and Td Vaccines (1 - Tdap) 08/14/1970    Influenza Vaccine (1) 09/01/2021      Medicare Health Risk Assessment:     /80 (BP Location: Left arm, Patient Position: Sitting, Cuff Size: Standard)   Pulse 69   Temp 97 9 °F (36 6 °C) (Tympanic)   Resp 16   Ht 5' 2" (1 575 m)   Wt 57 3 kg (126 lb 6 4 oz)   SpO2 98%   BMI 23 12 kg/m²      Cinthia Campbell is here for her Subsequent Wellness visit  Health Risk Assessment:   Patient rates overall health as very good  Patient feels that their physical health rating is slightly better  Patient is satisfied with their life  Eyesight was rated as slightly worse  Hearing was rated as same  Patient feels that their emotional and mental health rating is same  Patients states they are never, rarely angry  Patient states they are never, rarely unusually tired/fatigued  Pain experienced in the last 7 days has been none  Patient states that she has experienced no weight loss or gain in last 6 months  Depression Screening:   PHQ-2 Score: 0      Fall Risk Screening: In the past year, patient has experienced: no history of falling in past year      Urinary Incontinence Screening:   Patient has leaked urine accidently in the last six months  Home Safety:  Patient does not have trouble with stairs inside or outside of their home  Patient has working smoke alarms and has no working carbon monoxide detector  Home safety hazards include: loose rugs on the floor  Nutrition:   Current diet is Regular       Medications:   Patient is currently taking over-the-counter supplements  OTC medications include: see medication list  Patient is able to manage medications  Activities of Daily Living (ADLs)/Instrumental Activities of Daily Living (IADLs):   Walk and transfer into and out of bed and chair?: Yes  Dress and groom yourself?: Yes    Bathe or shower yourself?: Yes    Feed yourself? Yes  Do your laundry/housekeeping?: Yes  Manage your money, pay your bills and track your expenses?: Yes  Make your own meals?: Yes    Do your own shopping?: Yes    Previous Hospitalizations:   Any hospitalizations or ED visits within the last 12 months?: Yes    How many hospitalizations have you had in the last year?: 1-2    Advance Care Planning:   Living will: Yes    Advanced directive: Yes      Cognitive Screening:   Provider or family/friend/caregiver concerned regarding cognition?: No    PREVENTIVE SCREENINGS      Cardiovascular Screening:    General: Screening Not Indicated and History Lipid Disorder      Diabetes Screening:     General: Screening Current      Colorectal Cancer Screening:     General: Screening Current      Breast Cancer Screening:     General: History Breast Cancer      Cervical Cancer Screening:    General: Screening Not Indicated      Osteoporosis Screening:    General: Screening Not Indicated and History Osteoporosis      Abdominal Aortic Aneurysm (AAA) Screening:        General: Screening Not Indicated      Lung Cancer Screening:     General: Screening Not Indicated      Hepatitis C Screening:    General: Screening Current    Screening, Brief Intervention, and Referral to Treatment (SBIRT)    Screening  Typical number of drinks in a day: 1  Typical number of drinks in a week: 7  Interpretation: Low risk drinking behavior      Single Item Drug Screening:  How often have you used an illegal drug (including marijuana) or a prescription medication for non-medical reasons in the past year? never    Single Item Drug Screen Score: 0  Interpretation: Negative screen for possible drug use disorder      Maggi Delgadillo MD

## 2022-04-04 NOTE — PATIENT INSTRUCTIONS
Lab data reviewed in detail and compared prior    Hypertension stable on present regimen    Hyperlipidemia improved on rosuvastatin    Impaired fasting glucose stable with A1c 5 5    History of microscopic hematuria-negative urology workup in December 2016, recent urinalysis with no red blood cells  No further workup  Bosniak 2 F renal cyst remained stable over 3 year surveillance, no further workup  Osteoporosis-continue on risedronate    Health maintenance is up-to-date, we discussed 2nd COVID booster, timing should be ideally before end of June  Medicare Preventive Visit Patient Instructions  Thank you for completing your Welcome to Medicare Visit or Medicare Annual Wellness Visit today  Your next wellness visit will be due in one year (4/5/2023)  The screening/preventive services that you may require over the next 5-10 years are detailed below  Some tests may not apply to you based off risk factors and/or age  Screening tests ordered at today's visit but not completed yet may show as past due  Also, please note that scanned in results may not display below  Preventive Screenings:  Service Recommendations Previous Testing/Comments   Colorectal Cancer Screening  * Colonoscopy    * Fecal Occult Blood Test (FOBT)/Fecal Immunochemical Test (FIT)  * Fecal DNA/Cologuard Test  * Flexible Sigmoidoscopy Age: 54-65 years old   Colonoscopy: every 10 years (may be performed more frequently if at higher risk)  OR  FOBT/FIT: every 1 year  OR  Cologuard: every 3 years  OR  Sigmoidoscopy: every 5 years  Screening may be recommended earlier than age 48 if at higher risk for colorectal cancer  Also, an individualized decision between you and your healthcare provider will decide whether screening between the ages of 74-80 would be appropriate   Colonoscopy: 06/18/2018  FOBT/FIT: Not on file  Cologuard: Not on file  Sigmoidoscopy: Not on file    Screening Current     Breast Cancer Screening Age: 40+ years old  Frequency: every 1-2 years  Not required if history of left and right mastectomy Mammogram: 01/27/2022    History Breast Cancer   Cervical Cancer Screening Between the ages of 21-29, pap smear recommended once every 3 years  Between the ages of 33-67, can perform pap smear with HPV co-testing every 5 years  Recommendations may differ for women with a history of total hysterectomy, cervical cancer, or abnormal pap smears in past  Pap Smear: Not on file    Screening Not Indicated   Hepatitis C Screening Once for adults born between 1945 and 1965  More frequently in patients at high risk for Hepatitis C Hep C Antibody: 02/20/2020    Screening Current   Diabetes Screening 1-2 times per year if you're at risk for diabetes or have pre-diabetes Fasting glucose: 102 mg/dL   A1C: 5 5 %    Screening Current   Cholesterol Screening Once every 5 years if you don't have a lipid disorder  May order more often based on risk factors  Lipid panel: 03/28/2022    Screening Not Indicated  History Lipid Disorder     Other Preventive Screenings Covered by Medicare:  1  Abdominal Aortic Aneurysm (AAA) Screening: covered once if your at risk  You're considered to be at risk if you have a family history of AAA  2  Lung Cancer Screening: covers low dose CT scan once per year if you meet all of the following conditions: (1) Age 50-69; (2) No signs or symptoms of lung cancer; (3) Current smoker or have quit smoking within the last 15 years; (4) You have a tobacco smoking history of at least 30 pack years (packs per day multiplied by number of years you smoked); (5) You get a written order from a healthcare provider  3  Glaucoma Screening: covered annually if you're considered high risk: (1) You have diabetes OR (2) Family history of glaucoma OR (3)  aged 48 and older OR (3)  American aged 72 and older  3   Osteoporosis Screening: covered every 2 years if you meet one of the following conditions: (1) You're estrogen deficient and at risk for osteoporosis based off medical history and other findings; (2) Have a vertebral abnormality; (3) On glucocorticoid therapy for more than 3 months; (4) Have primary hyperparathyroidism; (5) On osteoporosis medications and need to assess response to drug therapy  · Last bone density test (DXA Scan): 11/22/2021   5  HIV Screening: covered annually if you're between the age of 15-65  Also covered annually if you are younger than 13 and older than 72 with risk factors for HIV infection  For pregnant patients, it is covered up to 3 times per pregnancy  Immunizations:  Immunization Recommendations   Influenza Vaccine Annual influenza vaccination during flu season is recommended for all persons aged >= 6 months who do not have contraindications   Pneumococcal Vaccine (Prevnar and Pneumovax)  * Prevnar = PCV13  * Pneumovax = PPSV23   Adults 25-60 years old: 1-3 doses may be recommended based on certain risk factors  Adults 72 years old: Prevnar (PCV13) vaccine recommended followed by Pneumovax (PPSV23) vaccine  If already received PPSV23 since turning 65, then PCV13 recommended at least one year after PPSV23 dose  Hepatitis B Vaccine 3 dose series if at intermediate or high risk (ex: diabetes, end stage renal disease, liver disease)   Tetanus (Td) Vaccine - COST NOT COVERED BY MEDICARE PART B Following completion of primary series, a booster dose should be given every 10 years to maintain immunity against tetanus  Td may also be given as tetanus wound prophylaxis  Tdap Vaccine - COST NOT COVERED BY MEDICARE PART B Recommended at least once for all adults  For pregnant patients, recommended with each pregnancy     Shingles Vaccine (Shingrix) - COST NOT COVERED BY MEDICARE PART B  2 shot series recommended in those aged 48 and above     Health Maintenance Due:      Topic Date Due    Breast Cancer Screening: Mammogram  01/27/2023    Colorectal Cancer Screening  06/18/2023    DXA SCAN  11/22/2023    Hepatitis C Screening  Completed     Immunizations Due:      Topic Date Due    DTaP,Tdap,and Td Vaccines (1 - Tdap) 08/14/1970    Influenza Vaccine (1) 09/01/2021     Advance Directives   What are advance directives? Advance directives are legal documents that state your wishes and plans for medical care  These plans are made ahead of time in case you lose your ability to make decisions for yourself  Advance directives can apply to any medical decision, such as the treatments you want, and if you want to donate organs  What are the types of advance directives? There are many types of advance directives, and each state has rules about how to use them  You may choose a combination of any of the following:  · Living will: This is a written record of the treatment you want  You can also choose which treatments you do not want, which to limit, and which to stop at a certain time  This includes surgery, medicine, IV fluid, and tube feedings  · Durable power of  for healthcare [facility]): This is a written record that states who you want to make healthcare choices for you when you are unable to make them for yourself  This person, called a proxy, is usually a family member or a friend  You may choose more than 1 proxy  · Do not resuscitate (DNR) order:  A DNR order is used in case your heart stops beating or you stop breathing  It is a request not to have certain forms of treatment, such as CPR  A DNR order may be included in other types of advance directives  · Medical directive: This covers the care that you want if you are in a coma, near death, or unable to make decisions for yourself  You can list the treatments you want for each condition  Treatment may include pain medicine, surgery, blood transfusions, dialysis, IV or tube feedings, and a ventilator (breathing machine)  · Values history:   This document has questions about your views, beliefs, and how you feel and think about life  This information can help others choose the care that you would choose  Why are advance directives important? An advance directive helps you control your care  Although spoken wishes may be used, it is better to have your wishes written down  Spoken wishes can be misunderstood, or not followed  Treatments may be given even if you do not want them  An advance directive may make it easier for your family to make difficult choices about your care  Urinary Incontinence   Urinary incontinence (UI)  is when you lose control of your bladder  UI develops because your bladder cannot store or empty urine properly  The 3 most common types of UI are stress incontinence, urge incontinence, or both  Medicines:   · May be given to help strengthen your bladder control  Report any side effects of medication to your healthcare provider  Do pelvic muscle exercises often:  Your pelvic muscles help you stop urinating  Squeeze these muscles tight for 5 seconds, then relax for 5 seconds  Gradually work up to squeezing for 10 seconds  Do 3 sets of 15 repetitions a day, or as directed  This will help strengthen your pelvic muscles and improve bladder control  Train your bladder:  Go to the bathroom at set times, such as every 2 hours, even if you do not feel the urge to go  You can also try to hold your urine when you feel the urge to go  For example, hold your urine for 5 minutes when you feel the urge to go  As that becomes easier, hold your urine for 10 minutes  Self-care:   · Keep a UI record  Write down how often you leak urine and how much you leak  Make a note of what you were doing when you leaked urine  · Drink liquids as directed  You may need to limit the amount of liquid you drink to help control your urine leakage  Do not drink any liquid right before you go to bed  Limit or do not have drinks that contain caffeine or alcohol  · Prevent constipation  Eat a variety of high-fiber foods   Good examples are high-fiber cereals, beans, vegetables, and whole-grain breads  Walking is the best way to trigger your intestines to have a bowel movement  · Exercise regularly and maintain a healthy weight  Weight loss and exercise will decrease pressure on your bladder and help you control your leakage  · Use a catheter as directed  to help empty your bladder  A catheter is a tiny, plastic tube that is put into your bladder to drain your urine  · Go to behavior therapy as directed  Behavior therapy may be used to help you learn to control your urge to urinate  © Copyright [employer] 2018 Information is for End User's use only and may not be sold, redistributed or otherwise used for commercial purposes   All illustrations and images included in CareNotes® are the copyrighted property of A D A M , Inc  or [address] [place] [address]

## 2022-06-09 ENCOUNTER — OFFICE VISIT (OUTPATIENT)
Dept: INTERNAL MEDICINE CLINIC | Facility: CLINIC | Age: 72
End: 2022-06-09
Payer: COMMERCIAL

## 2022-06-09 VITALS
SYSTOLIC BLOOD PRESSURE: 122 MMHG | WEIGHT: 124.8 LBS | DIASTOLIC BLOOD PRESSURE: 88 MMHG | TEMPERATURE: 98.5 F | BODY MASS INDEX: 22.97 KG/M2 | RESPIRATION RATE: 16 BRPM | HEIGHT: 62 IN | HEART RATE: 74 BPM

## 2022-06-09 DIAGNOSIS — L03.113 CELLULITIS OF RIGHT HAND: ICD-10-CM

## 2022-06-09 DIAGNOSIS — L03.113 CELLULITIS OF RIGHT HAND: Primary | ICD-10-CM

## 2022-06-09 PROCEDURE — 3079F DIAST BP 80-89 MM HG: CPT | Performed by: INTERNAL MEDICINE

## 2022-06-09 PROCEDURE — 3074F SYST BP LT 130 MM HG: CPT | Performed by: INTERNAL MEDICINE

## 2022-06-09 PROCEDURE — 99213 OFFICE O/P EST LOW 20 MIN: CPT | Performed by: INTERNAL MEDICINE

## 2022-06-09 RX ORDER — CEPHALEXIN 500 MG/1
500 CAPSULE ORAL 4 TIMES DAILY
Qty: 28 CAPSULE | Refills: 0 | Status: SHIPPED | OUTPATIENT
Start: 2022-06-09 | End: 2022-06-16

## 2022-06-09 RX ORDER — CEPHALEXIN 500 MG/1
500 CAPSULE ORAL 4 TIMES DAILY
Qty: 28 CAPSULE | Refills: 0 | Status: SHIPPED | OUTPATIENT
Start: 2022-06-09 | End: 2022-06-09 | Stop reason: SDUPTHER

## 2022-06-09 NOTE — PROGRESS NOTES
Assessment/Plan:    Diagnoses and all orders for this visit:    Cellulitis of right hand  -     cephalexin (KEFLEX) 500 mg capsule; Take 1 capsule (500 mg total) by mouth 4 (four) times a day for 7 days            Patient Instructions   Cellulitis of hand likely related to bug bite  Will start Keflex 500 mg 4 times daily  Eat yogurt or take probiotic to avoid diarrhea  Elevate hand above heart as much as possible at least several hours per day and overnight  Follow serial exams or pictures to assure there is improvement  Get a compression glove and where that 24 hours per day  Contact me or go to emergency department for worsening signs of infection, fever or lymphangitis as discussed  Subjective:      Patient ID: Dora Marsh is a 66 y o  female    Patient presents acutely with concern for infection related to bug bite on her hand  She noted feeling a scratchy sensation under her ring while gardening approximately 1 week ago so she wash the hand and removed the ring but continued to note a abnormal sensation between her fingers  Several days later she had a routine dermatology appointment and brought it to the dermatologist attention and was told it looked like a bug bite that was not infected  Over the last 4 days the hand has continued to swell and has become red  There has been no streaking redness up the arm, no fevers, no chills, minimal pain and minimal itching  She does not recall being bitten or stung but just felt an odd sensation under her ring          Current Outpatient Medications:     Calcium 500 MG tablet, Take by mouth, Disp: , Rfl:     cephalexin (KEFLEX) 500 mg capsule, Take 1 capsule (500 mg total) by mouth 4 (four) times a day for 7 days, Disp: 28 capsule, Rfl: 0    Progesterone Micronized 10 % CREA, Place on the skin daily , Disp: , Rfl:     risedronate (ACTONEL) 150 MG tablet, Take 1 tablet (150 mg total) by mouth every 30 (thirty) days with water on empty stomach, nothing by mouth or lie down for next 30 minutes  , Disp: 3 tablet, Rfl: 3    rosuvastatin (CRESTOR) 10 MG tablet, TAKE 1 TABLET BY MOUTH EVERY DAY, Disp: 90 tablet, Rfl: 1    valsartan (DIOVAN) 80 mg tablet, TAKE 1 TABLET BY MOUTH EVERY DAY, Disp: 90 tablet, Rfl: 0    No results found for this or any previous visit (from the past 1008 hour(s))  The following portions of the patient's history were reviewed and updated as appropriate: allergies, current medications, past family history, past medical history, past social history, past surgical history and problem list      Review of Systems   Constitutional: Negative for appetite change, chills, diaphoresis, fatigue, fever and unexpected weight change  HENT: Negative for congestion, hearing loss and rhinorrhea  Eyes: Negative for visual disturbance  Respiratory: Negative for cough, chest tightness, shortness of breath and wheezing  Cardiovascular: Negative for chest pain, palpitations and leg swelling  Gastrointestinal: Negative for abdominal pain and blood in stool  Endocrine: Negative for cold intolerance, heat intolerance, polydipsia and polyuria  Genitourinary: Negative for difficulty urinating, dysuria, frequency and urgency  Musculoskeletal: Negative for arthralgias and myalgias  Skin: Positive for wound  Negative for rash  Neurological: Negative for dizziness, weakness, light-headedness and headaches  Hematological: Does not bruise/bleed easily  Psychiatric/Behavioral: Negative for dysphoric mood and sleep disturbance  Objective:      Vitals:    06/09/22 1029   BP: 122/88   Pulse: 74   Resp: 16   Temp: 98 5 °F (36 9 °C)          Physical Exam  Constitutional:       Appearance: She is well-developed  HENT:      Head: Normocephalic and atraumatic     Pulmonary:      Effort: Pulmonary effort is normal    Skin:     Comments: Right hand is notably swollen from mid metacarpal to distal finger tip predominantly 3 and 4 but also in 1 and 5   There is a tiny break in the skin consistent with bug bite on the dorsal aspect between middle and ring finger with no evidence of retained foreign body  Refer to picture  Neurological:      Mental Status: She is alert and oriented to person, place, and time  Psychiatric:         Behavior: Behavior normal  Behavior is cooperative  Thought Content:  Thought content normal          Judgment: Judgment normal

## 2022-06-09 NOTE — TELEPHONE ENCOUNTER
Was just seen, waiting for cephalexin (KEFLEX) 500 mg capsule   To be sent to the pharmacy     # [phone]

## 2022-06-09 NOTE — PATIENT INSTRUCTIONS
Cellulitis of hand likely related to bug bite  Will start Keflex 500 mg 4 times daily  Eat yogurt or take probiotic to avoid diarrhea  Elevate hand above heart as much as possible at least several hours per day and overnight  Follow serial exams or pictures to assure there is improvement  Get a compression glove and where that 24 hours per day  Contact me or go to emergency department for worsening signs of infection, fever or lymphangitis as discussed

## 2022-06-10 ENCOUNTER — APPOINTMENT (OUTPATIENT)
Dept: RADIOLOGY | Facility: MEDICAL CENTER | Age: 72
End: 2022-06-10
Payer: COMMERCIAL

## 2022-06-10 ENCOUNTER — OFFICE VISIT (OUTPATIENT)
Dept: OBGYN CLINIC | Facility: MEDICAL CENTER | Age: 72
End: 2022-06-10
Payer: COMMERCIAL

## 2022-06-10 VITALS
WEIGHT: 123 LBS | HEIGHT: 62 IN | DIASTOLIC BLOOD PRESSURE: 74 MMHG | HEART RATE: 79 BPM | BODY MASS INDEX: 22.63 KG/M2 | SYSTOLIC BLOOD PRESSURE: 113 MMHG

## 2022-06-10 DIAGNOSIS — Z47.1 AFTERCARE FOLLOWING LEFT HIP JOINT REPLACEMENT SURGERY: Primary | ICD-10-CM

## 2022-06-10 DIAGNOSIS — Z96.642 AFTERCARE FOLLOWING LEFT HIP JOINT REPLACEMENT SURGERY: Primary | ICD-10-CM

## 2022-06-10 DIAGNOSIS — M16.12 ARTHRITIS OF LEFT HIP: ICD-10-CM

## 2022-06-10 PROCEDURE — 1036F TOBACCO NON-USER: CPT | Performed by: ORTHOPAEDIC SURGERY

## 2022-06-10 PROCEDURE — 1160F RVW MEDS BY RX/DR IN RCRD: CPT | Performed by: ORTHOPAEDIC SURGERY

## 2022-06-10 PROCEDURE — 99213 OFFICE O/P EST LOW 20 MIN: CPT | Performed by: ORTHOPAEDIC SURGERY

## 2022-06-10 PROCEDURE — 73502 X-RAY EXAM HIP UNI 2-3 VIEWS: CPT

## 2022-06-10 PROCEDURE — 3008F BODY MASS INDEX DOCD: CPT | Performed by: ORTHOPAEDIC SURGERY

## 2022-06-10 NOTE — PROGRESS NOTES
Assessment:   Diagnosis ICD-10-CM Associated Orders   1  Aftercare following left hip joint replacement surgery  Z47 1 XR hip/pelv 2-3 vws left if performed    Z96 [id number]        Plan:  79-year-old female now 1 year status post left total hip arthroplasty performed 06/03/2021  She is overall doing well but still notices weakness in the left lower extremity when compared to the right, she does have mild quadriceps atrophy on exam today  I would recommend she continue with her home exercises and continue to work on strengthening that leg  Also reiterated that she needs dental antibiotic prophylaxis for the next 1 year  I will plan to see her back as needed or should problems arise  To do next visit:  Return if symptoms worsen or fail to improve  The above stated was discussed in layman's terms and the patient expressed understanding  All questions were answered to the patient's satisfaction  Scribe Attestation    I,:  Garrison Breath am acting as a scribe while in the presence of the attending physician :       I,:  Neelam May MD personally performed the services described in this documentation    as scribed in my presence :             Subjective:   Keven Delgadillo is a 66 y o  female who presents for 1 year follow-up of left total hip arthroplasty performed 6/3/2021  She has noticed significant pain relief over time from this surgery and is overall please with the outcome  She does still notice that when carrying heavy things and walking up stairs she has some residual weakness in the left leg when compared to the right  She is still doing home exercises and has noticed little improvement  Denies any lower back pain, numbness or tingling  Review of systems negative unless otherwise specified in HPI  Review of Systems   Constitutional: Negative for appetite change and unexpected weight change  HENT: Negative for congestion and trouble swallowing  Eyes: Negative for visual disturbance  Respiratory: Negative for cough and shortness of breath  Cardiovascular: Negative for chest pain and palpitations  Gastrointestinal: Negative for nausea and vomiting  Endocrine: Negative for cold intolerance and heat intolerance  Musculoskeletal: Negative for gait problem and myalgias  Skin: Negative for rash  Neurological: Negative for numbness  Past Medical History:   Diagnosis Date    Breast cancer ([facility] [address] ) 03/01/1993    right    Hematuria     LAST ASSESSED [id number]       Past Surgical History:   Procedure Laterality Date    BREAST LUMPECTOMY Right 04/01/1993    COLONOSCOPY      FL INJECTION LEFT HIP (NON ARTHROGRAM)  1/19/2021    LYMPH NODE BIOPSY      MOHS SURGERY      AL TOTAL HIP ARTHROPLASTY Left 6/3/2021    Procedure: ARTHROPLASTY HIP TOTAL;  Surgeon: Jennifer Zamarripa MD;  Location: [facility];  Service: Orthopedics       Family History   Problem Relation Age of Onset    Breast cancer Mother 79    No Known Problems Sister     No Known Problems Daughter     No Known Problems Sister     No Known Problems Sister     No Known Problems Sister     No Known Problems Daughter        Social History     Occupational History    Not on file   Tobacco Use    Smoking status: Never Smoker    Smokeless tobacco: Never Used   Vaping Use    Vaping Use: Never used   Substance and Sexual Activity    Alcohol use: Yes    Drug use: No    Sexual activity: Not Currently         Current Outpatient Medications:     Calcium 500 MG tablet, Take by mouth, Disp: , Rfl:     cephalexin (KEFLEX) 500 mg capsule, Take 1 capsule (500 mg total) by mouth 4 (four) times a day for 7 days, Disp: 28 capsule, Rfl: 0    Progesterone Micronized 10 % CREA, Place on the skin daily , Disp: , Rfl:     risedronate (ACTONEL) 150 MG tablet, Take 1 tablet (150 mg total) by mouth every 30 (thirty) days with water on empty stomach, nothing by mouth or lie down for next 30 minutes  , Disp: 3 tablet, Rfl: 3   rosuvastatin (CRESTOR) 10 MG tablet, TAKE 1 TABLET BY MOUTH EVERY DAY, Disp: 90 tablet, Rfl: 1    valsartan (DIOVAN) 80 mg tablet, TAKE 1 TABLET BY MOUTH EVERY DAY, Disp: 90 tablet, Rfl: 0    No Known Allergies         Vitals:    06/10/22 1336   BP: 113/74   Pulse: 79       Objective:                    Left Hip Exam     Tenderness   The patient is experiencing no tenderness  Range of Motion   The patient has normal left hip ROM  Other   Erythema: absent  Sensation: normal  Pulse: present    Comments:  Leg lengths grossly equal  Well healed incision  No warmth to palpation  Mild quadriceps atrophy noted          Diagnostics, reviewed and taken today if performed as documented: The attending physician has personally reviewed the pertinent films in PACS and interpretation is as follows: X-rays of the left hip performed today demonstrate stable well positioned total hip arthroplasty prosthesis with no signs of loosening or failure  Mild scoliotic curvature noted  Procedures, if performed today:    None performed      Portions of the record may have been created with voice recognition software  Occasional wrong word or "sound a like" substitutions may have occurred due to the inherent limitations of voice recognition software  Read the chart carefully and recognize, using context, where substitutions have occurred

## 2022-06-27 DIAGNOSIS — I10 ESSENTIAL HYPERTENSION: ICD-10-CM

## 2022-06-27 RX ORDER — VALSARTAN 80 MG/1
TABLET ORAL
Qty: 90 TABLET | Refills: 0 | Status: SHIPPED | OUTPATIENT
Start: 2022-06-27

## 2022-07-26 ENCOUNTER — APPOINTMENT (OUTPATIENT)
Dept: LAB | Facility: CLINIC | Age: 72
End: 2022-07-26
Payer: COMMERCIAL

## 2022-07-26 ENCOUNTER — OFFICE VISIT (OUTPATIENT)
Dept: INTERNAL MEDICINE CLINIC | Facility: CLINIC | Age: 72
End: 2022-07-26
Payer: COMMERCIAL

## 2022-07-26 VITALS
HEART RATE: 82 BPM | RESPIRATION RATE: 16 BRPM | TEMPERATURE: 99.3 F | SYSTOLIC BLOOD PRESSURE: 132 MMHG | HEIGHT: 62 IN | OXYGEN SATURATION: 98 % | WEIGHT: 123.2 LBS | BODY MASS INDEX: 22.67 KG/M2 | DIASTOLIC BLOOD PRESSURE: 72 MMHG

## 2022-07-26 DIAGNOSIS — Z85.3 PERSONAL HISTORY OF BREAST CANCER: ICD-10-CM

## 2022-07-26 DIAGNOSIS — M79.10 MYALGIA: Primary | ICD-10-CM

## 2022-07-26 DIAGNOSIS — M31.6 TEMPORAL ARTERITIS (HCC): Primary | ICD-10-CM

## 2022-07-26 DIAGNOSIS — M31.6 TEMPORAL ARTERITIS (HCC): ICD-10-CM

## 2022-07-26 DIAGNOSIS — M79.10 MYALGIA: ICD-10-CM

## 2022-07-26 DIAGNOSIS — R68.84 JAW PAIN: ICD-10-CM

## 2022-07-26 LAB
ALBUMIN SERPL BCP-MCNC: 3.8 G/DL (ref 3.5–5)
ALP SERPL-CCNC: 57 U/L (ref 46–116)
ALT SERPL W P-5'-P-CCNC: 21 U/L (ref 12–78)
ANION GAP SERPL CALCULATED.3IONS-SCNC: 3 MMOL/L (ref 4–13)
AST SERPL W P-5'-P-CCNC: 27 U/L (ref 5–45)
BASOPHILS # BLD AUTO: 0.02 THOUSANDS/ΜL (ref 0–0.1)
BASOPHILS NFR BLD AUTO: 0 % (ref 0–1)
BILIRUB SERPL-MCNC: 0.34 MG/DL (ref 0.2–1)
BUN SERPL-MCNC: 10 MG/DL (ref 5–25)
CALCIUM SERPL-MCNC: 9.3 MG/DL (ref 8.3–10.1)
CHLORIDE SERPL-SCNC: 105 MMOL/L (ref 96–108)
CK SERPL-CCNC: 84 U/L (ref 26–192)
CO2 SERPL-SCNC: 29 MMOL/L (ref 21–32)
CREAT SERPL-MCNC: 0.68 MG/DL (ref 0.6–1.3)
CRP SERPL QL: 10.1 MG/L
EOSINOPHIL # BLD AUTO: 0.13 THOUSAND/ΜL (ref 0–0.61)
EOSINOPHIL NFR BLD AUTO: 2 % (ref 0–6)
ERYTHROCYTE [DISTWIDTH] IN BLOOD BY AUTOMATED COUNT: 13.2 % (ref 11.6–15.1)
ERYTHROCYTE [SEDIMENTATION RATE] IN BLOOD: 61 MM/HOUR (ref 0–29)
GFR SERPL CREATININE-BSD FRML MDRD: 87 ML/MIN/1.73SQ M
GLUCOSE P FAST SERPL-MCNC: 92 MG/DL (ref 65–99)
HCT VFR BLD AUTO: 43 % (ref 34.8–46.1)
HGB BLD-MCNC: 13.5 G/DL (ref 11.5–15.4)
IMM GRANULOCYTES # BLD AUTO: 0.02 THOUSAND/UL (ref 0–0.2)
IMM GRANULOCYTES NFR BLD AUTO: 0 % (ref 0–2)
LYMPHOCYTES # BLD AUTO: 1.51 THOUSANDS/ΜL (ref 0.6–4.47)
LYMPHOCYTES NFR BLD AUTO: 20 % (ref 14–44)
MCH RBC QN AUTO: 29 PG (ref 26.8–34.3)
MCHC RBC AUTO-ENTMCNC: 31.4 G/DL (ref 31.4–37.4)
MCV RBC AUTO: 92 FL (ref 82–98)
MONOCYTES # BLD AUTO: 0.48 THOUSAND/ΜL (ref 0.17–1.22)
MONOCYTES NFR BLD AUTO: 6 % (ref 4–12)
NEUTROPHILS # BLD AUTO: 5.29 THOUSANDS/ΜL (ref 1.85–7.62)
NEUTS SEG NFR BLD AUTO: 72 % (ref 43–75)
NRBC BLD AUTO-RTO: 0 /100 WBCS
PLATELET # BLD AUTO: 296 THOUSANDS/UL (ref 149–390)
PMV BLD AUTO: 10.6 FL (ref 8.9–12.7)
POTASSIUM SERPL-SCNC: 4.3 MMOL/L (ref 3.5–5.3)
PROT SERPL-MCNC: 7.9 G/DL (ref 6.4–8.4)
RBC # BLD AUTO: 4.66 MILLION/UL (ref 3.81–5.12)
SODIUM SERPL-SCNC: 137 MMOL/L (ref 135–147)
TSH SERPL DL<=0.05 MIU/L-ACNC: 1.88 UIU/ML (ref 0.45–4.5)
WBC # BLD AUTO: 7.45 THOUSAND/UL (ref 4.31–10.16)

## 2022-07-26 PROCEDURE — 86140 C-REACTIVE PROTEIN: CPT

## 2022-07-26 PROCEDURE — 86617 LYME DISEASE ANTIBODY: CPT

## 2022-07-26 PROCEDURE — 93000 ELECTROCARDIOGRAM COMPLETE: CPT | Performed by: INTERNAL MEDICINE

## 2022-07-26 PROCEDURE — 85025 COMPLETE CBC W/AUTO DIFF WBC: CPT

## 2022-07-26 PROCEDURE — 84443 ASSAY THYROID STIM HORMONE: CPT

## 2022-07-26 PROCEDURE — 85652 RBC SED RATE AUTOMATED: CPT

## 2022-07-26 PROCEDURE — 80053 COMPREHEN METABOLIC PANEL: CPT

## 2022-07-26 PROCEDURE — 82550 ASSAY OF CK (CPK): CPT

## 2022-07-26 PROCEDURE — 99214 OFFICE O/P EST MOD 30 MIN: CPT | Performed by: INTERNAL MEDICINE

## 2022-07-26 PROCEDURE — 3725F SCREEN DEPRESSION PERFORMED: CPT | Performed by: INTERNAL MEDICINE

## 2022-07-26 PROCEDURE — 3078F DIAST BP <80 MM HG: CPT | Performed by: INTERNAL MEDICINE

## 2022-07-26 PROCEDURE — 86038 ANTINUCLEAR ANTIBODIES: CPT

## 2022-07-26 PROCEDURE — 86618 LYME DISEASE ANTIBODY: CPT

## 2022-07-26 PROCEDURE — 3075F SYST BP GE 130 - 139MM HG: CPT | Performed by: INTERNAL MEDICINE

## 2022-07-26 PROCEDURE — 86430 RHEUMATOID FACTOR TEST QUAL: CPT

## 2022-07-26 PROCEDURE — 36415 COLL VENOUS BLD VENIPUNCTURE: CPT

## 2022-07-26 RX ORDER — PREDNISONE 20 MG/1
60 TABLET ORAL DAILY
Qty: 90 TABLET | Refills: 3 | Status: SHIPPED | OUTPATIENT
Start: 2022-07-26 | End: 2022-12-05

## 2022-07-26 NOTE — PATIENT INSTRUCTIONS
Myalgia- associated with pain and stiffness initiating in the right shoulder, migrate into involved bilateral shoulders and neck  It is associated with jaw pain  She denies any visual changes  She does report any myalgias any other locations  She also denies any fever, chills, night sweats or weight loss  Will evaluate for Endocrine, Infectious and rheumatological disorders with ESR, CRP, RF, WIN, TSH and Lyme serology  Will obtain CBC and CMP  If inflammatory markers are elevated, will evaluate for the differential diagnosis of temporal arteritis with temporal artery biopsy, and initiate high-dose steroids  Jaw pain- there is association with the above findings, however given her medical history significant for dyslipidemia, hypertension and history of breast cancer, will rule out coronary artery disease as the initiating factor for her jaw pain  Will obtain EKG in the office  Hyperlipidemia- continue statin  Hypertension- BP appropriate  continue valsartan -no ST-T wave changes nor Q-waves

## 2022-07-26 NOTE — PROGRESS NOTES
Assessment/Plan:    Diagnoses and all orders for this visit:    Myalgia    Personal history of breast cancer    Jaw pain              Patient Instructions   Myalgia- associated with pain and stiffness initiating in the right shoulder, migrate into involved bilateral shoulders and neck  It is associated with jaw pain  She denies any visual changes  She does report any myalgias any other locations  She also denies any fever, chills, night sweats or weight loss  Will evaluate for Endocrine, Infectious and rheumatological disorders with ESR, CRP, RF, WIN, TSH and Lyme serology  Will obtain CBC and CMP  If inflammatory markers are elevated, will evaluate for the differential diagnosis of temporal arteritis with temporal artery biopsy, and initiate high-dose steroids  Jaw pain- there is association with the above findings, however given her medical history significant for dyslipidemia, hypertension and history of breast cancer, will rule out coronary artery disease as the initiating factor for her jaw pain  Will obtain EKG in the office  Hyperlipidemia- continue statin  Hypertension- BP appropriate  continue valsartan -no ST-T wave changes nor Q-waves  Subjective:      Patient ID: Vishnu Mason is a 66 y o  female    Ms Phan Mater presents to clinic today for an acute visit  Patient states that she has been experiencing neck and bilateral shoulder stiffness and pain for a few weeks duration  She also reports that she has some jaw pain notice while eating, and reported swollen glands in her neck  She also reports some fatigue  She denies any specific signs for proximal muscle weakness such as combing her hair or standing from a seated position  She also denies any fever, chills, night sweats or weight loss            Current Outpatient Medications:     Calcium 500 MG tablet, Take by mouth, Disp: , Rfl:     Progesterone Micronized 10 % CREA, Place on the skin daily , Disp: , Rfl:     risedronate (ACTONEL) 150 MG tablet, Take 1 tablet (150 mg total) by mouth every 30 (thirty) days with water on empty stomach, nothing by mouth or lie down for next 30 minutes  , Disp: 3 tablet, Rfl: 3    rosuvastatin (CRESTOR) 10 MG tablet, TAKE 1 TABLET BY MOUTH EVERY DAY, Disp: 90 tablet, Rfl: 1    valsartan (DIOVAN) 80 mg tablet, TAKE 1 TABLET BY MOUTH EVERY DAY, Disp: 90 tablet, Rfl: 0    No results found for this or any previous visit (from the past 1008 hour(s))  The following portions of the patient's history were reviewed and updated as appropriate: allergies, current medications, past family history, past medical history, past social history, past surgical history and problem list      Review of Systems   Constitutional: Negative for chills and fever  HENT: Negative for ear pain and sore throat  Jaw pain  Eyes: Negative for pain and visual disturbance  Respiratory: Negative for cough and shortness of breath  Cardiovascular: Negative for chest pain and palpitations  Gastrointestinal: Negative for abdominal pain, constipation, diarrhea, nausea and vomiting  Genitourinary: Negative for dysuria and hematuria  Musculoskeletal: Positive for myalgias, neck pain and neck stiffness  Negative for arthralgias and back pain  Skin: Negative for color change and rash  Neurological: Positive for headaches  Negative for seizures, syncope and weakness  Psychiatric/Behavioral: Negative for agitation and behavioral problems  All other systems reviewed and are negative  Objective:      Vitals:    07/26/22 1059   BP: 132/72   Pulse: 82   Resp: 16   Temp: 99 3 °F (37 4 °C)   SpO2: 98%          Physical Exam  Vitals reviewed  Constitutional:       Appearance: Normal appearance  HENT:      Head: Normocephalic and atraumatic  Mouth/Throat:      Mouth: Mucous membranes are moist    Eyes:      General:         Right eye: No discharge  Left eye: No discharge        Conjunctiva/sclera: Conjunctivae normal    Neck:      Comments: There is some mild tenderness on palpation of cervical spine over C5-C6  There is no reduced range of motion of the neck  Cardiovascular:      Rate and Rhythm: Normal rate and regular rhythm  Heart sounds: S1 normal and S2 normal    Pulmonary:      Effort: Pulmonary effort is normal       Breath sounds: Normal breath sounds  Abdominal:      Palpations: Abdomen is soft  Musculoskeletal:         General: No swelling  Cervical back: Neck supple  Tenderness present  No rigidity  Right lower leg: No edema  Left lower leg: No edema  Lymphadenopathy:      Cervical: No cervical adenopathy  Skin:     General: Skin is warm and dry  Neurological:      General: No focal deficit present  Mental Status: She is alert     Psychiatric:         Mood and Affect: Mood normal          Behavior: Behavior normal

## 2022-07-27 ENCOUNTER — TELEPHONE (OUTPATIENT)
Dept: INTERNAL MEDICINE CLINIC | Facility: CLINIC | Age: 72
End: 2022-07-27

## 2022-07-27 LAB
ANA TITR SER IF: NEGATIVE {TITER}
B BURGDOR IGG+IGM SER-ACNC: >8 AI
RHEUMATOID FACT SER QL LA: NEGATIVE

## 2022-07-27 NOTE — TELEPHONE ENCOUNTER
Spoke to vincent she said she had to send a message to the dept to schedule and they would reach out to the patient with stat appt

## 2022-07-27 NOTE — TELEPHONE ENCOUNTER
----- Message from Vidhya Rayo MD sent at 7/27/2022  3:09 PM EDT -----  Regarding: stat u/s   I just ordered stat VAS temporal artery u/s    Please arrange asap

## 2022-07-28 ENCOUNTER — TELEPHONE (OUTPATIENT)
Dept: INTERNAL MEDICINE CLINIC | Facility: CLINIC | Age: 72
End: 2022-07-28

## 2022-07-28 ENCOUNTER — HOSPITAL ENCOUNTER (OUTPATIENT)
Dept: NON INVASIVE DIAGNOSTICS | Facility: HOSPITAL | Age: 72
Discharge: HOME | End: 2022-07-28
Payer: COMMERCIAL

## 2022-07-28 DIAGNOSIS — M31.6 TEMPORAL ARTERITIS (HCC): ICD-10-CM

## 2022-07-28 LAB
B BURGDOR IGG PATRN SER IB-IMP: POSITIVE
B BURGDOR IGM PATRN SER IB-IMP: NEGATIVE
B BURGDOR18KD IGG SER QL IB: PRESENT
B BURGDOR23KD IGG SER QL IB: ABNORMAL
B BURGDOR23KD IGM SER QL IB: ABNORMAL
B BURGDOR28KD IGG SER QL IB: ABNORMAL
B BURGDOR30KD IGG SER QL IB: ABNORMAL
B BURGDOR39KD IGG SER QL IB: PRESENT
B BURGDOR39KD IGM SER QL IB: ABNORMAL
B BURGDOR41KD IGG SER QL IB: PRESENT
B BURGDOR41KD IGM SER QL IB: ABNORMAL
B BURGDOR45KD IGG SER QL IB: PRESENT
B BURGDOR58KD IGG SER QL IB: PRESENT
B BURGDOR66KD IGG SER QL IB: PRESENT
B BURGDOR93KD IGG SER QL IB: PRESENT

## 2022-07-28 PROCEDURE — 93882 EXTRACRANIAL UNI/LTD STUDY: CPT

## 2022-07-28 PROCEDURE — 93882 EXTRACRANIAL UNI/LTD STUDY: CPT | Performed by: SURGERY

## 2022-07-28 NOTE — TELEPHONE ENCOUNTER
Monitor blood pressures  If anything, I would expected to go high on the prednisone  Do not take valsartan if systolic blood pressure less than 110  I am glad the muscle pains are better

## 2022-07-28 NOTE — TELEPHONE ENCOUNTER
LORENA:     Patient  want's Dr Guillaume Cm to know most of her stiffness has gone away, but now her blood pressure is low this morning  103/58

## 2022-07-29 ENCOUNTER — TELEPHONE (OUTPATIENT)
Dept: INTERNAL MEDICINE CLINIC | Facility: CLINIC | Age: 72
End: 2022-07-29

## 2022-07-29 NOTE — TELEPHONE ENCOUNTER
Ultrasound results reviewed with patient by phone  Myalgias have improved by 90% within 24 hours of starting prednisone  No gastrointestinal side effects thus far  Patient notes blood pressures are low and she was advised to discontinue valsartan and monitor blood pressures  Case was discussed with General surgery as well as Rheumatology regarding strong clinical suspicion for temporal arteritis based upon symptomatology and also supported by prompt response to prednisone  We discussed possibility of pursuing temporal artery biopsy  I explained to the patient that my clinical suspicion is strong enough that I would be inclined to continue prednisone even if temporal artery biopsy was not conclusive for temporal arteritis  Based upon this, patient and I felt comfortable moving forward with treatment for temporal arteritis without pursuing biopsy

## 2022-08-05 ENCOUNTER — OFFICE VISIT (OUTPATIENT)
Dept: INTERNAL MEDICINE CLINIC | Facility: CLINIC | Age: 72
End: 2022-08-05
Payer: COMMERCIAL

## 2022-08-05 ENCOUNTER — TELEPHONE (OUTPATIENT)
Dept: INTERNAL MEDICINE CLINIC | Facility: CLINIC | Age: 72
End: 2022-08-05

## 2022-08-05 ENCOUNTER — APPOINTMENT (OUTPATIENT)
Dept: LAB | Facility: CLINIC | Age: 72
End: 2022-08-05
Payer: COMMERCIAL

## 2022-08-05 VITALS
OXYGEN SATURATION: 97 % | SYSTOLIC BLOOD PRESSURE: 128 MMHG | RESPIRATION RATE: 16 BRPM | BODY MASS INDEX: 22.65 KG/M2 | WEIGHT: 123.1 LBS | DIASTOLIC BLOOD PRESSURE: 74 MMHG | TEMPERATURE: 98.3 F | HEART RATE: 77 BPM | HEIGHT: 62 IN

## 2022-08-05 DIAGNOSIS — M79.10 MYALGIA: ICD-10-CM

## 2022-08-05 DIAGNOSIS — I10 PRIMARY HYPERTENSION: ICD-10-CM

## 2022-08-05 DIAGNOSIS — Z79.52 LONG TERM SYSTEMIC STEROID USER: ICD-10-CM

## 2022-08-05 DIAGNOSIS — R29.898 WEAKNESS OF BOTH LOWER EXTREMITIES: ICD-10-CM

## 2022-08-05 DIAGNOSIS — M31.6 TEMPORAL ARTERITIS (HCC): Primary | ICD-10-CM

## 2022-08-05 DIAGNOSIS — M31.6 TEMPORAL ARTERITIS (HCC): ICD-10-CM

## 2022-08-05 LAB
ALBUMIN SERPL BCP-MCNC: 3.6 G/DL (ref 3.5–5)
ALP SERPL-CCNC: 51 U/L (ref 46–116)
ALT SERPL W P-5'-P-CCNC: 25 U/L (ref 12–78)
ANION GAP SERPL CALCULATED.3IONS-SCNC: 4 MMOL/L (ref 4–13)
AST SERPL W P-5'-P-CCNC: 19 U/L (ref 5–45)
BASOPHILS # BLD AUTO: 0.02 THOUSANDS/ΜL (ref 0–0.1)
BASOPHILS NFR BLD AUTO: 0 % (ref 0–1)
BILIRUB SERPL-MCNC: 0.44 MG/DL (ref 0.2–1)
BUN SERPL-MCNC: 14 MG/DL (ref 5–25)
CALCIUM SERPL-MCNC: 9.7 MG/DL (ref 8.3–10.1)
CHLORIDE SERPL-SCNC: 103 MMOL/L (ref 96–108)
CK SERPL-CCNC: 42 U/L (ref 26–192)
CO2 SERPL-SCNC: 29 MMOL/L (ref 21–32)
CREAT SERPL-MCNC: 0.9 MG/DL (ref 0.6–1.3)
CRP SERPL QL: <3 MG/L
EOSINOPHIL # BLD AUTO: 0 THOUSAND/ΜL (ref 0–0.61)
EOSINOPHIL NFR BLD AUTO: 0 % (ref 0–6)
ERYTHROCYTE [DISTWIDTH] IN BLOOD BY AUTOMATED COUNT: 14 % (ref 11.6–15.1)
ERYTHROCYTE [SEDIMENTATION RATE] IN BLOOD: 34 MM/HOUR (ref 0–29)
GFR SERPL CREATININE-BSD FRML MDRD: 64 ML/MIN/1.73SQ M
GLUCOSE P FAST SERPL-MCNC: 162 MG/DL (ref 65–99)
HCT VFR BLD AUTO: 44.5 % (ref 34.8–46.1)
HGB BLD-MCNC: 14.3 G/DL (ref 11.5–15.4)
IMM GRANULOCYTES # BLD AUTO: 0.07 THOUSAND/UL (ref 0–0.2)
IMM GRANULOCYTES NFR BLD AUTO: 1 % (ref 0–2)
LYMPHOCYTES # BLD AUTO: 1.2 THOUSANDS/ΜL (ref 0.6–4.47)
LYMPHOCYTES NFR BLD AUTO: 9 % (ref 14–44)
MCH RBC QN AUTO: 29.5 PG (ref 26.8–34.3)
MCHC RBC AUTO-ENTMCNC: 32.1 G/DL (ref 31.4–37.4)
MCV RBC AUTO: 92 FL (ref 82–98)
MONOCYTES # BLD AUTO: 0.09 THOUSAND/ΜL (ref 0.17–1.22)
MONOCYTES NFR BLD AUTO: 1 % (ref 4–12)
NEUTROPHILS # BLD AUTO: 12.81 THOUSANDS/ΜL (ref 1.85–7.62)
NEUTS SEG NFR BLD AUTO: 89 % (ref 43–75)
NRBC BLD AUTO-RTO: 0 /100 WBCS
PLATELET # BLD AUTO: 363 THOUSANDS/UL (ref 149–390)
PMV BLD AUTO: 10 FL (ref 8.9–12.7)
POTASSIUM SERPL-SCNC: 4 MMOL/L (ref 3.5–5.3)
PROT SERPL-MCNC: 7.6 G/DL (ref 6.4–8.4)
RBC # BLD AUTO: 4.85 MILLION/UL (ref 3.81–5.12)
SODIUM SERPL-SCNC: 136 MMOL/L (ref 135–147)
WBC # BLD AUTO: 14.19 THOUSAND/UL (ref 4.31–10.16)

## 2022-08-05 PROCEDURE — 86664 EPSTEIN-BARR NUCLEAR ANTIGEN: CPT

## 2022-08-05 PROCEDURE — 86665 EPSTEIN-BARR CAPSID VCA: CPT

## 2022-08-05 PROCEDURE — 3078F DIAST BP <80 MM HG: CPT | Performed by: INTERNAL MEDICINE

## 2022-08-05 PROCEDURE — 80053 COMPREHEN METABOLIC PANEL: CPT

## 2022-08-05 PROCEDURE — 36415 COLL VENOUS BLD VENIPUNCTURE: CPT

## 2022-08-05 PROCEDURE — 82550 ASSAY OF CK (CPK): CPT

## 2022-08-05 PROCEDURE — 85652 RBC SED RATE AUTOMATED: CPT

## 2022-08-05 PROCEDURE — 99214 OFFICE O/P EST MOD 30 MIN: CPT | Performed by: INTERNAL MEDICINE

## 2022-08-05 PROCEDURE — 1160F RVW MEDS BY RX/DR IN RCRD: CPT | Performed by: INTERNAL MEDICINE

## 2022-08-05 PROCEDURE — 86140 C-REACTIVE PROTEIN: CPT

## 2022-08-05 PROCEDURE — 85025 COMPLETE CBC W/AUTO DIFF WBC: CPT

## 2022-08-05 PROCEDURE — 3074F SYST BP LT 130 MM HG: CPT | Performed by: INTERNAL MEDICINE

## 2022-08-05 PROCEDURE — 86663 EPSTEIN-BARR ANTIBODY: CPT

## 2022-08-05 NOTE — PATIENT INSTRUCTIONS
Check labs now, will follow accordingly    Temporal arteritis-continue prednisone at 60 mg, if sedimentation rate has normalized would consider dropping to 10 and then rechecking sedimentation rate in another 2 weeks  Abnormal sensation in legs-check labs, monitor for proximal muscle weakness as discussed    Hypertension-stay off of valsartan unless blood pressures are consistently greater than 140/90    Monitor your blood pressure fairly closely at least 5 days per week

## 2022-08-05 NOTE — PROGRESS NOTES
Assessment/Plan:    Diagnoses and all orders for this visit:    Temporal arteritis ([facility] [address] )  -     CK; Future  -     C-reactive protein; Future  -     Sedimentation rate, automated; Future  -     CBC and differential; Future  -     Comprehensive metabolic panel; Future    Primary hypertension  -     CK; Future  -     C-reactive protein; Future  -     Sedimentation rate, automated; Future  -     CBC and differential; Future  -     Comprehensive metabolic panel; Future    Weakness of both lower extremities  -     CK; Future  -     C-reactive protein; Future  -     Sedimentation rate, automated; Future  -     CBC and differential; Future  -     Comprehensive metabolic panel; Future    Long term systemic steroid user  -     CK; Future  -     C-reactive protein; Future  -     Sedimentation rate, automated; Future  -     CBC and differential; Future  -     Comprehensive metabolic panel; Future            Patient Instructions   Check labs now, will follow accordingly    Temporal arteritis-continue prednisone at 60 mg, if sedimentation rate has normalized would consider dropping to 10 and then rechecking sedimentation rate in another 2 weeks  Abnormal sensation in legs-check labs, monitor for proximal muscle weakness as discussed    Hypertension-stay off of valsartan unless blood pressures are consistently greater than 140/90  Monitor your blood pressure fairly closely at least 5 days per week        Subjective:      Patient ID: Vishnu Mason is a 66 y o  female    Follow-up temporal arteritis    Patient started on prednisone 60 mg daily for presumed temporal arteritis on July 26th  Her presenting symptoms including myalgias across the chest shoulders and neck as well as pain in the left temporal and postauricular area resolved within 48 hours of starting the prednisone and she noticed rapid improvement within the 1st day  Since that time she has noticed that her blood pressure went low with systolics as low as 97   She was advised to stop valsartan  Blood pressures came up to the 1 teens and she went back on valsartan but it dropped again so she has been off the valsartan for the last 2 days  She has also noted a rubbery sensation in her legs where they just do not feel right  Her strength is good, she has no problem climbing stairs or getting out of a chair but her endurant is much less than prior  Previously she had been able to walk 2 miles with no difficulty and now she tires after about half a mi  She also notes an odd sensation in her head like she is about to get a head cold that never comes  She has had some discomfort in the left hip which was replaced a little over a year ago and also has had some swelling around the ankle on the left side  She has not had any significant stomach upset and her appetite has been stable, not increased  Sleep duration is about the same but she does not feel that she gets as deep of asleep while on the prednisone  She was seen by her ophthalmologist who noted no abnormalities and no evidence of arteritis  She does note that her area of focus has changed and she needs to reposition her head for reading  Current Outpatient Medications:     Calcium 500 MG tablet, Take by mouth, Disp: , Rfl:     predniSONE 20 mg tablet, Take 3 tablets (60 mg total) by mouth daily, Disp: 90 tablet, Rfl: 3    Progesterone Micronized 10 % CREA, Place on the skin daily , Disp: , Rfl:     risedronate (ACTONEL) 150 MG tablet, Take 1 tablet (150 mg total) by mouth every 30 (thirty) days with water on empty stomach, nothing by mouth or lie down for next 30 minutes  , Disp: 3 tablet, Rfl: 3    rosuvastatin (CRESTOR) 10 MG tablet, TAKE 1 TABLET BY MOUTH EVERY DAY, Disp: 90 tablet, Rfl: 1    valsartan (DIOVAN) 80 mg tablet, TAKE 1 TABLET BY MOUTH EVERY DAY (Patient not taking: Reported on 8/5/2022), Disp: 90 tablet, Rfl: 0    Recent Results (from the past 1008 hour(s))   Sedimentation rate, automated    Collection Time: 07/26/22 12:17 PM   Result Value Ref Range    Sed Rate 61 (H) 0 - 29 mm/hour   CK (with reflex to MB)    Collection Time: 07/26/22 12:17 PM   Result Value Ref Range    Total CK 84 26 - 192 U/L   TSH, 3rd generation with Free T4 reflex    Collection Time: 07/26/22 12:17 PM   Result Value Ref Range    TSH 3RD GENERATON 1 880 0 450 - 4 500 uIU/mL   RF Screen w/ Reflex to Titer    Collection Time: 07/26/22 12:17 PM   Result Value Ref Range    Rheumatoid Factor Negative Negative   Antinuclear Antibodies (WIN), IFA    Collection Time: 07/26/22 12:17 PM   Result Value Ref Range    Antinuclear Antibodies, IFA Negative    C-reactive protein    Collection Time: 07/26/22 12:17 PM   Result Value Ref Range    CRP 10 1 (H) <3 0 mg/L   CBC and differential    Collection Time: 07/26/22 12:17 PM   Result Value Ref Range    WBC 7 45 4 31 - 10 16 Thousand/uL    RBC 4 66 3 81 - 5 12 Million/uL    Hemoglobin 13 5 11 5 - 15 4 g/dL    Hematocrit 43 0 34 8 - 46 1 %    MCV 92 82 - 98 fL    MCH 29 0 26 8 - 34 3 pg    MCHC 31 4 31 4 - 37 4 g/dL    RDW 13 2 11 6 - 15 1 %    MPV 10 6 8 9 - 12 7 fL    Platelets 720 808 - 234 Thousands/uL    nRBC 0 /100 WBCs    Neutrophils Relative 72 43 - 75 %    Immat GRANS % 0 0 - 2 %    Lymphocytes Relative 20 14 - 44 %    Monocytes Relative 6 4 - 12 %    Eosinophils Relative 2 0 - 6 %    Basophils Relative 0 0 - 1 %    Neutrophils Absolute 5 29 1 85 - 7 62 Thousands/µL    Immature Grans Absolute 0 02 0 00 - 0 20 Thousand/uL    Lymphocytes Absolute 1 51 0 60 - 4 47 Thousands/µL    Monocytes Absolute 0 48 0 17 - 1 22 Thousand/µL    Eosinophils Absolute 0 13 0 00 - 0 61 Thousand/µL    Basophils Absolute 0 02 0 00 - 0 10 Thousands/µL   Comprehensive metabolic panel    Collection Time: 07/26/22 12:17 PM   Result Value Ref Range    Sodium 137 135 - 147 mmol/L    Potassium 4 3 3 5 - 5 3 mmol/L    Chloride 105 96 - 108 mmol/L    CO2 29 21 - 32 mmol/L    ANION GAP 3 (L) 4 - 13 mmol/L BUN 10 5 - 25 mg/dL    Creatinine 0 68 0 60 - 1 30 mg/dL    Glucose, Fasting 92 65 - 99 mg/dL    Calcium 9 3 8 3 - 10 1 mg/dL    AST 27 5 - 45 U/L    ALT 21 12 - 78 U/L    Alkaline Phosphatase 57 46 - 116 U/L    Total Protein 7 9 6 4 - 8 4 g/dL    Albumin 3 8 3 5 - 5 0 g/dL    Total Bilirubin 0 34 0 20 - 1 00 mg/dL    eGFR 87 ml/min/1 73sq m   Lyme Antibody Profile with reflex to WB    Collection Time: 07/26/22 12:17 PM   Result Value Ref Range    Lyme Total Antibodies >8 0 (H) 0 2 - 8 0 AI   Lyme Western Blot, Serum    Collection Time: 07/26/22 12:17 PM   Result Value Ref Range    Lyme 18 kD IgG Present (A)     Lyme 23 kD IgG Absent     Lyme 28 kD IgG Absent     Lyme 30 kD IgG Absent     Lyme 39 kD IgG Present (A)     Lyme 41 kD IgG Present (A)     Lyme 45 kD IgG Present (A)     Lyme 58 kD IgG Present (A)     Lyme 66 kD IgG Present (A)     Lyme 93 kD IgG Present (A)     Lyme 23 kD IgM Absent     Lyme 39 kD IgM Absent     Lyme 41 kD IgM Absent     Lyme IgG WB Interp  Positive (A)     Lyme IgM WB Interp  Negative        The following portions of the patient's history were reviewed and updated as appropriate: allergies, current medications, past family history, past medical history, past social history, past surgical history and problem list      Review of Systems   Constitutional: Positive for fatigue  Negative for appetite change, chills, diaphoresis, fever and unexpected weight change  HENT: Negative for congestion, hearing loss and rhinorrhea  Eyes: Positive for visual disturbance  Respiratory: Negative for cough, chest tightness, shortness of breath and wheezing  Cardiovascular: Negative for chest pain, palpitations and leg swelling  Gastrointestinal: Negative for abdominal pain and blood in stool  Endocrine: Negative for cold intolerance, heat intolerance, polydipsia and polyuria  Genitourinary: Negative for difficulty urinating, dysuria, frequency and urgency     Musculoskeletal: Positive for gait problem  Negative for arthralgias and myalgias  Skin: Negative for rash  Neurological: Negative for dizziness, weakness, light-headedness and headaches  Hematological: Does not bruise/bleed easily  Psychiatric/Behavioral: Negative for dysphoric mood and sleep disturbance  Objective:      Vitals:    08/05/22 1346   BP: 128/74   Pulse: 77   Resp: 16   Temp: 98 3 °F (36 8 °C)   SpO2: 97%          Physical Exam  Constitutional:       Appearance: She is well-developed  HENT:      Head: Normocephalic and atraumatic  Nose: Nose normal    Eyes:      General: No scleral icterus  Conjunctiva/sclera: Conjunctivae normal       Pupils: Pupils are equal, round, and reactive to light  Neck:      Thyroid: No thyromegaly  Vascular: No JVD  Trachea: No tracheal deviation  Cardiovascular:      Rate and Rhythm: Normal rate and regular rhythm  Heart sounds: No murmur heard  No friction rub  No gallop  Pulmonary:      Effort: Pulmonary effort is normal  No respiratory distress  Breath sounds: Normal breath sounds  No wheezing or rales  Abdominal:      General: Bowel sounds are normal  There is no distension  Palpations: Abdomen is soft  There is no mass  Tenderness: There is no abdominal tenderness  There is no guarding or rebound  Musculoskeletal:         General: No tenderness  Cervical back: Normal range of motion and neck supple  Comments: Able to stand on heels and tiptoes, able to step up 11 in exam table step independently with each leg  Patellar reflexes intact and symmetrical   Lymphadenopathy:      Cervical: No cervical adenopathy  Skin:     General: Skin is warm and dry  Findings: No erythema or rash  Neurological:      Mental Status: She is alert and oriented to person, place, and time  Cranial Nerves: No cranial nerve deficit  Psychiatric:         Behavior: Behavior normal          Thought Content:  Thought content normal  Judgment: Judgment normal

## 2022-08-05 NOTE — TELEPHONE ENCOUNTER
----- Message from Keila Rodriguez MD sent at 8/5/2022 12:34 PM EDT -----  Regarding: RE: updating symptoms  Can she come in this afternoon? ASAP    ----- Message -----  From: [place]  Sent: 8/5/2022  11:31 AM EDT  To: Keila Rodriguez MD  Subject: FW: updating symptoms                              ----- Message -----  From: Jose Cobian  Sent: 8/5/2022  11:23 AM EDT  To: [facility]  Subject: updating symptoms                                After 10 days on Prednisone, I am experiencing constant rubbery legs and head congestion  My blood pressure readings drop if I take Valsartan so I plan to stay off of that a bit longer  My eye examination did not find any signs of damage   Is this my new normal?

## 2022-08-06 DIAGNOSIS — M31.6 TEMPORAL ARTERITIS (HCC): Primary | ICD-10-CM

## 2022-08-06 LAB
EBV NA IGG SER IA-ACNC: 216 U/ML (ref 0–17.9)
EBV VCA IGG SER IA-ACNC: 251 U/ML (ref 0–17.9)
EBV VCA IGM SER IA-ACNC: 62.1 U/ML (ref 0–35.9)
INTERPRETATION: ABNORMAL

## 2022-08-19 ENCOUNTER — APPOINTMENT (OUTPATIENT)
Dept: LAB | Facility: CLINIC | Age: 72
End: 2022-08-19
Payer: COMMERCIAL

## 2022-08-19 LAB — ERYTHROCYTE [SEDIMENTATION RATE] IN BLOOD: 23 MM/HOUR (ref 0–29)

## 2022-08-19 PROCEDURE — 36415 COLL VENOUS BLD VENIPUNCTURE: CPT | Performed by: INTERNAL MEDICINE

## 2022-08-19 PROCEDURE — 85652 RBC SED RATE AUTOMATED: CPT | Performed by: INTERNAL MEDICINE

## 2022-09-02 ENCOUNTER — APPOINTMENT (OUTPATIENT)
Dept: LAB | Facility: CLINIC | Age: 72
End: 2022-09-02
Payer: COMMERCIAL

## 2022-09-06 DIAGNOSIS — Z96.642 HISTORY OF TOTAL LEFT HIP REPLACEMENT: ICD-10-CM

## 2022-09-06 DIAGNOSIS — M25.552 PAIN IN LEFT HIP: Primary | ICD-10-CM

## 2022-09-11 DIAGNOSIS — E78.2 MIXED HYPERLIPIDEMIA: ICD-10-CM

## 2022-09-11 RX ORDER — ROSUVASTATIN CALCIUM 10 MG/1
TABLET, COATED ORAL
Qty: 90 TABLET | Refills: 1 | Status: SHIPPED | OUTPATIENT
Start: 2022-09-11

## 2022-09-12 ENCOUNTER — APPOINTMENT (OUTPATIENT)
Dept: RADIOLOGY | Facility: CLINIC | Age: 72
End: 2022-09-12
Payer: COMMERCIAL

## 2022-09-12 DIAGNOSIS — Z96.642 HISTORY OF TOTAL LEFT HIP REPLACEMENT: ICD-10-CM

## 2022-09-12 DIAGNOSIS — M25.552 PAIN IN LEFT HIP: ICD-10-CM

## 2022-09-12 PROCEDURE — 73502 X-RAY EXAM HIP UNI 2-3 VIEWS: CPT

## 2022-09-15 ENCOUNTER — APPOINTMENT (OUTPATIENT)
Dept: LAB | Facility: CLINIC | Age: 72
End: 2022-09-15
Payer: COMMERCIAL

## 2022-09-16 DIAGNOSIS — M31.6 TEMPORAL ARTERITIS (HCC): Primary | ICD-10-CM

## 2022-09-16 RX ORDER — PREDNISONE 5 MG/1
TABLET ORAL
Qty: 100 TABLET | Refills: 3 | Status: SHIPPED | OUTPATIENT
Start: 2022-09-16 | End: 2022-10-19

## 2022-09-25 DIAGNOSIS — I10 ESSENTIAL HYPERTENSION: ICD-10-CM

## 2022-09-25 RX ORDER — VALSARTAN 80 MG/1
TABLET ORAL
Qty: 90 TABLET | Refills: 0 | Status: SHIPPED | OUTPATIENT
Start: 2022-09-25 | End: 2022-10-06 | Stop reason: ALTCHOICE

## 2022-09-27 ENCOUNTER — APPOINTMENT (OUTPATIENT)
Dept: LAB | Facility: CLINIC | Age: 72
End: 2022-09-27
Payer: COMMERCIAL

## 2022-09-27 DIAGNOSIS — R73.01 IMPAIRED FASTING GLUCOSE: ICD-10-CM

## 2022-09-27 DIAGNOSIS — E78.2 MIXED HYPERLIPIDEMIA: ICD-10-CM

## 2022-09-27 LAB
ALBUMIN SERPL BCP-MCNC: 3.1 G/DL (ref 3.5–5)
ALP SERPL-CCNC: 45 U/L (ref 46–116)
ALT SERPL W P-5'-P-CCNC: 22 U/L (ref 12–78)
ANION GAP SERPL CALCULATED.3IONS-SCNC: 5 MMOL/L (ref 4–13)
AST SERPL W P-5'-P-CCNC: 20 U/L (ref 5–45)
BASOPHILS # BLD AUTO: 0.03 THOUSANDS/ΜL (ref 0–0.1)
BASOPHILS NFR BLD AUTO: 0 % (ref 0–1)
BILIRUB SERPL-MCNC: 0.46 MG/DL (ref 0.2–1)
BUN SERPL-MCNC: 11 MG/DL (ref 5–25)
CALCIUM ALBUM COR SERPL-MCNC: 10.1 MG/DL (ref 8.3–10.1)
CALCIUM SERPL-MCNC: 9.4 MG/DL (ref 8.3–10.1)
CHLORIDE SERPL-SCNC: 105 MMOL/L (ref 96–108)
CHOLEST SERPL-MCNC: 225 MG/DL
CO2 SERPL-SCNC: 31 MMOL/L (ref 21–32)
CREAT SERPL-MCNC: 0.9 MG/DL (ref 0.6–1.3)
EOSINOPHIL # BLD AUTO: 0.05 THOUSAND/ΜL (ref 0–0.61)
EOSINOPHIL NFR BLD AUTO: 1 % (ref 0–6)
ERYTHROCYTE [DISTWIDTH] IN BLOOD BY AUTOMATED COUNT: 15.5 % (ref 11.6–15.1)
EST. AVERAGE GLUCOSE BLD GHB EST-MCNC: 120 MG/DL
GFR SERPL CREATININE-BSD FRML MDRD: 63 ML/MIN/1.73SQ M
GLUCOSE P FAST SERPL-MCNC: 77 MG/DL (ref 65–99)
HBA1C MFR BLD: 5.8 %
HCT VFR BLD AUTO: 45.4 % (ref 34.8–46.1)
HDLC SERPL-MCNC: 84 MG/DL
HGB BLD-MCNC: 14.3 G/DL (ref 11.5–15.4)
IMM GRANULOCYTES # BLD AUTO: 0.07 THOUSAND/UL (ref 0–0.2)
IMM GRANULOCYTES NFR BLD AUTO: 1 % (ref 0–2)
LDLC SERPL CALC-MCNC: 126 MG/DL (ref 0–100)
LYMPHOCYTES # BLD AUTO: 2.73 THOUSANDS/ΜL (ref 0.6–4.47)
LYMPHOCYTES NFR BLD AUTO: 25 % (ref 14–44)
MCH RBC QN AUTO: 29.6 PG (ref 26.8–34.3)
MCHC RBC AUTO-ENTMCNC: 31.5 G/DL (ref 31.4–37.4)
MCV RBC AUTO: 94 FL (ref 82–98)
MONOCYTES # BLD AUTO: 0.65 THOUSAND/ΜL (ref 0.17–1.22)
MONOCYTES NFR BLD AUTO: 6 % (ref 4–12)
NEUTROPHILS # BLD AUTO: 7.52 THOUSANDS/ΜL (ref 1.85–7.62)
NEUTS SEG NFR BLD AUTO: 67 % (ref 43–75)
NONHDLC SERPL-MCNC: 141 MG/DL
NRBC BLD AUTO-RTO: 0 /100 WBCS
PLATELET # BLD AUTO: 287 THOUSANDS/UL (ref 149–390)
PMV BLD AUTO: 10.4 FL (ref 8.9–12.7)
POTASSIUM SERPL-SCNC: 3.9 MMOL/L (ref 3.5–5.3)
PROT SERPL-MCNC: 7.2 G/DL (ref 6.4–8.4)
RBC # BLD AUTO: 4.83 MILLION/UL (ref 3.81–5.12)
SODIUM SERPL-SCNC: 141 MMOL/L (ref 135–147)
TRIGL SERPL-MCNC: 73 MG/DL
TSH SERPL DL<=0.05 MIU/L-ACNC: 2.85 UIU/ML (ref 0.45–4.5)
WBC # BLD AUTO: 11.05 THOUSAND/UL (ref 4.31–10.16)

## 2022-09-27 PROCEDURE — 85025 COMPLETE CBC W/AUTO DIFF WBC: CPT

## 2022-09-27 PROCEDURE — 84443 ASSAY THYROID STIM HORMONE: CPT

## 2022-09-27 PROCEDURE — 36415 COLL VENOUS BLD VENIPUNCTURE: CPT

## 2022-09-27 PROCEDURE — 83036 HEMOGLOBIN GLYCOSYLATED A1C: CPT

## 2022-09-27 PROCEDURE — 80061 LIPID PANEL: CPT

## 2022-09-27 PROCEDURE — 80053 COMPREHEN METABOLIC PANEL: CPT

## 2022-10-04 ENCOUNTER — RA CDI HCC (OUTPATIENT)
Dept: OTHER | Facility: HOSPITAL | Age: 72
End: 2022-10-04

## 2022-10-04 NOTE — PROGRESS NOTES
[facility]  coding opportunities       Chart reviewed, no opportunity found:   [address]        Patients Insurance     Medicare Insurance: [employer]

## 2022-10-06 ENCOUNTER — OFFICE VISIT (OUTPATIENT)
Dept: INTERNAL MEDICINE CLINIC | Facility: CLINIC | Age: 72
End: 2022-10-06
Payer: COMMERCIAL

## 2022-10-06 DIAGNOSIS — I10 PRIMARY HYPERTENSION: ICD-10-CM

## 2022-10-06 DIAGNOSIS — I10 ESSENTIAL HYPERTENSION: ICD-10-CM

## 2022-10-06 DIAGNOSIS — R60.0 EDEMA OF LEFT LOWER EXTREMITY: ICD-10-CM

## 2022-10-06 DIAGNOSIS — M31.6 TEMPORAL ARTERITIS (HCC): ICD-10-CM

## 2022-10-06 DIAGNOSIS — R73.03 PREDIABETES: Primary | ICD-10-CM

## 2022-10-06 DIAGNOSIS — Z96.642 STATUS POST TOTAL HIP REPLACEMENT, LEFT: ICD-10-CM

## 2022-10-06 DIAGNOSIS — Z79.52 LONG TERM SYSTEMIC STEROID USER: ICD-10-CM

## 2022-10-06 DIAGNOSIS — M16.12 ARTHRITIS OF LEFT HIP: ICD-10-CM

## 2022-10-06 DIAGNOSIS — M79.10 MYALGIA: ICD-10-CM

## 2022-10-06 DIAGNOSIS — E78.2 MIXED HYPERLIPIDEMIA: ICD-10-CM

## 2022-10-06 DIAGNOSIS — R73.01 IMPAIRED FASTING GLUCOSE: ICD-10-CM

## 2022-10-06 DIAGNOSIS — I34.0 NONRHEUMATIC MITRAL VALVE REGURGITATION: ICD-10-CM

## 2022-10-06 PROCEDURE — 99214 OFFICE O/P EST MOD 30 MIN: CPT | Performed by: INTERNAL MEDICINE

## 2022-10-06 NOTE — PATIENT INSTRUCTIONS
Lab data reviewed in detail and compared to prior    Temporal arteritis stable with prednisone taper, recheck sed rate in 2 weeks    Osteoporosis-continue on Actonel     Hypertension stable off of medication    Hyperlipidemia at goal on rosuvastatin     Postprandial weakness coupled with prediabetes and prolonged steroid use-check blood sugars when symptomatic and report any significant findings of blood sugars below 60 or greater than 200  Murmur-echo from March 2021 reviewed consistent with aortic sclerosis without stenosis but mild mitral regurgitation  Will repeat echo prior to follow-up in March to assure stability  Patient was advised to take ring taken off as her swelling of her hand has been worsening  We discussed vaccination in setting of steroid use    She is not likely to get complete response but based upon upcoming travel plans I have recommended flu shot and COVID booster at least 1 week prior to travel

## 2022-10-06 NOTE — PROGRESS NOTES
Assessment/Plan:    Diagnoses and all orders for this visit:    Prediabetes  -     Glucometer  -     Glucometer test strips  -     Lancets  -     CBC and differential; Future  -     Comprehensive metabolic panel; Future  -     HEMOGLOBIN A1C W/ EAG ESTIMATION; Future  -     Lipid panel; Future    Essential hypertension  -     CBC and differential; Future  -     Comprehensive metabolic panel; Future  -     HEMOGLOBIN A1C W/ EAG ESTIMATION; Future  -     Lipid panel; Future    Mixed hyperlipidemia  -     CBC and differential; Future  -     Comprehensive metabolic panel; Future  -     HEMOGLOBIN A1C W/ EAG ESTIMATION; Future  -     Lipid panel; Future    Long term systemic steroid user  -     CBC and differential; Future  -     Comprehensive metabolic panel; Future  -     HEMOGLOBIN A1C W/ EAG ESTIMATION; Future  -     Lipid panel; Future    Temporal arteritis (HCC)    Edema of left lower extremity    Impaired fasting glucose    Primary hypertension    Arthritis of left hip    Myalgia    Status post total hip replacement, left    Nonrheumatic mitral valve regurgitation  -     Echo complete w/ contrast if indicated; Future            Patient Instructions   Lab data reviewed in detail and compared to prior    Temporal arteritis stable with prednisone taper, recheck sed rate in 2 weeks    Osteoporosis-continue on Actonel     Hypertension stable off of medication    Hyperlipidemia at goal on rosuvastatin     Postprandial weakness coupled with prediabetes and prolonged steroid use-check blood sugars when symptomatic and report any significant findings of blood sugars below 60 or greater than 200  Patient was advised to take ring taken off as her swelling of her hand has been worsening  We discussed vaccination in setting of steroid use    She is not likely to get complete response but based upon upcoming travel plans I have recommended flu shot and COVID booster at least 1 week prior to travel      Subjective: Patient ID: Leatha Mcgovern is a 67 y o  female    F/u mmp and review labs   Feeling generally well, but multiple concerns  Temporal arteritis-she has weaned down to 30 milligrams of prednisone daily since last week  She has had no pain since starting the prednisone  She is upset with moon face ease and continues to have a few rubbery feeling in her legs that is worse after she gets up especially after she gets up from a meal   She questions whether it is related to blood sugar fluctuations  Osteoporosis-tolerating risedronate monthly   She notes left hip pain and stiffness that she attributes to cold  She had 2 severe episodes, 1 after sitting on bleachers and 40 degree weather and the other time walking outside slowly in sub freezing weather  She brought this up to her orthopedic surgeon who was not concerned  HTN-she has been off valsartan since starting prednisone, home BP's stable in the 120s over 70s  HPL-tolerating crestor   Walking tiw  She has questions about vaccinations  She will be going on a vacation in December, [address] to [place]  Current Outpatient Medications:     Calcium 500 MG tablet, Take by mouth, Disp: , Rfl:     predniSONE 5 mg tablet, 35 mg daily, taper as directed, Disp: 100 tablet, Rfl: 3    Progesterone Micronized 10 % CREA, Place on the skin daily , Disp: , Rfl:     risedronate (ACTONEL) 150 MG tablet, Take 1 tablet (150 mg total) by mouth every 30 (thirty) days with water on empty stomach, nothing by mouth or lie down for next 30 minutes  , Disp: 3 tablet, Rfl: 3    rosuvastatin (CRESTOR) 10 MG tablet, TAKE 1 TABLET BY MOUTH EVERY DAY, Disp: 90 tablet, Rfl: 1    Recent Results (from the past 1008 hour(s))   Sedimentation rate, automated    Collection Time: 09/02/22 11:37 AM   Result Value Ref Range    Sed Rate 37 (H) 0 - 29 mm/hour   Sedimentation rate, automated    Collection Time: 09/15/22  2:13 PM   Result Value Ref Range    Sed Rate 35 (H) 0 - 29 mm/hour Sedimentation rate, automated    Collection Time: 09/27/22  7:48 AM   Result Value Ref Range    Sed Rate 38 (H) 0 - 29 mm/hour   CBC and differential    Collection Time: 09/27/22  7:48 AM   Result Value Ref Range    WBC 11 05 (H) 4 31 - 10 16 Thousand/uL    RBC 4 83 3 81 - 5 12 Million/uL    Hemoglobin 14 3 11 5 - 15 4 g/dL    Hematocrit 45 4 34 8 - 46 1 %    MCV 94 82 - 98 fL    MCH 29 6 26 8 - 34 3 pg    MCHC 31 5 31 4 - 37 4 g/dL    RDW 15 5 (H) 11 6 - 15 1 %    MPV 10 4 8 9 - 12 7 fL    Platelets 221 817 - 092 Thousands/uL    nRBC 0 /100 WBCs    Neutrophils Relative 67 43 - 75 %    Immat GRANS % 1 0 - 2 %    Lymphocytes Relative 25 14 - 44 %    Monocytes Relative 6 4 - 12 %    Eosinophils Relative 1 0 - 6 %    Basophils Relative 0 0 - 1 %    Neutrophils Absolute 7 52 1 85 - 7 62 Thousands/µL    Immature Grans Absolute 0 07 0 00 - 0 20 Thousand/uL    Lymphocytes Absolute 2 73 0 60 - 4 47 Thousands/µL    Monocytes Absolute 0 65 0 17 - 1 22 Thousand/µL    Eosinophils Absolute 0 05 0 00 - 0 61 Thousand/µL    Basophils Absolute 0 03 0 00 - 0 10 Thousands/µL   Comprehensive metabolic panel    Collection Time: 09/27/22  7:48 AM   Result Value Ref Range    Sodium 141 135 - 147 mmol/L    Potassium 3 9 3 5 - 5 3 mmol/L    Chloride 105 96 - 108 mmol/L    CO2 31 21 - 32 mmol/L    ANION GAP 5 4 - 13 mmol/L    BUN 11 5 - 25 mg/dL    Creatinine 0 90 0 60 - 1 30 mg/dL    Glucose, Fasting 77 65 - 99 mg/dL    Calcium 9 4 8 3 - 10 1 mg/dL    Corrected Calcium 10 1 8 3 - 10 1 mg/dL    AST 20 5 - 45 U/L    ALT 22 12 - 78 U/L    Alkaline Phosphatase 45 (L) 46 - 116 U/L    Total Protein 7 2 6 4 - 8 4 g/dL    Albumin 3 1 (L) 3 5 - 5 0 g/dL    Total Bilirubin 0 46 0 20 - 1 00 mg/dL    eGFR 63 ml/min/1 73sq m   Lipid panel    Collection Time: 09/27/22  7:48 AM   Result Value Ref Range    Cholesterol 225 (H) See Comment mg/dL    Triglycerides 73 See Comment mg/dL    HDL, Direct 84 >=50 mg/dL    LDL Calculated 126 (H) 0 - 100 mg/dL Non-HDL-Chol (CHOL-HDL) 141 mg/dl   Hemoglobin A1C    Collection Time: 09/27/22  7:48 AM   Result Value Ref Range    Hemoglobin A1C 5 8 (H) Normal 3 8-5 6%; PreDiabetic 5 7-6 4%; Diabetic >=6 5%; Glycemic control for adults with diabetes <7 0% %     mg/dl   TSH, 3rd generation with Free T4 reflex    Collection Time: 09/27/22  7:48 AM   Result Value Ref Range    TSH 3RD GENERATON 2 850 0 450 - 4 500 uIU/mL       The following portions of the patient's history were reviewed and updated as appropriate: allergies, current medications, past family history, past medical history, past social history, past surgical history and problem list      Review of Systems   Constitutional: Negative for appetite change, chills, diaphoresis, fatigue, fever and unexpected weight change  HENT: Negative for congestion, hearing loss and rhinorrhea  Eyes: Negative for visual disturbance  Respiratory: Negative for cough, chest tightness, shortness of breath and wheezing  Cardiovascular: Negative for chest pain, palpitations and leg swelling  Gastrointestinal: Negative for abdominal pain and blood in stool  Endocrine: Negative for cold intolerance, heat intolerance, polydipsia and polyuria  Genitourinary: Negative for difficulty urinating, dysuria, frequency and urgency  Musculoskeletal: Positive for arthralgias  Negative for myalgias  Skin: Negative for rash  Neurological: Negative for dizziness, weakness, light-headedness and headaches  Hematological: Does not bruise/bleed easily  Psychiatric/Behavioral: Negative for dysphoric mood and sleep disturbance  Objective: There were no vitals filed for this visit  Physical Exam  Constitutional:       Appearance: She is well-developed  HENT:      Head: Normocephalic and atraumatic  Nose: Nose normal    Eyes:      General: No scleral icterus       Conjunctiva/sclera: Conjunctivae normal       Pupils: Pupils are equal, round, and reactive to light    Neck:      Thyroid: No thyromegaly  Vascular: No JVD  Trachea: No tracheal deviation  Cardiovascular:      Rate and Rhythm: Normal rate and regular rhythm  Heart sounds: Murmur heard  No friction rub  No gallop  Pulmonary:      Effort: Pulmonary effort is normal  No respiratory distress  Breath sounds: Normal breath sounds  No wheezing or rales  Musculoskeletal:         General: No deformity  Cervical back: Normal range of motion and neck supple  Lymphadenopathy:      Cervical: No cervical adenopathy  Skin:     General: Skin is warm and dry  Coloration: Skin is not pale  Findings: No erythema or rash  Neurological:      Mental Status: She is alert and oriented to person, place, and time  Cranial Nerves: No cranial nerve deficit  Psychiatric:         Behavior: Behavior normal          Thought Content:  Thought content normal          Judgment: Judgment normal

## 2022-10-18 ENCOUNTER — APPOINTMENT (OUTPATIENT)
Dept: LAB | Facility: CLINIC | Age: 72
End: 2022-10-18
Payer: COMMERCIAL

## 2022-10-19 DIAGNOSIS — M31.6 TEMPORAL ARTERITIS (HCC): ICD-10-CM

## 2022-10-19 RX ORDER — PREDNISONE 5 MG/1
TABLET ORAL
Qty: 100 TABLET | Refills: 3
Start: 2022-10-19 | End: 2023-01-05

## 2022-11-07 ENCOUNTER — APPOINTMENT (OUTPATIENT)
Dept: LAB | Facility: CLINIC | Age: 72
End: 2022-11-07

## 2022-11-09 DIAGNOSIS — E09.9 STEROID-INDUCED DIABETES MELLITUS, INITIAL ENCOUNTER (HCC): Primary | ICD-10-CM

## 2022-11-09 DIAGNOSIS — T38.0X5A STEROID-INDUCED DIABETES MELLITUS, INITIAL ENCOUNTER (HCC): Primary | ICD-10-CM

## 2022-11-09 RX ORDER — LANCETS 33 GAUGE
EACH MISCELLANEOUS
Qty: 100 EACH | Refills: 3 | Status: SHIPPED | OUTPATIENT
Start: 2022-11-09

## 2022-11-09 RX ORDER — BLOOD-GLUCOSE METER
KIT MISCELLANEOUS
Qty: 1 KIT | Refills: 0 | Status: SHIPPED | OUTPATIENT
Start: 2022-11-09

## 2022-11-09 RX ORDER — BLOOD SUGAR DIAGNOSTIC
STRIP MISCELLANEOUS
Qty: 100 EACH | Refills: 3 | Status: SHIPPED | OUTPATIENT
Start: 2022-11-09

## 2022-11-21 ENCOUNTER — APPOINTMENT (OUTPATIENT)
Dept: LAB | Facility: CLINIC | Age: 72
End: 2022-11-21

## 2022-12-04 DIAGNOSIS — M31.6 TEMPORAL ARTERITIS (HCC): ICD-10-CM

## 2022-12-05 ENCOUNTER — APPOINTMENT (OUTPATIENT)
Dept: LAB | Facility: CLINIC | Age: 72
End: 2022-12-05

## 2022-12-05 DIAGNOSIS — M81.0 AGE-RELATED OSTEOPOROSIS WITHOUT CURRENT PATHOLOGICAL FRACTURE: ICD-10-CM

## 2022-12-05 RX ORDER — PREDNISONE 20 MG/1
TABLET ORAL
Qty: 90 TABLET | Refills: 3 | Status: SHIPPED | OUTPATIENT
Start: 2022-12-05

## 2022-12-05 RX ORDER — RISEDRONATE SODIUM 150 MG/1
150 TABLET, FILM COATED ORAL
Qty: 3 TABLET | Refills: 0 | Status: SHIPPED | OUTPATIENT
Start: 2022-12-05

## 2022-12-19 ENCOUNTER — APPOINTMENT (OUTPATIENT)
Dept: LAB | Facility: CLINIC | Age: 72
End: 2022-12-19

## 2022-12-20 ENCOUNTER — TELEPHONE (OUTPATIENT)
Dept: INTERNAL MEDICINE CLINIC | Facility: CLINIC | Age: 72
End: 2022-12-20

## 2022-12-20 NOTE — TELEPHONE ENCOUNTER
----- Message from Thiago Valencia MD sent at 12/20/2022  6:34 AM EST -----  Please schedule f/u w/ me to discuss symptoms and esr

## 2022-12-28 ENCOUNTER — RA CDI HCC (OUTPATIENT)
Dept: OTHER | Facility: HOSPITAL | Age: 72
End: 2022-12-28

## 2022-12-28 NOTE — PROGRESS NOTES
[facility]  coding opportunities       Chart reviewed, no opportunity found:   [address]        Patients Insurance     Medicare Insurance: [employer]

## 2023-01-03 ENCOUNTER — APPOINTMENT (OUTPATIENT)
Dept: LAB | Facility: CLINIC | Age: 73
End: 2023-01-03

## 2023-01-05 ENCOUNTER — OFFICE VISIT (OUTPATIENT)
Dept: INTERNAL MEDICINE CLINIC | Facility: CLINIC | Age: 73
End: 2023-01-05

## 2023-01-05 VITALS
WEIGHT: 132 LBS | DIASTOLIC BLOOD PRESSURE: 70 MMHG | OXYGEN SATURATION: 98 % | HEIGHT: 62 IN | TEMPERATURE: 97.4 F | SYSTOLIC BLOOD PRESSURE: 122 MMHG | BODY MASS INDEX: 24.29 KG/M2 | HEART RATE: 84 BPM

## 2023-01-05 DIAGNOSIS — M31.6 TEMPORAL ARTERITIS (HCC): Primary | ICD-10-CM

## 2023-01-05 DIAGNOSIS — Z79.52 LONG TERM SYSTEMIC STEROID USER: ICD-10-CM

## 2023-01-05 DIAGNOSIS — M70.51 PATELLAR BURSITIS OF RIGHT KNEE: ICD-10-CM

## 2023-01-05 DIAGNOSIS — R53.83 OTHER FATIGUE: ICD-10-CM

## 2023-01-05 RX ORDER — PREDNISONE 20 MG/1
20 TABLET ORAL DAILY
Qty: 90 TABLET | Refills: 3
Start: 2023-01-05

## 2023-01-05 NOTE — PATIENT INSTRUCTIONS
Presumed temporal arteritis based upon symptoms and labs with persistent fatigue and elevated inflammatory markers-continue prednisone 20 mg daily  Check labs as ordered and will follow accordingly

## 2023-01-05 NOTE — PROGRESS NOTES
Assessment/Plan:    Diagnoses and all orders for this visit:    Temporal arteritis ([address] )  -     Mammo screening bilateral w 3d & cad; Future  -     WIN Screen w/ Reflex to Titer/Pattern; Future  -     C-reactive protein; Future  -     CK; Future  -     LD,Blood; Future  -     Uric acid; Future  -     Sedimentation rate, automated; Future  -     Phosphorus; Future  -     Magnesium; Future  -     Basic metabolic panel; Future  -     CBC and differential; Future  -     Hepatic function panel; Future  -     TSH, 3rd generation with Free T4 reflex; Future  -     predniSONE 20 mg tablet; Take 1 tablet (20 mg total) by mouth daily    Long term systemic steroid user  -     Mammo screening bilateral w 3d & cad; Future  -     WIN Screen w/ Reflex to Titer/Pattern; Future  -     C-reactive protein; Future  -     CK; Future  -     LD,Blood; Future  -     Uric acid; Future  -     Sedimentation rate, automated; Future  -     Phosphorus; Future  -     Magnesium; Future  -     Basic metabolic panel; Future  -     CBC and differential; Future  -     Hepatic function panel; Future  -     TSH, 3rd generation with Free T4 reflex; Future    Patellar bursitis of right knee  -     Mammo screening bilateral w 3d & cad; Future  -     WIN Screen w/ Reflex to Titer/Pattern; Future  -     C-reactive protein; Future  -     CK; Future  -     LD,Blood; Future  -     Uric acid; Future  -     Sedimentation rate, automated; Future  -     Phosphorus; Future  -     Magnesium; Future  -     Basic metabolic panel; Future  -     CBC and differential; Future  -     Hepatic function panel; Future  -     TSH, 3rd generation with Free T4 reflex; Future    Other fatigue  -     Mammo screening bilateral w 3d & cad; Future  -     WIN Screen w/ Reflex to Titer/Pattern; Future  -     C-reactive protein; Future  -     CK; Future  -     LD,Blood; Future  -     Uric acid; Future  -     Sedimentation rate, automated; Future  -     Phosphorus;  Future  -     Magnesium; Future  -     Basic metabolic panel; Future  -     CBC and differential; Future  -     Hepatic function panel; Future  -     TSH, 3rd generation with Free T4 reflex; Future            Patient Instructions   Presumed temporal arteritis based upon symptoms and labs with persistent fatigue and elevated inflammatory markers-continue prednisone 20 mg daily  Check labs as ordered and will follow accordingly  Subjective:      Patient ID: Tere Leigh is a 67 y o  female    Follow-up temporal arteritis    Patient had persistently elevated sedimentation rate between 48 and 54  She had been tapered down to 20 mg of prednisone and there was miscommunication regarding increasing dose  She is remained on 20 mg prednisone  Sedimentation rate went from 54 on December 5th-55 2 weeks later and down to 36 earlier this week  Her symptomatology has not changed dramatically over the last month  She is not feeling well  She notes profound fatigue and exercise intolerance  She continues to take her daily walk approximately 1 mile but feels quite fatigued after that  She is noted persistent swelling of the right greater than left knee over the last 1 month  She has tenderness when she kneels on the knee  She denies any recent injury but notes remote meniscal tear approximately 15 years ago to both knees and notes that they typically have swollen intermittently after increased activity but she has not done anything recently to provoke this  She denies any other significant pain  She is managing her bowels with bowel regimen  Breathing stable, no chest pain or palpitations  No rash, headaches or visual disturbance  Current Outpatient Medications:   •  Blood Glucose Monitoring Suppl (OneTouch Verio Reflect) w/Device KIT, Check blood sugars once daily  Please substitute with appropriate alternative as covered by patient's insurance   Dx: E11 65, Disp: 1 kit, Rfl: 0  •  Calcium 500 MG tablet, Take by mouth, Disp: , Rfl:   •  glucose blood (OneTouch Verio) test strip, Check blood sugars once daily  Please substitute with appropriate alternative as covered by patient's insurance  Dx: E11 65, Disp: 100 each, Rfl: 3  •  OneTouch Delica Lancets 62V MISC, Check blood sugars once daily  Please substitute with appropriate alternative as covered by patient's insurance  Dx: E11 65, Disp: 100 each, Rfl: 3  •  predniSONE 20 mg tablet, Take 1 tablet (20 mg total) by mouth daily, Disp: 90 tablet, Rfl: 3  •  Progesterone Micronized 10 % CREA, Place on the skin daily , Disp: , Rfl:   •  risedronate (ACTONEL) 150 MG tablet, Take 1 tablet (150 mg total) by mouth every 30 (thirty) days with water on empty stomach, nothing by mouth or lie down for next 30 minutes  , Disp: 3 tablet, Rfl: 0  •  rosuvastatin (CRESTOR) 10 MG tablet, TAKE 1 TABLET BY MOUTH EVERY DAY, Disp: 90 tablet, Rfl: 1    Recent Results (from the past 1008 hour(s))   Sedimentation rate, automated    Collection Time: 12/05/22  3:43 PM   Result Value Ref Range    Sed Rate 54 (H) 0 - 29 mm/hour   Sedimentation rate, automated    Collection Time: 12/19/22  3:29 PM   Result Value Ref Range    Sed Rate 55 (H) 0 - 29 mm/hour   Sedimentation rate, automated    Collection Time: 01/03/23  3:48 PM   Result Value Ref Range    Sed Rate 36 (H) 0 - 29 mm/hour       The following portions of the patient's history were reviewed and updated as appropriate: allergies, current medications, past family history, past medical history, past social history, past surgical history and problem list      Review of Systems   Constitutional: Positive for fatigue  Negative for appetite change, chills, diaphoresis, fever and unexpected weight change  HENT: Negative for congestion, hearing loss and rhinorrhea  Eyes: Negative for visual disturbance  Respiratory: Negative for cough, chest tightness, shortness of breath and wheezing  Cardiovascular: Negative for chest pain, palpitations and leg swelling  Gastrointestinal: Negative for abdominal pain and blood in stool  Endocrine: Negative for cold intolerance, heat intolerance, polydipsia and polyuria  Genitourinary: Negative for difficulty urinating, dysuria, frequency and urgency  Musculoskeletal: Positive for arthralgias and joint swelling  Negative for myalgias  Skin: Negative for rash  Neurological: Negative for dizziness, weakness, light-headedness and headaches  Hematological: Does not bruise/bleed easily  Psychiatric/Behavioral: Negative for dysphoric mood and sleep disturbance  Objective:      Vitals:    01/05/23 1055   BP: 122/70   Pulse: 84   Temp: (!) 97 4 °F (36 3 °C)   SpO2: 98%          Physical Exam  Constitutional:       Appearance: She is well-developed  HENT:      Head: Normocephalic and atraumatic  Nose: Nose normal    Eyes:      General: No scleral icterus  Conjunctiva/sclera: Conjunctivae normal       Pupils: Pupils are equal, round, and reactive to light  Neck:      Thyroid: No thyromegaly  Vascular: No JVD  Trachea: No tracheal deviation  Cardiovascular:      Rate and Rhythm: Normal rate and regular rhythm  Heart sounds: No murmur heard  No friction rub  No gallop  Pulmonary:      Effort: Pulmonary effort is normal  No respiratory distress  Breath sounds: Normal breath sounds  No wheezing or rales  Abdominal:      General: Bowel sounds are normal  There is no distension  Palpations: Abdomen is soft  There is no mass  Tenderness: There is no abdominal tenderness  There is no guarding or rebound  Musculoskeletal:         General: No tenderness  Cervical back: Normal range of motion and neck supple  Comments: Right knee with full range of motion, no ligamentous laxity, tender over patella with soft tissue swelling, small effusion   Lymphadenopathy:      Cervical: No cervical adenopathy  Skin:     General: Skin is warm and dry        Findings: No erythema or rash    Neurological:      Mental Status: She is alert and oriented to person, place, and time  Cranial Nerves: No cranial nerve deficit  Psychiatric:         Behavior: Behavior normal          Thought Content:  Thought content normal          Judgment: Judgment normal

## 2023-01-06 ENCOUNTER — APPOINTMENT (OUTPATIENT)
Dept: LAB | Facility: HOSPITAL | Age: 73
End: 2023-01-06

## 2023-01-06 DIAGNOSIS — M70.51 PATELLAR BURSITIS OF RIGHT KNEE: ICD-10-CM

## 2023-01-06 DIAGNOSIS — R53.83 OTHER FATIGUE: ICD-10-CM

## 2023-01-06 DIAGNOSIS — Z79.52 LONG TERM SYSTEMIC STEROID USER: ICD-10-CM

## 2023-01-06 DIAGNOSIS — M31.6 TEMPORAL ARTERITIS (HCC): Primary | ICD-10-CM

## 2023-01-06 DIAGNOSIS — M31.6 TEMPORAL ARTERITIS (HCC): ICD-10-CM

## 2023-01-06 LAB
ALBUMIN SERPL BCP-MCNC: 3.5 G/DL (ref 3.5–5)
ALP SERPL-CCNC: 49 U/L (ref 46–116)
ALT SERPL W P-5'-P-CCNC: 19 U/L (ref 12–78)
ANION GAP SERPL CALCULATED.3IONS-SCNC: 7 MMOL/L (ref 4–13)
AST SERPL W P-5'-P-CCNC: 28 U/L (ref 5–45)
BASOPHILS # BLD AUTO: 0.02 THOUSANDS/ÂΜL (ref 0–0.1)
BASOPHILS NFR BLD AUTO: 0 % (ref 0–1)
BILIRUB DIRECT SERPL-MCNC: 0.09 MG/DL (ref 0–0.2)
BILIRUB SERPL-MCNC: 0.35 MG/DL (ref 0.2–1)
BUN SERPL-MCNC: 13 MG/DL (ref 5–25)
CALCIUM SERPL-MCNC: 9.3 MG/DL (ref 8.3–10.1)
CHLORIDE SERPL-SCNC: 103 MMOL/L (ref 96–108)
CK SERPL-CCNC: 89 U/L (ref 26–192)
CO2 SERPL-SCNC: 31 MMOL/L (ref 21–32)
CREAT SERPL-MCNC: 0.85 MG/DL (ref 0.6–1.3)
CRP SERPL QL: 7.5 MG/L
EOSINOPHIL # BLD AUTO: 0.04 THOUSAND/ÂΜL (ref 0–0.61)
EOSINOPHIL NFR BLD AUTO: 0 % (ref 0–6)
ERYTHROCYTE [DISTWIDTH] IN BLOOD BY AUTOMATED COUNT: 13.8 % (ref 11.6–15.1)
GFR SERPL CREATININE-BSD FRML MDRD: 68 ML/MIN/1.73SQ M
GLUCOSE P FAST SERPL-MCNC: 99 MG/DL (ref 65–99)
HCT VFR BLD AUTO: 41.2 % (ref 34.8–46.1)
HGB BLD-MCNC: 12.9 G/DL (ref 11.5–15.4)
IMM GRANULOCYTES # BLD AUTO: 0.07 THOUSAND/UL (ref 0–0.2)
IMM GRANULOCYTES NFR BLD AUTO: 1 % (ref 0–2)
LDH SERPL-CCNC: 246 U/L (ref 81–234)
LYMPHOCYTES # BLD AUTO: 1.01 THOUSANDS/ÂΜL (ref 0.6–4.47)
LYMPHOCYTES NFR BLD AUTO: 8 % (ref 14–44)
MAGNESIUM SERPL-MCNC: 2.4 MG/DL (ref 1.6–2.6)
MCH RBC QN AUTO: 28.1 PG (ref 26.8–34.3)
MCHC RBC AUTO-ENTMCNC: 31.3 G/DL (ref 31.4–37.4)
MCV RBC AUTO: 90 FL (ref 82–98)
MONOCYTES # BLD AUTO: 0.33 THOUSAND/ÂΜL (ref 0.17–1.22)
MONOCYTES NFR BLD AUTO: 3 % (ref 4–12)
NEUTROPHILS # BLD AUTO: 11.6 THOUSANDS/ÂΜL (ref 1.85–7.62)
NEUTS SEG NFR BLD AUTO: 88 % (ref 43–75)
NRBC BLD AUTO-RTO: 0 /100 WBCS
PHOSPHATE SERPL-MCNC: 3.3 MG/DL (ref 2.3–4.1)
PLATELET # BLD AUTO: 292 THOUSANDS/UL (ref 149–390)
PMV BLD AUTO: 9.8 FL (ref 8.9–12.7)
POTASSIUM SERPL-SCNC: 4 MMOL/L (ref 3.5–5.3)
PROT SERPL-MCNC: 7.1 G/DL (ref 6.4–8.4)
RBC # BLD AUTO: 4.59 MILLION/UL (ref 3.81–5.12)
SODIUM SERPL-SCNC: 141 MMOL/L (ref 135–147)
TSH SERPL DL<=0.05 MIU/L-ACNC: 0.99 UIU/ML (ref 0.45–4.5)
URATE SERPL-MCNC: 3.9 MG/DL (ref 2–7.5)
WBC # BLD AUTO: 13.07 THOUSAND/UL (ref 4.31–10.16)

## 2023-01-07 LAB — ANA SER QL IA: NEGATIVE

## 2023-01-17 ENCOUNTER — APPOINTMENT (OUTPATIENT)
Dept: LAB | Facility: HOSPITAL | Age: 73
End: 2023-01-17

## 2023-01-17 DIAGNOSIS — M31.6 TEMPORAL ARTERITIS (HCC): ICD-10-CM

## 2023-01-17 LAB — CRP SERPL QL: <3 MG/L

## 2023-01-18 LAB
LDH SERPL-CCNC: 223 IU/L (ref 119–226)
LDH1 CFR SERPL ELPH: 26 % (ref 17–32)
LDH2 CFR SERPL ELPH: 34 % (ref 25–40)
LDH3 CFR SERPL ELPH: 22 % (ref 17–27)
LDH4 CFR SERPL ELPH: 9 % (ref 5–13)
LDH5 CFR SERPL ELPH: 9 % (ref 4–20)

## 2023-01-19 DIAGNOSIS — M31.6 TEMPORAL ARTERITIS (HCC): ICD-10-CM

## 2023-01-19 RX ORDER — PREDNISONE 20 MG/1
TABLET ORAL
Qty: 90 TABLET | Refills: 3
Start: 2023-01-19 | End: 2023-02-17

## 2023-01-19 RX ORDER — PREDNISONE 5 MG/1
TABLET ORAL
Qty: 100 TABLET | Refills: 1 | Status: SHIPPED | OUTPATIENT
Start: 2023-01-19 | End: 2023-02-17

## 2023-01-31 ENCOUNTER — APPOINTMENT (OUTPATIENT)
Dept: LAB | Facility: HOSPITAL | Age: 73
End: 2023-01-31

## 2023-02-14 ENCOUNTER — APPOINTMENT (OUTPATIENT)
Dept: LAB | Facility: HOSPITAL | Age: 73
End: 2023-02-14

## 2023-02-14 DIAGNOSIS — E78.2 MIXED HYPERLIPIDEMIA: ICD-10-CM

## 2023-02-14 DIAGNOSIS — R73.03 PREDIABETES: ICD-10-CM

## 2023-02-14 DIAGNOSIS — I10 ESSENTIAL HYPERTENSION: ICD-10-CM

## 2023-02-14 DIAGNOSIS — Z79.52 LONG TERM SYSTEMIC STEROID USER: ICD-10-CM

## 2023-02-17 DIAGNOSIS — M31.6 TEMPORAL ARTERITIS (HCC): ICD-10-CM

## 2023-02-17 RX ORDER — PREDNISONE 20 MG/1
TABLET ORAL
Qty: 90 TABLET | Refills: 3 | Status: SHIPPED | OUTPATIENT
Start: 2023-02-17 | End: 2023-05-24

## 2023-03-02 ENCOUNTER — APPOINTMENT (OUTPATIENT)
Dept: LAB | Facility: HOSPITAL | Age: 73
End: 2023-03-02

## 2023-03-04 DIAGNOSIS — E78.2 MIXED HYPERLIPIDEMIA: ICD-10-CM

## 2023-03-04 RX ORDER — ROSUVASTATIN CALCIUM 10 MG/1
TABLET, COATED ORAL
Qty: 90 TABLET | Refills: 1 | Status: SHIPPED | OUTPATIENT
Start: 2023-03-04

## 2023-03-07 ENCOUNTER — HOSPITAL ENCOUNTER (OUTPATIENT)
Dept: NON INVASIVE DIAGNOSTICS | Facility: CLINIC | Age: 73
Discharge: HOME | End: 2023-03-07

## 2023-03-07 VITALS
HEART RATE: 75 BPM | HEIGHT: 62 IN | WEIGHT: 132 LBS | SYSTOLIC BLOOD PRESSURE: 122 MMHG | DIASTOLIC BLOOD PRESSURE: 70 MMHG | BODY MASS INDEX: 24.29 KG/M2

## 2023-03-07 DIAGNOSIS — I34.0 NONRHEUMATIC MITRAL VALVE REGURGITATION: ICD-10-CM

## 2023-03-07 LAB
AORTIC ROOT: 2.7 CM
APICAL FOUR CHAMBER EJECTION FRACTION: 60 %
AV LVOT MEAN GRADIENT: 4 MMHG
AV LVOT PEAK GRADIENT: 5 MMHG
AV PEAK GRADIENT: 14 MMHG
DOP CALC LVOT PEAK VEL VTI: 26.79 CM
DOP CALC LVOT PEAK VEL: 1.15 M/S
E WAVE DECELERATION TIME: 176 MS
FRACTIONAL SHORTENING: 44 % (ref 28–44)
INTERVENTRICULAR SEPTUM IN DIASTOLE (PARASTERNAL SHORT AXIS VIEW): 1 CM
INTERVENTRICULAR SEPTUM: 1 CM (ref 0.6–1.1)
LAAS-AP2: 17.1 CM2
LAAS-AP4: 14.1 CM2
LEFT ATRIUM AREA SYSTOLE SINGLE PLANE A4C: 13.8 CM2
LEFT ATRIUM SIZE: 3.8 CM
LEFT INTERNAL DIMENSION IN SYSTOLE: 2.4 CM (ref 2.1–4)
LEFT VENTRICULAR INTERNAL DIMENSION IN DIASTOLE: 4.3 CM (ref 3.5–6)
LEFT VENTRICULAR POSTERIOR WALL IN END DIASTOLE: 0.8 CM
LEFT VENTRICULAR STROKE VOLUME: 62 ML
LVSV (TEICH): 62 ML
MITRAL REGURGITATION PEAK VELOCITY: 5.5 M/S
MITRAL VALVE MEAN INFLOW VELOCITY: 4.69 M/S
MITRAL VALVE REGURGITANT PEAK GRADIENT: 121 MMHG
MV E'TISSUE VEL-LAT: 8 CM/S
MV PEAK A VEL: 0.97 M/S
MV PEAK E VEL: 117 CM/S
MV STENOSIS PRESSURE HALF TIME: 51 MS
MV VALVE AREA P 1/2 METHOD: 4.31 CM2
RIGHT ATRIUM AREA SYSTOLE A4C: 10.3 CM2
RIGHT VENTRICLE ID DIMENSION: 3.4 CM
SL CV DOP CALC MV VTI RETROGRADE: 204.1 CM
SL CV LEFT ATRIUM LENGTH A2C: 5 CM
SL CV LV EF: 60
SL CV MV MEAN GRADIENT RETROGRADE: 94 MMHG
SL CV PED ECHO LEFT VENTRICLE DIASTOLIC VOLUME (MOD BIPLANE) 2D: 83 ML
SL CV PED ECHO LEFT VENTRICLE SYSTOLIC VOLUME (MOD BIPLANE) 2D: 21 ML
TR MAX PG: 27 MMHG
TR PEAK VELOCITY: 2.6 M/S
TRICUSPID ANNULAR PLANE SYSTOLIC EXCURSION: 2.2 CM
TRICUSPID VALVE PEAK REGURGITATION VELOCITY: 2.59 M/S

## 2023-03-16 ENCOUNTER — APPOINTMENT (OUTPATIENT)
Dept: LAB | Facility: HOSPITAL | Age: 73
End: 2023-03-16

## 2023-03-16 LAB
ALBUMIN SERPL BCP-MCNC: 4.1 G/DL (ref 3.5–5)
ALP SERPL-CCNC: 43 U/L (ref 34–104)
ALT SERPL W P-5'-P-CCNC: 14 U/L (ref 7–52)
ANION GAP SERPL CALCULATED.3IONS-SCNC: 7 MMOL/L (ref 4–13)
AST SERPL W P-5'-P-CCNC: 23 U/L (ref 13–39)
BASOPHILS # BLD AUTO: 0.03 THOUSANDS/ÂΜL (ref 0–0.1)
BASOPHILS NFR BLD AUTO: 0 % (ref 0–1)
BILIRUB SERPL-MCNC: 0.44 MG/DL (ref 0.2–1)
BUN SERPL-MCNC: 13 MG/DL (ref 5–25)
CALCIUM SERPL-MCNC: 9.7 MG/DL (ref 8.4–10.2)
CHLORIDE SERPL-SCNC: 102 MMOL/L (ref 96–108)
CHOLEST SERPL-MCNC: 218 MG/DL
CO2 SERPL-SCNC: 31 MMOL/L (ref 21–32)
CREAT SERPL-MCNC: 0.82 MG/DL (ref 0.6–1.3)
EOSINOPHIL # BLD AUTO: 0.01 THOUSAND/ÂΜL (ref 0–0.61)
EOSINOPHIL NFR BLD AUTO: 0 % (ref 0–6)
ERYTHROCYTE [DISTWIDTH] IN BLOOD BY AUTOMATED COUNT: 15.4 % (ref 11.6–15.1)
EST. AVERAGE GLUCOSE BLD GHB EST-MCNC: 123 MG/DL
GFR SERPL CREATININE-BSD FRML MDRD: 71 ML/MIN/1.73SQ M
GLUCOSE P FAST SERPL-MCNC: 150 MG/DL (ref 65–99)
HBA1C MFR BLD: 5.9 %
HCT VFR BLD AUTO: 43.4 % (ref 34.8–46.1)
HDLC SERPL-MCNC: 77 MG/DL
HGB BLD-MCNC: 13.9 G/DL (ref 11.5–15.4)
IMM GRANULOCYTES # BLD AUTO: 0.05 THOUSAND/UL (ref 0–0.2)
IMM GRANULOCYTES NFR BLD AUTO: 0 % (ref 0–2)
LDLC SERPL CALC-MCNC: 122 MG/DL (ref 0–100)
LYMPHOCYTES # BLD AUTO: 1.24 THOUSANDS/ÂΜL (ref 0.6–4.47)
LYMPHOCYTES NFR BLD AUTO: 10 % (ref 14–44)
MCH RBC QN AUTO: 27.8 PG (ref 26.8–34.3)
MCHC RBC AUTO-ENTMCNC: 32 G/DL (ref 31.4–37.4)
MCV RBC AUTO: 87 FL (ref 82–98)
MONOCYTES # BLD AUTO: 0.16 THOUSAND/ÂΜL (ref 0.17–1.22)
MONOCYTES NFR BLD AUTO: 1 % (ref 4–12)
NEUTROPHILS # BLD AUTO: 10.47 THOUSANDS/ÂΜL (ref 1.85–7.62)
NEUTS SEG NFR BLD AUTO: 89 % (ref 43–75)
NONHDLC SERPL-MCNC: 141 MG/DL
NRBC BLD AUTO-RTO: 0 /100 WBCS
PLATELET # BLD AUTO: 263 THOUSANDS/UL (ref 149–390)
PMV BLD AUTO: 9.8 FL (ref 8.9–12.7)
POTASSIUM SERPL-SCNC: 4.4 MMOL/L (ref 3.5–5.3)
PROT SERPL-MCNC: 7.1 G/DL (ref 6.4–8.4)
RBC # BLD AUTO: 5 MILLION/UL (ref 3.81–5.12)
SODIUM SERPL-SCNC: 140 MMOL/L (ref 135–147)
TRIGL SERPL-MCNC: 94 MG/DL
WBC # BLD AUTO: 11.96 THOUSAND/UL (ref 4.31–10.16)

## 2023-03-17 DIAGNOSIS — M81.0 AGE-RELATED OSTEOPOROSIS WITHOUT CURRENT PATHOLOGICAL FRACTURE: ICD-10-CM

## 2023-03-17 RX ORDER — RISEDRONATE SODIUM 150 MG/1
150 TABLET, FILM COATED ORAL
Qty: 3 TABLET | Refills: 0 | Status: SHIPPED | OUTPATIENT
Start: 2023-03-17

## 2023-03-20 ENCOUNTER — TELEPHONE (OUTPATIENT)
Dept: INTERNAL MEDICINE CLINIC | Facility: CLINIC | Age: 73
End: 2023-03-20

## 2023-03-20 NOTE — TELEPHONE ENCOUNTER
----- Message from Edwige Ruff MD sent at 3/16/2023  6:23 PM EDT -----  Regarding: FW: recent test results  Contact: [phone]  Can you look into the lab issue?      ----- Message -----  From: Priscila Hairston  Sent: 3/16/2023   5:41 PM EDT  To: Edwige Ruff MD  Subject: FW: recent test results                            ----- Message -----  From: Karena Tilley  Sent: 3/16/2023   5:06 PM EDT  To: [facility]  Subject: recent test results                              It looks like someone screwed up and ran extra tests which are not due for another month and did not follow fasting protocols, etc  Will they need to be repeated?

## 2023-03-20 NOTE — TELEPHONE ENCOUNTER
NO To be done date - ordered by resident  That's why lab jamar  If patient gets a bill she should call me

## 2023-04-24 ENCOUNTER — OFFICE VISIT (OUTPATIENT)
Age: 73
End: 2023-04-24

## 2023-04-24 VITALS
OXYGEN SATURATION: 99 % | WEIGHT: 135 LBS | SYSTOLIC BLOOD PRESSURE: 132 MMHG | HEIGHT: 62 IN | BODY MASS INDEX: 24.84 KG/M2 | HEART RATE: 84 BPM | DIASTOLIC BLOOD PRESSURE: 76 MMHG

## 2023-04-24 DIAGNOSIS — R73.01 IMPAIRED FASTING GLUCOSE: Primary | ICD-10-CM

## 2023-04-24 DIAGNOSIS — Z96.642 STATUS POST TOTAL HIP REPLACEMENT, LEFT: ICD-10-CM

## 2023-04-24 DIAGNOSIS — I10 PRIMARY HYPERTENSION: ICD-10-CM

## 2023-04-24 DIAGNOSIS — M31.6 TEMPORAL ARTERITIS (HCC): ICD-10-CM

## 2023-04-24 DIAGNOSIS — E78.2 MIXED HYPERLIPIDEMIA: ICD-10-CM

## 2023-04-24 DIAGNOSIS — D69.6 THROMBOCYTOPENIA (HCC): ICD-10-CM

## 2023-04-24 DIAGNOSIS — M16.12 ARTHRITIS OF LEFT HIP: ICD-10-CM

## 2023-04-24 DIAGNOSIS — Z79.52 LONG TERM SYSTEMIC STEROID USER: ICD-10-CM

## 2023-04-24 DIAGNOSIS — R60.0 EDEMA OF LEFT LOWER EXTREMITY: ICD-10-CM

## 2023-04-24 NOTE — ASSESSMENT & PLAN NOTE
Pressures optimal  Previously on diovan , stopped due to hypotension with prednisone use   Currently not on any medication

## 2023-04-24 NOTE — ASSESSMENT & PLAN NOTE
Lab Results   Component Value Date    CHOLESTEROL 218 (H) 03/16/2023    TRIG 94 03/16/2023    HDL 77 03/16/2023    LDLCALC 122 (H) 03/16/2023     On rosuvastatin

## 2023-04-24 NOTE — PROGRESS NOTES
Assessment and Plan:     Problem List Items Addressed This Visit        Endocrine    Impaired fasting glucose - Primary     Lab Results   Component Value Date    HGBA1C 5 9 (H) 03/16/2023     Stable  On steroids         Relevant Orders    CBC and differential    Comprehensive metabolic panel    Lipid Panel with Direct LDL reflex    HEMOGLOBIN A1C W/ EAG ESTIMATION    TSH, 3rd generation with Free T4 reflex       Cardiovascular and Mediastinum    Hypertension     Pressures optimal  Previously on diovan , stopped due to hypotension with prednisone use   Currently not on any medication            Relevant Orders    CBC and differential    Comprehensive metabolic panel    Lipid Panel with Direct LDL reflex    HEMOGLOBIN A1C W/ EAG ESTIMATION    TSH, 3rd generation with Free T4 reflex    Temporal arteritis (HCC)     Left side  On prednisone taper , currently on 17 5mg daily  Feels better, improving          Relevant Orders    CBC and differential    Comprehensive metabolic panel    Lipid Panel with Direct LDL reflex    HEMOGLOBIN A1C W/ EAG ESTIMATION    TSH, 3rd generation with Free T4 reflex       Musculoskeletal and Integument    Arthritis of left hip    Relevant Orders    CBC and differential    Comprehensive metabolic panel    Lipid Panel with Direct LDL reflex    HEMOGLOBIN A1C W/ EAG ESTIMATION       Hematopoietic and Hemostatic    Thrombocytopenia (HCC)     Stable  We will monitor          Relevant Orders    CBC and differential    Comprehensive metabolic panel    Lipid Panel with Direct LDL reflex    HEMOGLOBIN A1C W/ EAG ESTIMATION       Other    Hyperlipidemia     Lab Results   Component Value Date    CHOLESTEROL 218 (H) 03/16/2023    TRIG 94 03/16/2023    HDL 77 03/16/2023    LDLCALC 122 (H) 03/16/2023     On rosuvastatin          Relevant Orders    CBC and differential    Comprehensive metabolic panel    Lipid Panel with Direct LDL reflex    HEMOGLOBIN A1C W/ EAG ESTIMATION    Status post total hip replacement, left     Doing okay   C/o of pain on left hip , likely muscle spasm     Advised Deep massage           Relevant Orders    DXA bone density spine hip and pelvis    Edema of left lower extremity     Advised avoiding prolonged sitting/standing  Use compression socks as needed          Relevant Orders    CBC and differential    Comprehensive metabolic panel    Lipid Panel with Direct LDL reflex    HEMOGLOBIN A1C W/ EAG ESTIMATION    Long term systemic steroid user    Relevant Orders    CBC and differential    Comprehensive metabolic panel    Lipid Panel with Direct LDL reflex    HEMOGLOBIN A1C W/ EAG ESTIMATION       Depression Screening and Follow-up Plan: Patient was screened for depression during today's encounter  They screened negative with a PHQ-2 score of 0  Preventive health issues were discussed with patient, and age appropriate screening tests were ordered as noted in patient's After Visit Summary  Personalized health advice and appropriate referrals for health education or preventive services given if needed, as noted in patient's After Visit Summary  History of Present Illness:     Patient presents for a Medicare Wellness Visit    Patient is here for follow up     She has temporal arteritis, on L, on prednisone taper, improving slowly  No side effect of medication  walking a mile couple times a week, does yard work in between   recent labs reviewed   She c/o lateral knee cystic swelling on right knee, non tender  We will monitor it, if it grows in size or become tender we can do ultrasound     We will check ESR monthly while she is on prednisone taper     We will do other labs in 6 months  We will order her Dexa scan as well      Patient Care Team:  Chary Read MD as PCP - Crow Aguilar MD as PCP - [address] [place] [address] (RTE)  [facility]  MD Gamal Gautam MD     Review of Systems:     Review of Systems Constitutional: Negative for activity change, chills, fatigue and fever  Respiratory: Negative for cough and shortness of breath  Cardiovascular: Positive for leg swelling  Negative for chest pain and palpitations  Gastrointestinal: Negative for abdominal pain, constipation and diarrhea  Genitourinary: Negative for dysuria and frequency  Musculoskeletal: Negative for arthralgias, back pain and neck pain  Neurological: Negative for dizziness, weakness and light-headedness  Psychiatric/Behavioral: Negative for agitation and confusion          Problem List:     Patient Active Problem List   Diagnosis   • Hyperlipidemia   • Hypertension   • Microhematuria   • Personal history of breast cancer   • Renal cyst   • Thrombocytopenia (HCC)   • Arthritis of left hip   • Status post total hip replacement, left   • Edema of left lower extremity   • Screening mammogram for high-risk patient   • Impaired fasting glucose   • Myalgia   • Temporal arteritis (HCC)   • Long term systemic steroid user      Past Medical and Surgical History:     Past Medical History:   Diagnosis Date   • Breast cancer ([facility] [address] ) 03/01/1993    right   • Hematuria     LAST ASSESSED [id number]     Past Surgical History:   Procedure Laterality Date   • BREAST LUMPECTOMY Right 04/01/1993   • COLONOSCOPY     • FL INJECTION LEFT HIP (NON ARTHROGRAM)  1/19/2021   • LYMPH NODE BIOPSY     • MOHS SURGERY     • UT ARTHRP ACETBLR/PROX FEM PROSTC AGRFT/ALGRFT Left 6/3/2021    Procedure: ARTHROPLASTY HIP TOTAL;  Surgeon: Jonn Graff MD;  Location: [facility];  Service: Orthopedics      Family History:     Family History   Problem Relation Age of Onset   • Breast cancer Mother 79   • No Known Problems Sister    • No Known Problems Daughter    • No Known Problems Sister    • No Known Problems Sister    • No Known Problems Sister    • No Known Problems Daughter       Social History:     Social History     Socioeconomic History   • Marital status: /Civil [employer]     Spouse name: None   • Number of children: None   • Years of education: None   • Highest education level: None   Occupational History   • None   Tobacco Use   • Smoking status: Never   • Smokeless tobacco: Never   Vaping Use   • Vaping Use: Never used   Substance and Sexual Activity   • Alcohol use: Yes   • Drug use: No   • Sexual activity: Not Currently   Other Topics Concern   • None   Social History Narrative    NO ADVANCED DIRECTIVES      Social Determinants of Health     Financial Resource Strain: Low Risk    • Difficulty of Paying Living Expenses: Not hard at all   Food Insecurity: Not on file   Transportation Needs: No Transportation Needs   • Lack of Transportation (Medical): No   • Lack of Transportation (Non-Medical): No   Physical Activity: Sufficiently Active   • Days of Exercise per Week: 7 days   • Minutes of Exercise per Session: 30 min   Stress: Not on file   Social Connections: Not on file   Intimate Partner Violence: Not on file   Housing Stability: Not on file      Medications and Allergies:     Current Outpatient Medications   Medication Sig Dispense Refill   • Calcium 500 MG tablet Take by mouth     • predniSONE 20 mg tablet 20 mg daily (Patient taking differently: 17 5mg daily) 90 tablet 3   • Progesterone Micronized 10 % CREA Place on the skin daily      • risedronate (ACTONEL) 150 MG tablet Take 1 tablet (150 mg total) by mouth every 30 (thirty) days with water on empty stomach, nothing by mouth or lie down for next 30 minutes  3 tablet 0   • rosuvastatin (CRESTOR) 10 MG tablet TAKE 1 TABLET BY MOUTH EVERY DAY 90 tablet 1     No current facility-administered medications for this visit       No Known Allergies   Immunizations:     Immunization History   Administered Date(s) Administered   • COVID-19 PFIZER VACCINE 0 3 ML IM 03/10/2021, 04/01/2021, 12/10/2021   • INFLUENZA 12/17/2018, 10/22/2020, 12/01/2022   • Influenza Split High Dose Preservative Free IM 12/17/2018   • Influenza, seasonal, injectable 1950   • Pneumococcal Conjugate 13-Valent 12/09/2015   • Pneumococcal Polysaccharide PPV23 07/20/2017   • Tdap 1950   • Zoster 01/01/2012   • Zoster Vaccine Recombinant 01/01/2012, 10/22/2020   • influenza, trivalent, adjuvanted 11/12/2019      Health Maintenance:         Topic Date Due   • Breast Cancer Screening: Mammogram  01/27/2023   • Colorectal Cancer Screening  06/18/2023   • DXA SCAN  11/22/2023   • Hepatitis C Screening  Completed         Topic Date Due   • COVID-19 Vaccine (4 - Booster for Jeronimo Russell series) 02/04/2022      Medicare Screening Tests and Risk Assessments:     Alex Crain is here for her Subsequent Wellness visit  Health Risk Assessment:   Patient rates overall health as good  Patient feels that their physical health rating is slightly worse  Patient is satisfied with their life  Eyesight was rated as slightly worse  Hearing was rated as same  Patient feels that their emotional and mental health rating is slightly worse  Patients states they are sometimes angry  Patient states they are sometimes unusually tired/fatigued  Pain experienced in the last 7 days has been none  Patient states that she has experienced weight loss or gain in last 6 months  Fall Risk Screening: In the past year, patient has experienced: no history of falling in past year      Urinary Incontinence Screening:   Patient has leaked urine accidently in the last six months  Home Safety:  Patient has trouble with stairs inside or outside of their home  Patient has working smoke alarms and has no working carbon monoxide detector  Home safety hazards include: medications that cause fatigue  Nutrition:   Current diet is Limited junk food  Medications:   Patient is currently taking over-the-counter supplements  OTC medications include: multivitamin, collagen, fiber gummies  Patient is able to manage medications       Activities of Daily Living (ADLs)/Instrumental Activities of Daily Living (IADLs):   Walk and transfer into and out of bed and chair?: Yes  Dress and groom yourself?: Yes    Bathe or shower yourself?: Yes    Feed yourself? Yes  Do your laundry/housekeeping?: Yes  Manage your money, pay your bills and track your expenses?: Yes  Make your own meals?: Yes    Do your own shopping?: Yes    Previous Hospitalizations:   Any hospitalizations or ED visits within the last 12 months?: No      Advance Care Planning:   Living will: Yes    Durable POA for healthcare: Yes    Advanced directive: Yes      PREVENTIVE SCREENINGS      Cardiovascular Screening:    General: Screening Not Indicated and History Lipid Disorder      Diabetes Screening:     General: Screening Not Indicated and History Diabetes      Colorectal Cancer Screening:     General: Screening Current      Breast Cancer Screening:     General: History Breast Cancer      Cervical Cancer Screening:    General: Screening Not Indicated      Osteoporosis Screening:    General: Screening Not Indicated and History Osteoporosis      Lung Cancer Screening:     General: Screening Not Indicated      Hepatitis C Screening:    General: Screening Current    Screening, Brief Intervention, and Referral to Treatment (SBIRT)    Screening  Typical number of drinks in a day: 1  Typical number of drinks in a week: 7  Interpretation: Low risk drinking behavior  AUDIT-C Screenin) How often did you have a drink containing alcohol in the past year? 4 or more times a week  2) How many drinks did you have on a typical day when you were drinking in the past year? 1 to 2  3) How often did you have 6 or more drinks on one occasion in the past year? never    AUDIT-C Score: 4  Interpretation: Score 3-12 (female): POSITIVE screen for alcohol misuse    AUDIT Screenin) How often during the last year have you found that you were not able to stop drinking once you had started?  0 - never  5) How often during the last year have you failed to do what "was normally expected from you because of drinking? 0 - never  6) How often during the last year have you needed a first drink in the morning to get yourself going after a heavy drinking session? 0 - never  7) How often during the last year have you had a feeling of guilt or remorse after drinking? 0 - never  8) How often during the last year have you been unable to remember what happened the night before because you had been drinking? 0 - never  9) Have you or someone else been injured as a result of your drinking? 0 - no  10) Has a relative or friend or a doctor or another health worker been concerned about your drinking or suggested you cut down? 0 - no    AUDIT Score: 4  Interpretation: Low risk alcohol consumption    Single Item Drug Screening:  How often have you used an illegal drug (including marijuana) or a prescription medication for non-medical reasons in the past year? never    Single Item Drug Screen Score: 0  Interpretation: Negative screen for possible drug use disorder    No results found  Physical Exam:     /76 (BP Location: Left arm)   Pulse 84   Ht 5' 2\" (1 575 m)   Wt 61 2 kg (135 lb)   SpO2 99%   BMI 24 69 kg/m²     Physical Exam  Vitals reviewed  Constitutional:       Appearance: Normal appearance  HENT:      Head: Atraumatic  Mouth/Throat:      Mouth: Mucous membranes are moist    Eyes:      General: No scleral icterus  Cardiovascular:      Rate and Rhythm: Normal rate and regular rhythm  Heart sounds: Murmur heard  Pulmonary:      Effort: No respiratory distress  Breath sounds: Normal breath sounds  Abdominal:      General: Bowel sounds are normal    Musculoskeletal:      Comments: Trace pedal edema bilaterally    Skin:     General: Skin is warm and dry  Capillary Refill: Capillary refill takes less than 2 seconds  Neurological:      Mental Status: She is oriented to person, place, and time  Mental status is at baseline     Psychiatric:         " Mood and Affect: Mood normal           Mahin Marshall MD

## 2023-05-10 ENCOUNTER — APPOINTMENT (OUTPATIENT)
Dept: LAB | Facility: HOSPITAL | Age: 73
End: 2023-05-10
Attending: INTERNAL MEDICINE

## 2023-05-24 DIAGNOSIS — M31.6 TEMPORAL ARTERITIS (HCC): ICD-10-CM

## 2023-05-24 RX ORDER — PREDNISONE 10 MG/1
TABLET ORAL
Qty: 90 TABLET | Refills: 2 | Status: SHIPPED | OUTPATIENT
Start: 2023-05-24

## 2023-06-06 ENCOUNTER — HOSPITAL ENCOUNTER (OUTPATIENT)
Dept: MAMMOGRAPHY | Facility: CLINIC | Age: 73
Discharge: HOME | End: 2023-06-06
Payer: COMMERCIAL

## 2023-06-06 ENCOUNTER — APPOINTMENT (OUTPATIENT)
Dept: LAB | Facility: HOSPITAL | Age: 73
End: 2023-06-06
Payer: COMMERCIAL

## 2023-06-06 VITALS — WEIGHT: 135 LBS | HEIGHT: 62 IN | BODY MASS INDEX: 24.84 KG/M2

## 2023-06-06 DIAGNOSIS — Z79.52 LONG TERM SYSTEMIC STEROID USER: ICD-10-CM

## 2023-06-06 DIAGNOSIS — M31.6 TEMPORAL ARTERITIS (HCC): ICD-10-CM

## 2023-06-06 DIAGNOSIS — M70.51 PATELLAR BURSITIS OF RIGHT KNEE: ICD-10-CM

## 2023-06-06 DIAGNOSIS — R53.83 OTHER FATIGUE: ICD-10-CM

## 2023-06-06 PROCEDURE — 77067 SCR MAMMO BI INCL CAD: CPT

## 2023-06-06 PROCEDURE — 77063 BREAST TOMOSYNTHESIS BI: CPT

## 2023-06-14 DIAGNOSIS — M81.0 AGE-RELATED OSTEOPOROSIS WITHOUT CURRENT PATHOLOGICAL FRACTURE: ICD-10-CM

## 2023-06-14 RX ORDER — RISEDRONATE SODIUM 150 MG/1
TABLET, FILM COATED ORAL
Qty: 3 TABLET | Refills: 0 | Status: SHIPPED | OUTPATIENT
Start: 2023-06-14

## 2023-07-07 ENCOUNTER — APPOINTMENT (OUTPATIENT)
Dept: LAB | Facility: HOSPITAL | Age: 73
End: 2023-07-07
Payer: COMMERCIAL

## 2023-07-28 ENCOUNTER — APPOINTMENT (OUTPATIENT)
Dept: LAB | Facility: HOSPITAL | Age: 73
End: 2023-07-28
Attending: INTERNAL MEDICINE
Payer: COMMERCIAL

## 2023-08-18 ENCOUNTER — APPOINTMENT (OUTPATIENT)
Dept: LAB | Facility: CLINIC | Age: 73
End: 2023-08-18
Payer: COMMERCIAL

## 2023-08-18 DIAGNOSIS — Z79.52 LONG TERM SYSTEMIC STEROID USER: ICD-10-CM

## 2023-08-18 DIAGNOSIS — M31.6 TEMPORAL ARTERITIS (HCC): Primary | ICD-10-CM

## 2023-08-18 DIAGNOSIS — R60.0 EDEMA OF LEFT LOWER EXTREMITY: ICD-10-CM

## 2023-08-18 DIAGNOSIS — R73.01 IMPAIRED FASTING GLUCOSE: ICD-10-CM

## 2023-08-18 DIAGNOSIS — M16.12 ARTHRITIS OF LEFT HIP: ICD-10-CM

## 2023-08-18 DIAGNOSIS — E78.2 MIXED HYPERLIPIDEMIA: ICD-10-CM

## 2023-08-18 DIAGNOSIS — I10 PRIMARY HYPERTENSION: ICD-10-CM

## 2023-08-18 DIAGNOSIS — D69.6 THROMBOCYTOPENIA (HCC): ICD-10-CM

## 2023-08-18 DIAGNOSIS — M31.6 TEMPORAL ARTERITIS (HCC): ICD-10-CM

## 2023-08-18 LAB — ERYTHROCYTE [SEDIMENTATION RATE] IN BLOOD: 50 MM/HOUR (ref 0–29)

## 2023-08-18 PROCEDURE — 36415 COLL VENOUS BLD VENIPUNCTURE: CPT

## 2023-08-18 PROCEDURE — 85652 RBC SED RATE AUTOMATED: CPT

## 2023-08-20 DIAGNOSIS — M31.6 TEMPORAL ARTERITIS (HCC): Primary | ICD-10-CM

## 2023-08-28 DIAGNOSIS — E78.2 MIXED HYPERLIPIDEMIA: ICD-10-CM

## 2023-08-28 RX ORDER — ROSUVASTATIN CALCIUM 10 MG/1
TABLET, COATED ORAL
Qty: 90 TABLET | Refills: 1 | Status: SHIPPED | OUTPATIENT
Start: 2023-08-28

## 2023-09-12 ENCOUNTER — APPOINTMENT (OUTPATIENT)
Dept: LAB | Facility: HOSPITAL | Age: 73
End: 2023-09-12
Payer: COMMERCIAL

## 2023-09-12 LAB — ERYTHROCYTE [SEDIMENTATION RATE] IN BLOOD: 31 MM/HOUR (ref 0–29)

## 2023-09-12 PROCEDURE — 85652 RBC SED RATE AUTOMATED: CPT | Performed by: INTERNAL MEDICINE

## 2023-09-12 PROCEDURE — 36415 COLL VENOUS BLD VENIPUNCTURE: CPT | Performed by: INTERNAL MEDICINE

## 2023-09-13 ENCOUNTER — HOSPITAL ENCOUNTER (OUTPATIENT)
Dept: RADIOLOGY | Facility: HOSPITAL | Age: 73
Discharge: HOME | End: 2023-09-13
Payer: COMMERCIAL

## 2023-09-13 ENCOUNTER — HOSPITAL ENCOUNTER (OUTPATIENT)
Dept: VASCULAR ULTRASOUND | Facility: HOSPITAL | Age: 73
Discharge: HOME | End: 2023-09-13
Attending: INTERNAL MEDICINE
Payer: COMMERCIAL

## 2023-09-13 ENCOUNTER — OFFICE VISIT (OUTPATIENT)
Age: 73
End: 2023-09-13
Payer: COMMERCIAL

## 2023-09-13 ENCOUNTER — TELEPHONE (OUTPATIENT)
Age: 73
End: 2023-09-13

## 2023-09-13 VITALS
SYSTOLIC BLOOD PRESSURE: 124 MMHG | HEIGHT: 62 IN | BODY MASS INDEX: 25.21 KG/M2 | DIASTOLIC BLOOD PRESSURE: 68 MMHG | HEART RATE: 95 BPM | OXYGEN SATURATION: 96 % | WEIGHT: 137 LBS

## 2023-09-13 DIAGNOSIS — M79.89 LEG SWELLING: ICD-10-CM

## 2023-09-13 DIAGNOSIS — M79.89 LEG SWELLING: Primary | ICD-10-CM

## 2023-09-13 PROCEDURE — 99213 OFFICE O/P EST LOW 20 MIN: CPT | Performed by: INTERNAL MEDICINE

## 2023-09-13 PROCEDURE — 73564 X-RAY EXAM KNEE 4 OR MORE: CPT

## 2023-09-13 PROCEDURE — 93971 EXTREMITY STUDY: CPT

## 2023-09-13 NOTE — PROGRESS NOTES
Assessment/Plan:    Diagnoses and all orders for this visit:    Leg swelling  -     VAS lower limb venous duplex study, unilateral/limited; Future  -     XR knee 4+ vw left injury; Future              Patient Instructions   Check stat ultrasound to rule out deep vein thrombosis, if negative work-up knee with x-ray and consideration for orthopedic evaluation. Subjective:      Patient ID: Mardene Dakin is a 75 y.o. female    Patient presents acutely for evaluation of left lower extremity swelling and stiffness. She woke up with swollen and stiff left lower extremity 2 days ago and symptoms have not improved. She has no significant pain. No chest pain, shortness of breath, fevers, chills. She denies any injury or overuse to the leg. No prior history of blood clots. Current Outpatient Medications:   •  Calcium 500 MG tablet, Take by mouth, Disp: , Rfl:   •  predniSONE 10 mg tablet, 17.5 mg daily, taper as directed. (Patient taking differently:  Alternating 10mg and 12.5mg.), Disp: 90 tablet, Rfl: 2  •  Progesterone Micronized 10 % CREA, Place on the skin daily , Disp: , Rfl:   •  risedronate (ACTONEL) 150 MG tablet, TAKE 1 TABLET BY MOUTH EVERY 30 DAYS WITH WATER ON EMPTY STOMACH, NOTHING BY MOUTH OR LIE DOWN FOR NEXT 30 MINUTES., Disp: 3 tablet, Rfl: 0  •  rosuvastatin (CRESTOR) 10 MG tablet, TAKE 1 TABLET BY MOUTH EVERY DAY, Disp: 90 tablet, Rfl: 1    Recent Results (from the past 1008 hour(s))   Sedimentation rate, automated    Collection Time: 08/18/23 12:45 PM   Result Value Ref Range    Sed Rate 50 (H) 0 - 29 mm/hour   Sedimentation rate, automated    Collection Time: 09/12/23 11:00 AM   Result Value Ref Range    Sed Rate 31 (H) 0 - 29 mm/hour       The following portions of the patient's history were reviewed and updated as appropriate: allergies, current medications, past family history, past medical history, past social history, past surgical history and problem list.     Review of Systems Constitutional: Negative for appetite change, chills, diaphoresis, fatigue, fever and unexpected weight change. HENT: Negative for congestion, hearing loss and rhinorrhea. Eyes: Negative for visual disturbance. Respiratory: Negative for cough, chest tightness, shortness of breath and wheezing. Cardiovascular: Positive for leg swelling. Negative for chest pain and palpitations. Gastrointestinal: Negative for abdominal pain and blood in stool. Endocrine: Negative for cold intolerance, heat intolerance, polydipsia and polyuria. Genitourinary: Negative for difficulty urinating, dysuria, frequency and urgency. Musculoskeletal: Positive for arthralgias. Negative for myalgias. Skin: Negative for rash. Neurological: Negative for dizziness, weakness, light-headedness and headaches. Hematological: Does not bruise/bleed easily. Psychiatric/Behavioral: Negative for dysphoric mood and sleep disturbance. Objective:      Vitals:    09/13/23 1520   BP: 124/68   Pulse: 95   SpO2: 96%          Physical Exam  Constitutional:       Appearance: She is well-developed. HENT:      Head: Normocephalic and atraumatic. Pulmonary:      Effort: Pulmonary effort is normal.   Musculoskeletal:      Comments: Left lower extremity is visibly swollen from mid thigh to ankle. There is pitting edema. There is questionable knee effusion with decreased range of motion related to the swelling. Neurological:      Mental Status: She is alert and oriented to person, place, and time. Psychiatric:         Behavior: Behavior normal. Behavior is cooperative. Thought Content:  Thought content normal.         Judgment: Judgment normal.

## 2023-09-13 NOTE — PATIENT INSTRUCTIONS
Check stat ultrasound to rule out deep vein thrombosis, if negative work-up knee with x-ray and consideration for orthopedic evaluation.

## 2023-09-14 DIAGNOSIS — M81.0 AGE-RELATED OSTEOPOROSIS WITHOUT CURRENT PATHOLOGICAL FRACTURE: ICD-10-CM

## 2023-09-14 DIAGNOSIS — M25.562 PAIN IN LATERAL PORTION OF LEFT KNEE: Primary | ICD-10-CM

## 2023-09-14 PROCEDURE — 93971 EXTREMITY STUDY: CPT | Performed by: SURGERY

## 2023-09-14 RX ORDER — RISEDRONATE SODIUM 150 MG/1
TABLET, FILM COATED ORAL
Qty: 3 TABLET | Refills: 0 | Status: SHIPPED | OUTPATIENT
Start: 2023-09-14

## 2023-10-02 ENCOUNTER — EVALUATION (OUTPATIENT)
Dept: PHYSICAL THERAPY | Facility: CLINIC | Age: 73
End: 2023-10-02
Payer: COMMERCIAL

## 2023-10-02 DIAGNOSIS — M25.562 PAIN IN LATERAL PORTION OF LEFT KNEE: Primary | ICD-10-CM

## 2023-10-02 PROCEDURE — 97110 THERAPEUTIC EXERCISES: CPT | Performed by: PHYSICAL THERAPIST

## 2023-10-02 PROCEDURE — 97161 PT EVAL LOW COMPLEX 20 MIN: CPT | Performed by: PHYSICAL THERAPIST

## 2023-10-02 NOTE — PROGRESS NOTES
PT Evaluation     Today's date: 10/2/2023  Patient name: Terrence More  : [birth year]  MRN: [id number]  Referring provider: Kristofer Barrios MD  Dx:   Encounter Diagnosis     ICD-10-CM    1. Pain in lateral portion of left knee  M25.562 Ambulatory Referral to Physical Therapy                     Assessment  Assessment details: Patient was provided a home exercise program and demonstrated an understanding of exercises. Patient was advised to stop performing home exercise program if symptoms increase or new complaints developed. Verbal understanding demonstrated regarding home exercise program instructions. Patient would benefit from skilled physical therapy services for prescribed exercises, manual interventions, neuromuscular re-education, education, and modalities as deemed appropriate to assist patient in achieving their maximum level of function. Patient present today for assessment with dx Left lateral knee pain. She notes that since she saw her MD - this pain has subsided. She presents today with general weakness. 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      It is felt that patient would benefit from PT 1x/week ( to start ) to address underlying weakness and deconditioning. Patient presents asymptomatic left knee, abolished swelling left le/ calf with full AROM. Evaluation reveals:  Core/ proximal weakness in LE > UE's and an overall lack of endurance to activity. She presents motivated to start feeling better as she begins to wean off of her steroids over the next 6+ months or so.    Impairments: activity intolerance, impaired physical strength, lacks appropriate home exercise program, pain with function and poor posture   Understanding of Dx/Px/POC: good  Goals  STG   1. Patient will demonstrate independence and competence with HEP 2 -4 weeks  2. Patient will report postural awareness and self correction  2-4 weeks. LTG   1. Patient will report improvements with both functional and recreational abilities  4-6 weeks  2. Patient will demonstrate improved motor function  4-6 weeks. 3.  Endurance / distance to regular walks will improve  4-8 weeks. Plan  Plan details: Patient response to treatment will be monitored each session and progressed accordingly    Thank you for this referral.   Patient would benefit from: skilled physical therapy  Planned therapy interventions: IADL retraining, patient education, postural training, strengthening, stretching, therapeutic exercise, flexibility, home exercise program, neuromuscular re-education, balance and gait training  Frequency: 1x week  Plan of Care expiration date: 12/2/2023  Treatment plan discussed with: patient        Subjective Evaluation    History of Present Illness  Mechanism of injury: Left MAGDALENA 2021  - had PT  " I was doing great "  1 year post-op, she was getting back to normal  May 2022 - she got covid, asymptomatic for the most part   2 weeks later, she started with "weird pains" which migrated thru-out her body  After 1 week - she was diagnosed with giant cell auritis     Put on "giant dose of prednizone" which she is slowly weaning off. Now, she notes that the arthritis in her knees are flared up. 3 weeks ago- she woke and her left knee was so swollen, she couldn't bend it or move it    The swelling migrated to below knee   To MD  Doppler (-)  X-rays = arthritis     She reports that her calf became so very tender / hard to touch   She began wearing compression stockings which helped this quite a bit    Overall - this pain has set her back and she has overall weakness now, isn't exercising as much and is frustrated to her. Today - " I have trouble managing steps"   " I cannot walk longer distances"  Due to whole body feeling weak/ tired and she needs to sit. She was referred with lateral left knee pain which has since subsided - her swelling in much less. In general she is here due to overall weakness. Patient Goals  Patient goal: "improve my endurance"  " do more"  " walk further "   Pain  Progression: improved    Social Support  Lives with: spouse          Objective     Observations     Right Knee   Positive for deformity. Additional Observation Details  Right knee with bony protrusion lateral tibia   Right LE in general is slightly larger than left     Palpation     Additional Palpation Details  Unremarkable    Left calf - soft, non-tender, (-) homans    Tenderness     Additional Tenderness Details  unremarkable    Neurological Testing     Sensation     Knee   Left Knee   Intact: light touch    Right Knee   Intact: light touch     Active Range of Motion   Left Knee   Flexion: WFL  Extension: -2 degrees     Right Knee   Flexion: WFL  Extension: -3 degrees     Passive Range of Motion   Left Knee   Extension: WFL    Right Knee   Extension: [place]    Strength/Myotome Testing     Left Knee   Flexion: 4+  Extension: 4+  Quadriceps contraction: fair    Right Knee   Flexion: 4+  Extension: 4+  Quadriceps contraction: fair    Additional Strength Details  Hip flexion R = 4  L = 4-/5  Hip abd R = 4  L = 4/5   Hip IR/ ER = 4/5 bilaterally     Tests     Left Knee   Negative Apley's compression, Apley's distraction, medial Andrea, posterior drawer, valgus stress test at 0 degrees, valgus stress test at 30 degrees, varus stress test at 0 degrees and varus stress test at 30 degrees.      General Comments:      Knee Comments  Gait - slowed overall, decreased arm swing bilaterally     Posture - sits in a slumping type posture with min fhp, thoracic rounding , slight trunk fwd   Able to self correct with cueing   May lack endurance to maintain. Precautions: pain left lateral knee / generalized weakness  Dx with Giant Cell Arteritis 2022  (still managing these symptoms with steroids )     [email or web address]  Access Code: [email or web address]        POC expires Unit limit Auth Expiration date PT/OT + Visit Limit?   12/2/23 3 pending bomn                           Visit/Unit Tracking  AUTH Status:  Date 10/2              pending Used 1               Remaining                          Manuals 10/2                                                                Neuro Re-Ed                          HL TA marching             HL TA w/ roll             HL TB hip abd             TB clamshells                                       Ther Ex             bike             ube             aktc 5s x 10            ltr 5s x 10            Fig 4 hip ir/er stretch             Bridging w/ tb                           S/l abd                                                                                                        Ther Activity                                       Gait Training                                       Modalities

## 2023-10-03 ENCOUNTER — APPOINTMENT (OUTPATIENT)
Dept: LAB | Facility: CLINIC | Age: 73
End: 2023-10-03
Payer: COMMERCIAL

## 2023-10-11 ENCOUNTER — OFFICE VISIT (OUTPATIENT)
Dept: PHYSICAL THERAPY | Facility: CLINIC | Age: 73
End: 2023-10-11
Payer: COMMERCIAL

## 2023-10-11 DIAGNOSIS — M25.562 PAIN IN LATERAL PORTION OF LEFT KNEE: Primary | ICD-10-CM

## 2023-10-11 PROCEDURE — 97112 NEUROMUSCULAR REEDUCATION: CPT

## 2023-10-11 PROCEDURE — 97110 THERAPEUTIC EXERCISES: CPT

## 2023-10-11 NOTE — PROGRESS NOTES
Daily Note     Today's date: 10/11/2023  Patient name: Rickard Kawasaki  :   MRN: [id number]  Referring provider: Ferny Lozoya MD  Dx:   Encounter Diagnosis     ICD-10-CM    1. Pain in lateral portion of left knee  M25.562                      Subjective: Patient denied pain; patient's chief complaint is fatigue/general malaise/ "no energy and weak legs". Patient stated she has appointment with rheumatologist in December and primary at [facility]. Objective: See treatment diary below      Assessment: Tolerated treatment  and progression of current program with marked mild fatigue. Discussed benefits of proper hydration for muscular cramping and overall health . Patient exhibited good technique with therapeutic exercises and would benefit from continued PT      Plan: Continue per plan of care. Progress treatment as tolerated. Precautions: pain left lateral knee / generalized weakness  Dx with Giant Cell Arteritis   (still managing these symptoms with steroids )     [email or web address]  Access Code: [address] [facility]        POC expires Unit limit Auth Expiration date PT/OT + Visit Limit?   23 3 pending bomn                           Visit/Unit Tracking  AUTH Status:  Date 10/2 10/11             pending Used 1 2              Remaining                          Manuals 10/2 10/11                                                               Neuro Re-Ed                          HL TA marching  20x            HL TA w/ roll  :05  20x           HL TB hip abd  GTB  20x            B TB clamshells  GTB  20x                                     Ther Ex             bike  6'           ube  5'           aktc 5s x 10 :05  10x           ltr 5s x 10 :05  10x           Fig 4 hip ir/er stretch  :20  3x each           Bridging w/ tb   GTB  2x  Hamstring cramp                        S/l abd                                                                                                        Ther Activity Gait Training                                       Modalities

## 2023-10-18 ENCOUNTER — OFFICE VISIT (OUTPATIENT)
Dept: PHYSICAL THERAPY | Facility: CLINIC | Age: 73
End: 2023-10-18
Payer: COMMERCIAL

## 2023-10-18 DIAGNOSIS — M25.562 PAIN IN LATERAL PORTION OF LEFT KNEE: Primary | ICD-10-CM

## 2023-10-18 PROCEDURE — 97112 NEUROMUSCULAR REEDUCATION: CPT

## 2023-10-18 PROCEDURE — 97110 THERAPEUTIC EXERCISES: CPT

## 2023-10-18 NOTE — PROGRESS NOTES
Daily Note     Today's date: 10/18/2023  Patient name: Danny Mitchell  :   MRN: [id number]  Referring provider: Sam Medrano MD  Dx:   Encounter Diagnosis     ICD-10-CM    1. Pain in lateral portion of left knee  M25.562                      Subjective: Patient reports that her knee continues to do well with no complaints of pain. Notes that her fatigue in general has been her main complaint. Patient stated she has appointment with rheumatologist in December and primary at [facility]. Pt did c/o some jaw pain during today's session. Objective: See treatment diary below  /87 BP was taken at the end of session. Assessment: Tolerated treatment well today. Tightness was present in b/l LE's feeling looser at the end of session. She was able to performed full reps of hip abd with the bridge today. Patient exhibited good technique with therapeutic exercises and would benefit from continued PT      Plan: Continue per plan of care. Progress treatment as tolerated. Precautions: pain left lateral knee / generalized weakness  Dx with Giant Cell Arteritis   (still managing these symptoms with steroids )     [email or web address]  Access Code: [address] [facility]        POC expires Unit limit Auth Expiration date PT/OT + Visit Limit?   23 3 pending bomn                           Visit/Unit Tracking  AUTH Status:  Date 10/2 10/11 10/18            pending Used 1 2 3             Remaining                          Manuals 10/2 10/11 10/18                                                              Neuro Re-Ed                          HL TA marching  20x  20x          HL TA w/ roll  :05  20x :05 20x          HL TB hip abd  GTB  20x  Gtb 20x          B TB clamshells  GTB  20x Gtb 20x                                    Ther Ex             bike  6' 6'          ube  5' 5'          aktc 5s x 10 :05  10x :05 10x          ltr 5s x 10 :05  10x :05 10x          Fig 4 hip ir/er stretch  :20  3x each :20 3x ea          Bridging w/ tb   GTB  2x  Hamstring cramp GTB 20x                       S/l abd                                                                                                        Ther Activity                                       Gait Training                                       Modalities

## 2023-10-23 ENCOUNTER — RA CDI HCC (OUTPATIENT)
Dept: OTHER | Facility: HOSPITAL | Age: 73
End: 2023-10-23

## 2023-10-23 NOTE — PROGRESS NOTES
[address] coding opportunities       Chart reviewed, no opportunity found: [address] Review     Patients Insurance     Medicare Insurance: [employer]

## 2023-10-25 ENCOUNTER — APPOINTMENT (OUTPATIENT)
Dept: LAB | Facility: CLINIC | Age: 73
End: 2023-10-25
Payer: COMMERCIAL

## 2023-10-25 ENCOUNTER — OFFICE VISIT (OUTPATIENT)
Dept: PHYSICAL THERAPY | Facility: CLINIC | Age: 73
End: 2023-10-25
Payer: COMMERCIAL

## 2023-10-25 DIAGNOSIS — M25.562 PAIN IN LATERAL PORTION OF LEFT KNEE: Primary | ICD-10-CM

## 2023-10-25 LAB
ALBUMIN SERPL BCP-MCNC: 3.9 G/DL (ref 3.5–5)
ALP SERPL-CCNC: 36 U/L (ref 34–104)
ALT SERPL W P-5'-P-CCNC: 18 U/L (ref 7–52)
ANION GAP SERPL CALCULATED.3IONS-SCNC: 8 MMOL/L
AST SERPL W P-5'-P-CCNC: 27 U/L (ref 13–39)
BASOPHILS # BLD AUTO: 0.04 THOUSANDS/ÂΜL (ref 0–0.1)
BASOPHILS NFR BLD AUTO: 1 % (ref 0–1)
BILIRUB SERPL-MCNC: 0.41 MG/DL (ref 0.2–1)
BUN SERPL-MCNC: 16 MG/DL (ref 5–25)
CALCIUM SERPL-MCNC: 9.2 MG/DL (ref 8.4–10.2)
CHLORIDE SERPL-SCNC: 102 MMOL/L (ref 96–108)
CHOLEST SERPL-MCNC: 193 MG/DL
CO2 SERPL-SCNC: 33 MMOL/L (ref 21–32)
CREAT SERPL-MCNC: 0.7 MG/DL (ref 0.6–1.3)
EOSINOPHIL # BLD AUTO: 0.12 THOUSAND/ÂΜL (ref 0–0.61)
EOSINOPHIL NFR BLD AUTO: 2 % (ref 0–6)
ERYTHROCYTE [DISTWIDTH] IN BLOOD BY AUTOMATED COUNT: 14.5 % (ref 11.6–15.1)
EST. AVERAGE GLUCOSE BLD GHB EST-MCNC: 123 MG/DL
GFR SERPL CREATININE-BSD FRML MDRD: 85 ML/MIN/1.73SQ M
GLUCOSE P FAST SERPL-MCNC: 81 MG/DL (ref 65–99)
HBA1C MFR BLD: 5.9 %
HCT VFR BLD AUTO: 42.8 % (ref 34.8–46.1)
HDLC SERPL-MCNC: 72 MG/DL
HGB BLD-MCNC: 13.3 G/DL (ref 11.5–15.4)
IMM GRANULOCYTES # BLD AUTO: 0.02 THOUSAND/UL (ref 0–0.2)
IMM GRANULOCYTES NFR BLD AUTO: 0 % (ref 0–2)
LDLC SERPL CALC-MCNC: 105 MG/DL (ref 0–100)
LYMPHOCYTES # BLD AUTO: 1.75 THOUSANDS/ÂΜL (ref 0.6–4.47)
LYMPHOCYTES NFR BLD AUTO: 27 % (ref 14–44)
MCH RBC QN AUTO: 28.9 PG (ref 26.8–34.3)
MCHC RBC AUTO-ENTMCNC: 31.1 G/DL (ref 31.4–37.4)
MCV RBC AUTO: 93 FL (ref 82–98)
MONOCYTES # BLD AUTO: 0.58 THOUSAND/ÂΜL (ref 0.17–1.22)
MONOCYTES NFR BLD AUTO: 9 % (ref 4–12)
NEUTROPHILS # BLD AUTO: 4.06 THOUSANDS/ÂΜL (ref 1.85–7.62)
NEUTS SEG NFR BLD AUTO: 61 % (ref 43–75)
NRBC BLD AUTO-RTO: 0 /100 WBCS
PLATELET # BLD AUTO: 302 THOUSANDS/UL (ref 149–390)
PMV BLD AUTO: 10.4 FL (ref 8.9–12.7)
POTASSIUM SERPL-SCNC: 4.2 MMOL/L (ref 3.5–5.3)
PROT SERPL-MCNC: 6.6 G/DL (ref 6.4–8.4)
RBC # BLD AUTO: 4.6 MILLION/UL (ref 3.81–5.12)
SODIUM SERPL-SCNC: 143 MMOL/L (ref 135–147)
TRIGL SERPL-MCNC: 78 MG/DL
TSH SERPL DL<=0.05 MIU/L-ACNC: 2.5 UIU/ML (ref 0.45–4.5)
WBC # BLD AUTO: 6.57 THOUSAND/UL (ref 4.31–10.16)

## 2023-10-25 PROCEDURE — 83036 HEMOGLOBIN GLYCOSYLATED A1C: CPT

## 2023-10-25 PROCEDURE — 84443 ASSAY THYROID STIM HORMONE: CPT

## 2023-10-25 PROCEDURE — 80061 LIPID PANEL: CPT

## 2023-10-25 PROCEDURE — 85025 COMPLETE CBC W/AUTO DIFF WBC: CPT

## 2023-10-25 PROCEDURE — 97110 THERAPEUTIC EXERCISES: CPT | Performed by: PHYSICAL THERAPIST

## 2023-10-25 PROCEDURE — 80053 COMPREHEN METABOLIC PANEL: CPT

## 2023-10-25 NOTE — PROGRESS NOTES
Daily Note     Today's date: 10/25/2023  Patient name: Mardene Dakin  : [birth year]  MRN: [id number]  Referring provider: Td Nichols MD  Dx:   Encounter Diagnosis     ICD-10-CM    1. Pain in lateral portion of left knee  M25.562                      Subjective: Patient reports that her knee is doing better overall -  she still feels a sensation of weakness - especially with descending steps. Reports no further episodes of jaw pain   C/o soreness with addition of step ups today     Objective: See treatment diary below      Assessment: Tolerated treatment well today. Some discomfort produced with step ups -   able to complete requested tasks. Plan: Continue per plan of care. Progress treatment as tolerated. Precautions: pain left lateral knee / generalized weakness  Dx with Giant Cell Arteritis   (still managing these symptoms with steroids )     [email or web address]  Access Code: [address] [facility]        POC expires Unit limit Auth Expiration date PT/OT + Visit Limit?   23 3 pending bomn                           Visit/Unit Tracking  AUTH Status:  Date 10/2 10/11 10/18 10/ 25           pending Used 1 2 3- foto 4            Remaining                          Manuals 10/2 10/11 10/18 10/25                                                             Neuro Re-Ed                          HL TA marching  20x  20x 20x          HL TA w/ roll  :05  20x :05 20x 5s x 20          HL TB hip abd  GTB  20x  Gtb 20x Gtb x 20         B TB clamshells  GTB  20x Gtb 20x Gtb x 20                                    Ther Ex             bike  6' 6' 7'         ube  5' 5' 5'         aktc 5s x 10 :05  10x :05 10x 5s x 10          ltr 5s x 10 :05  10x :05 10x 5s x 10          Fig 4 hip ir/er stretch  :20  3x each :20 3x ea 20x  3 ea          Bridging w/ tb   GTB  2x  Hamstring cramp GTB 20x 20x                       S/l abd                          TB LPD, rows, ext     Tb x 20  ea                      Stand hip abd, hs curls, HR     20x ea         Step ups  f/ l     6" x 20 ea bilat                                                                                                    Ther Activity                                       Gait Training                                       Modalities

## 2023-10-26 DIAGNOSIS — M31.6 TEMPORAL ARTERITIS (HCC): Primary | ICD-10-CM

## 2023-10-26 RX ORDER — PREDNISONE 5 MG/1
5 TABLET ORAL DAILY
Qty: 90 TABLET | Refills: 1 | Status: SHIPPED | OUTPATIENT
Start: 2023-10-26

## 2023-10-26 RX ORDER — PREDNISONE 1 MG/1
4 TABLET ORAL DAILY
Qty: 120 TABLET | Refills: 3 | Status: SHIPPED | OUTPATIENT
Start: 2023-10-26

## 2023-10-31 ENCOUNTER — RA CDI HCC (OUTPATIENT)
Dept: OTHER | Facility: HOSPITAL | Age: 73
End: 2023-10-31

## 2023-10-31 ENCOUNTER — OFFICE VISIT (OUTPATIENT)
Age: 73
End: 2023-10-31
Payer: COMMERCIAL

## 2023-10-31 VITALS
OXYGEN SATURATION: 98 % | BODY MASS INDEX: 25.21 KG/M2 | HEIGHT: 62 IN | DIASTOLIC BLOOD PRESSURE: 90 MMHG | WEIGHT: 137 LBS | HEART RATE: 78 BPM | SYSTOLIC BLOOD PRESSURE: 134 MMHG

## 2023-10-31 DIAGNOSIS — I10 PRIMARY HYPERTENSION: ICD-10-CM

## 2023-10-31 DIAGNOSIS — E78.2 MIXED HYPERLIPIDEMIA: ICD-10-CM

## 2023-10-31 DIAGNOSIS — R73.01 IMPAIRED FASTING GLUCOSE: Primary | ICD-10-CM

## 2023-10-31 DIAGNOSIS — M31.6 TEMPORAL ARTERITIS (HCC): ICD-10-CM

## 2023-10-31 PROCEDURE — 99214 OFFICE O/P EST MOD 30 MIN: CPT | Performed by: INTERNAL MEDICINE

## 2023-10-31 PROCEDURE — 1159F MED LIST DOCD IN RCRD: CPT | Performed by: INTERNAL MEDICINE

## 2023-10-31 PROCEDURE — 1160F RVW MEDS BY RX/DR IN RCRD: CPT | Performed by: INTERNAL MEDICINE

## 2023-10-31 PROCEDURE — 3725F SCREEN DEPRESSION PERFORMED: CPT | Performed by: INTERNAL MEDICINE

## 2023-10-31 NOTE — PATIENT INSTRUCTIONS
Lab data reviewed in detail and compared to prior        Glucose related to steroids stable with A1c 5.9    Hypertension-stable off medication. Monitor home blood pressures closely. Continue to increase aerobic exercise as able. Contact me if blood pressures running greater than 140/90. Hyperlipidemia stable on rosuvastatin    Osteoporosis-continue on risedronate    Temporal arteritis-doing well with prednisone taper. Continue with 3-week follow-up sed rates    Health maintenance-we discussed vaccines, flu, COVID and RSV to be administered within the next month. Routine follow-up after labs in 6 months, sooner as needed.

## 2023-10-31 NOTE — PROGRESS NOTES
[address] [facility] coding opportunities       Chart reviewed, no opportunity found: CHART REVIEWED, [address]     Patients Insurance     Medicare Insurance: [employer]

## 2023-10-31 NOTE — PROGRESS NOTES
Assessment/Plan:    Diagnoses and all orders for this visit:    Impaired fasting glucose  -     Hemoglobin A1C; Future    Temporal arteritis (HCC)    Primary hypertension    Mixed hyperlipidemia  -     CBC and differential; Future  -     Comprehensive metabolic panel; Future  -     Lipid panel; Future           Depression Screening and Follow-up Plan: Patient was screened for depression during today's encounter. They screened negative with a PHQ-2 score of 0. Patient Instructions   Lab data reviewed in detail and compared to prior        Glucose related to steroids stable with A1c 5.9    Hypertension-stable off medication. Monitor home blood pressures closely. Continue to increase aerobic exercise as able. Contact me if blood pressures running greater than 140/90. Hyperlipidemia stable on rosuvastatin    Osteoporosis-continue on risedronate    Temporal arteritis-doing well with prednisone taper. Continue with 3-week follow-up sed rates    Health maintenance-we discussed vaccines, flu, COVID and RSV to be administered within the next month. Routine follow-up after labs in 6 months, sooner as needed. Subjective:      Patient ID: Rickard Kawasaki is a 75 y.o. female    F/u mmp and review labs   Feeling generally well   Knee pain and stiffness have improved. Swelling better d/t compression stockings. Now using prn. Still doing PT. Temporal arteritis-she has weaned down to 9/10 milligrams of prednisone daily since last week. She had a few days of shoulder pain last month, then jaw pain for a few days, but both have resolved. Osteoporosis-tolerating risedronate monthly   Hip stable s/p MAGDALENA 6/21  HTN-stable off rx, though diastolics creeping into mid 80's. HPL-tolerating crestor   Exercising has been mostly PT exercises daily. Now back to walking outside 1 mi 2 d per week.              Current Outpatient Medications:     Calcium 500 MG tablet, Take by mouth, Disp: , Rfl:     predniSONE 1 mg tablet, Take 4 tablets (4 mg total) by mouth daily Taper as directed (Patient taking differently: Take 4 mg by mouth daily Alternating between 9mg and 10mg), Disp: 120 tablet, Rfl: 3    predniSONE 5 mg tablet, Take 1 tablet (5 mg total) by mouth daily, Disp: 90 tablet, Rfl: 1    Progesterone Micronized 10 % CREA, Place on the skin daily , Disp: , Rfl:     risedronate (ACTONEL) 150 MG tablet, TAKE 1 TABLET BY MOUTH EVERY 30 DAYS WITH WATER ON EMPTY STOMACH, NOTHING BY MOUTH OR LIE DOWN FOR NEXT 30 MINUTES., Disp: 3 tablet, Rfl: 0    rosuvastatin (CRESTOR) 10 MG tablet, TAKE 1 TABLET BY MOUTH EVERY DAY, Disp: 90 tablet, Rfl: 1    Recent Results (from the past 1008 hour(s))   Sedimentation rate, automated    Collection Time: 10/03/23 11:13 AM   Result Value Ref Range    Sed Rate 29 0 - 29 mm/hour   CBC and differential    Collection Time: 10/25/23  7:37 AM   Result Value Ref Range    WBC 6.57 4.31 - 10.16 Thousand/uL    RBC 4.60 3.81 - 5.12 Million/uL    Hemoglobin 13.3 11.5 - 15.4 g/dL    Hematocrit 42.8 34.8 - 46.1 %    MCV 93 82 - 98 fL    MCH 28.9 26.8 - 34.3 pg    MCHC 31.1 (L) 31.4 - 37.4 g/dL    RDW 14.5 11.6 - 15.1 %    MPV 10.4 8.9 - 12.7 fL    Platelets 413 121 - 419 Thousands/uL    nRBC 0 /100 WBCs    Neutrophils Relative 61 43 - 75 %    Immat GRANS % 0 0 - 2 %    Lymphocytes Relative 27 14 - 44 %    Monocytes Relative 9 4 - 12 %    Eosinophils Relative 2 0 - 6 %    Basophils Relative 1 0 - 1 %    Neutrophils Absolute 4.06 1.85 - 7.62 Thousands/µL    Immature Grans Absolute 0.02 0.00 - 0.20 Thousand/uL    Lymphocytes Absolute 1.75 0.60 - 4.47 Thousands/µL    Monocytes Absolute 0.58 0.17 - 1.22 Thousand/µL    Eosinophils Absolute 0.12 0.00 - 0.61 Thousand/µL    Basophils Absolute 0.04 0.00 - 0.10 Thousands/µL   Comprehensive metabolic panel    Collection Time: 10/25/23  7:37 AM   Result Value Ref Range    Sodium 143 135 - 147 mmol/L    Potassium 4.2 3.5 - 5.3 mmol/L    Chloride 102 96 - 108 mmol/L    CO2 33 (H) 21 - 32 mmol/L    ANION GAP 8 mmol/L    BUN 16 5 - 25 mg/dL    Creatinine 0.70 0.60 - 1.30 mg/dL    Glucose, Fasting 81 65 - 99 mg/dL    Calcium 9.2 8.4 - 10.2 mg/dL    AST 27 13 - 39 U/L    ALT 18 7 - 52 U/L    Alkaline Phosphatase 36 34 - 104 U/L    Total Protein 6.6 6.4 - 8.4 g/dL    Albumin 3.9 3.5 - 5.0 g/dL    Total Bilirubin 0.41 0.20 - 1.00 mg/dL    eGFR 85 ml/min/1.73sq m   Lipid Panel with Direct LDL reflex    Collection Time: 10/25/23  7:37 AM   Result Value Ref Range    Cholesterol 193 See Comment mg/dL    Triglycerides 78 See Comment mg/dL    HDL, Direct 72 >=50 mg/dL    LDL Calculated 105 (H) 0 - 100 mg/dL   HEMOGLOBIN A1C W/ EAG ESTIMATION    Collection Time: 10/25/23  7:37 AM   Result Value Ref Range    Hemoglobin A1C 5.9 (H) Normal 4.0-5.6%; PreDiabetic 5.7-6.4%; Diabetic >=6.5%; Glycemic control for adults with diabetes <7.0% %     mg/dl   TSH, 3rd generation with Free T4 reflex    Collection Time: 10/25/23  7:37 AM   Result Value Ref Range    TSH 3RD GENERATON 2.501 0.450 - 4.500 uIU/mL   Sedimentation rate, automated    Collection Time: 10/25/23  7:37 AM   Result Value Ref Range    Sed Rate 28 0 - 29 mm/hour       The following portions of the patient's history were reviewed and updated as appropriate: allergies, current medications, past family history, past medical history, past social history, past surgical history and problem list.     Review of Systems   Constitutional:  Negative for appetite change, chills, diaphoresis, fatigue, fever and unexpected weight change. HENT:  Negative for congestion, ear pain, hearing loss, rhinorrhea and sore throat. Eyes:  Negative for pain and visual disturbance. Respiratory:  Negative for cough, chest tightness, shortness of breath and wheezing. Cardiovascular:  Negative for chest pain, palpitations and leg swelling. Gastrointestinal:  Negative for abdominal pain, blood in stool and vomiting.    Endocrine: Negative for cold intolerance, heat intolerance, polydipsia and polyuria. Genitourinary:  Negative for difficulty urinating, dysuria, frequency, hematuria and urgency. Musculoskeletal:  Negative for arthralgias, back pain and myalgias. Skin:  Negative for color change and rash. Neurological:  Negative for dizziness, seizures, syncope, weakness, light-headedness and headaches. Hematological:  Does not bruise/bleed easily. Psychiatric/Behavioral:  Negative for dysphoric mood and sleep disturbance. All other systems reviewed and are negative. Objective:      Vitals:    10/31/23 1044   BP: 134/90   Pulse: 78   SpO2: 98%          Physical Exam  Constitutional:       Appearance: She is well-developed. HENT:      Head: Normocephalic and atraumatic. Nose: Nose normal.   Eyes:      General: No scleral icterus. Conjunctiva/sclera: Conjunctivae normal.      Pupils: Pupils are equal, round, and reactive to light. Neck:      Thyroid: No thyromegaly. Vascular: No JVD. Trachea: No tracheal deviation. Cardiovascular:      Rate and Rhythm: Normal rate and regular rhythm. Heart sounds: No murmur heard. No friction rub. No gallop. Pulmonary:      Effort: Pulmonary effort is normal. No respiratory distress. Breath sounds: Normal breath sounds. No wheezing or rales. Musculoskeletal:         General: No deformity. Cervical back: Normal range of motion and neck supple. Lymphadenopathy:      Cervical: No cervical adenopathy. Skin:     General: Skin is warm and dry. Coloration: Skin is not pale. Findings: No erythema or rash. Neurological:      Mental Status: She is alert and oriented to person, place, and time. Cranial Nerves: No cranial nerve deficit. Psychiatric:         Behavior: Behavior normal.         Thought Content:  Thought content normal.         Judgment: Judgment normal.

## 2023-11-01 ENCOUNTER — OFFICE VISIT (OUTPATIENT)
Dept: PHYSICAL THERAPY | Facility: CLINIC | Age: 73
End: 2023-11-01
Payer: COMMERCIAL

## 2023-11-01 DIAGNOSIS — M25.562 PAIN IN LATERAL PORTION OF LEFT KNEE: Primary | ICD-10-CM

## 2023-11-01 PROCEDURE — 97110 THERAPEUTIC EXERCISES: CPT | Performed by: PHYSICAL THERAPIST

## 2023-11-01 NOTE — PROGRESS NOTES
Daily Note     Today's date: 2023  Patient name: Fany Salgado  : [birth year]  MRN: [id number]  Referring provider: Lambert Foreman MD  Dx:   Encounter Diagnosis     ICD-10-CM    1. Pain in lateral portion of left knee  M25.562                      Subjective: patient notes that she stopped taking tylenol last Friday " to see where she's at " and reports that she has increased discomfort remaining with climbing steps   otherwise, her knees feel good   (-) sleep disruption, (-) difficulty walking, tx sit - stand    Soreness with addition of step ups/ downs didn't linger too long after last session per patient     Objective: See treatment diary below      Assessment: Tolerated treatment well today. Plan: Continue per plan of care. Progress treatment as tolerated. Add weight next session      Precautions: pain left lateral knee / generalized weakness  Dx with Giant Cell Arteritis   (still managing these symptoms with steroids )     [email or web address]  Access Code: [id number] [facility]        POC expires Unit limit Auth Expiration date PT/OT + Visit Limit?   23 3 pending bomn                           Visit/Unit Tracking  AUTH Status:  Date 10/2 10/11 10/18 10/ 25 11/1          pending Used 1 2 3- foto 4 5           Remaining                          Manuals 10/2 10/11 10/18 10/25 11/1                                                            Neuro Re-Ed                          HL TA marching  20x  20x 20x  20x        HL TA w/ roll  :05  20x :05 20x 5s x 20  5s x 20         HL TB hip abd  GTB  20x  Gtb 20x Gtb x 20 Btb x 20         B TB clamshells  GTB  20x Gtb 20x Gtb x 20  Btb x 20                                   Ther Ex             bike  6' 6' 7' 7'        ube  5' 5' 5' 5'        aktc 5s x 10 :05  10x :05 10x 5s x 10  5s x 10        ltr 5s x 10 :05  10x :05 10x 5s x 10  5s x 10         Fig 4 hip ir/er stretch  :20  3x each :20 3x ea 20x  3 ea  20s x 3ea         Bridging w/ tb GTB  2x  Hamstring cramp GTB 20x 20x  Btb x 20                      S/l abd                          TB LPD, rows, ext     Tb x 20  ea Gtb x 20 ea                      Stand hip abd, hs curls, HR     20x ea 20x ea         Step ups  f/ l     6" x 20 ea bilat 6" x 20 ea bilat        Mini squats @ bar      20x ea                                                                                      Ther Activity                                       Gait Training                                       Modalities

## 2023-11-02 ENCOUNTER — TELEPHONE (OUTPATIENT)
Dept: PAIN MEDICINE | Facility: CLINIC | Age: 73
End: 2023-11-02

## 2023-11-02 NOTE — TELEPHONE ENCOUNTER
[facility] for patient to call back, appt Dec 28 with Dr. Tamiko Hastings needs to be rescheduled.   Provider out of the office

## 2023-11-08 ENCOUNTER — OFFICE VISIT (OUTPATIENT)
Dept: PHYSICAL THERAPY | Facility: CLINIC | Age: 73
End: 2023-11-08
Payer: COMMERCIAL

## 2023-11-08 DIAGNOSIS — M25.562 PAIN IN LATERAL PORTION OF LEFT KNEE: Primary | ICD-10-CM

## 2023-11-08 PROCEDURE — 97110 THERAPEUTIC EXERCISES: CPT | Performed by: PHYSICAL THERAPIST

## 2023-11-08 NOTE — PROGRESS NOTES
Daily Note     Today's date: 2023  Patient name: Milton Wright  :   MRN: [id number]  Referring provider: Naomy Ulloa MD  Dx:   Encounter Diagnosis     ICD-10-CM    1. Pain in lateral portion of left knee  M25.562                      Subjective:  Patient states that she is taking tylenol only for sleeping and feels that it is starting to make a good difference for her morning- waking up feeling much better     Objective: See treatment diary below      Assessment: Tolerated treatment well today. Plan: Continue per plan of care. Progress treatment as tolerated. Add weight next session    2 additional sessions planned than dc planned tentatively       Precautions: pain left lateral knee / generalized weakness  Dx with Giant Cell Arteritis   (still managing these symptoms with steroids )     [email or web address]  Access Code: [email or web address] [facility]        POC expires Unit limit Auth Expiration date PT/OT + Visit Limit?   23 3 pending bomn                           Visit/Unit Tracking  AUTH Status:  Date 10/2 10/11 10/18 10/ 25 11/1 11/8         pending Used 1 2 3- foto 4 5 6 - foto          Remaining                          Manuals 10/2 10/11 10/18 10/25 11/1 11/8                                                           Neuro Re-Ed                          HL TA marching  20x  20x 20x  20x 20x        HL TA w/ roll  :05  20x :05 20x 5s x 20  5s x 20  5s x 20       HL TB hip abd  GTB  20x  Gtb 20x Gtb x 20 Btb x 20  Btb x 20       B TB clamshells  GTB  20x Gtb 20x Gtb x 20  Btb x 20  Btb x 20                                  Ther Ex             bike  6' 6' 7' 7' 7'       ube  5' 5' 5' 5' 5'       aktc 5s x 10 :05  10x :05 10x 5s x 10  5s x 10 5s x 10        ltr 5s x 10 :05  10x :05 10x 5s x 10  5s x 10  5s x 10       Fig 4 hip ir/er stretch  :20  3x each :20 3x ea 20x  3 ea  20s x 3ea  20s x 3        Bridging w/ tb   GTB  2x  Hamstring cramp GTB 20x 20x  Btb x 20  Btb x 20 TB LPD, rows, ext     Tb x 20  ea Gtb x 20 ea  Gtb x 20 ea                    Stand hip abd, hs curls, HR     20x ea 20x ea  20x ea        Step ups  f/ l     6" x 20 ea bilat 6" x 20 ea bilat 6" x 20 ea        Mini squats @ bar      20x ea 20x ea                                                                                     Ther Activity                                       Gait Training                                       Modalities

## 2023-11-15 ENCOUNTER — OFFICE VISIT (OUTPATIENT)
Dept: PHYSICAL THERAPY | Facility: CLINIC | Age: 73
End: 2023-11-15
Payer: COMMERCIAL

## 2023-11-15 ENCOUNTER — APPOINTMENT (OUTPATIENT)
Dept: LAB | Facility: CLINIC | Age: 73
End: 2023-11-15
Payer: COMMERCIAL

## 2023-11-15 DIAGNOSIS — M25.562 PAIN IN LATERAL PORTION OF LEFT KNEE: Primary | ICD-10-CM

## 2023-11-15 PROCEDURE — 97110 THERAPEUTIC EXERCISES: CPT

## 2023-11-15 NOTE — PROGRESS NOTES
Daily Note     Today's date: 11/15/2023  Patient name: Niesha Nash  :   MRN: [id number]  Referring provider: Serjio Tapia MD  Dx:   Encounter Diagnosis     ICD-10-CM    1. Pain in lateral portion of left knee  M25.562           Start Time: 903          Subjective:  Patient states that she is having a bad week noting pain in her L ankle     Objective: See treatment diary below      Assessment: Tolerated treatment well today. Plan: Continue per plan of care. Progress treatment as tolerated. Continued with the same program this session avoiding any progressions since pt was not feeling the best. Add weight next session    1 additional sessions planned than dc planned tentatively       Precautions: pain left lateral knee / generalized weakness  Dx with Giant Cell Arteritis   (still managing these symptoms with steroids )     [email or web address]  Access Code: [email or web address] [facility]        POC expires Unit limit Auth Expiration date PT/OT + Visit Limit?   23 3 pending bomn                           Visit/Unit Tracking  AUTH Status:  Date 10/2 10/11 10/18 10/ 25 11/1 11/8 11/15        pending Used 1 2 3- foto 4 5 6 - foto 7         Remaining                          Manuals 10/2 10/11 10/18 10/25 11/1 11/8 11/15                                                          Neuro Re-Ed                          HL TA marching  20x  20x 20x  20x 20x  20x      HL TA w/ roll  :05  20x :05 20x 5s x 20  5s x 20  5s x 20 5s x20      HL TB hip abd  GTB  20x  Gtb 20x Gtb x 20 Btb x 20  Btb x 20 Btb x20      B TB clamshells  GTB  20x Gtb 20x Gtb x 20  Btb x 20  Btb x 20  Btb x20                                Ther Ex             bike  6' 6' 7' 7' 7' 7'      ube  5' 5' 5' 5' 5' 5'      aktc 5s x 10 :05  10x :05 10x 5s x 10  5s x 10 5s x 10  5s x 10      ltr 5s x 10 :05  10x :05 10x 5s x 10  5s x 10  5s x 10 5s x10      Fig 4 hip ir/er stretch  :20  3x each :20 3x ea 20x  3 ea  20s x 3ea  20s x 3  20s x3 Bridging w/ tb   GTB  2x  Hamstring cramp GTB 20x 20x  Btb x 20  Btb x 20  Btb x20                                             TB LPD, rows, ext     Tb x 20  ea Gtb x 20 ea  Gtb x 20 ea Gtb x20 ea                   Stand hip abd, hs curls, HR     20x ea 20x ea  20x ea  20x ea      Step ups  f/ l     6" x 20 ea bilat 6" x 20 ea bilat 6" x 20 ea  6" x 20 ea      Mini squats @ bar      20x ea 20x ea 20x ea                                                                                    Ther Activity                                       Gait Training                                       Modalities

## 2023-11-16 ENCOUNTER — APPOINTMENT (OUTPATIENT)
Dept: PHYSICAL THERAPY | Facility: CLINIC | Age: 73
End: 2023-11-16
Payer: COMMERCIAL

## 2023-11-22 ENCOUNTER — OFFICE VISIT (OUTPATIENT)
Dept: PHYSICAL THERAPY | Facility: CLINIC | Age: 73
End: 2023-11-22
Payer: COMMERCIAL

## 2023-11-22 DIAGNOSIS — M25.562 PAIN IN LATERAL PORTION OF LEFT KNEE: Primary | ICD-10-CM

## 2023-11-22 PROCEDURE — 97110 THERAPEUTIC EXERCISES: CPT | Performed by: PHYSICAL THERAPIST

## 2023-12-08 ENCOUNTER — APPOINTMENT (OUTPATIENT)
Dept: LAB | Facility: CLINIC | Age: 73
End: 2023-12-08
Payer: COMMERCIAL

## 2023-12-18 ENCOUNTER — TELEPHONE (OUTPATIENT)
Age: 73
End: 2023-12-18

## 2023-12-18 NOTE — TELEPHONE ENCOUNTER
----- Message from Felix Ashley MD sent at 12/18/2023 10:23 AM EST -----  Regarding: FW: Antibiotics  Contact: [phone]  Please offer a visit.  Let her know we don't get msg's over w/e.        ----- Message -----  From: Irena John  Sent: 12/18/2023   7:35 AM EST  To: Felix Ashley MD  Subject: FW: Antibiotics                                    ----- Message -----  From: Teri Canela  Sent: 12/16/2023  10:26 AM EST  To: [facility]  Subject: Antibiotics                                      Sorry to bother you on the weekend, but...  I had another episode of jaw pain beginning on Monday, but instead of subsiding in a couple of days, it has progressed to swelling on the inside of my right cheek so that I can't close my jaw enough to chew or speak properly. My teeth on that side also ache since yesterday. I was going to wait until Monday to call the office but during the night I began to experience slight difficulty in swallowing. I called my dentist and he said that it sounded like a medical issue rather than a dental problem, but he was willing to call in an antibiotic prescription if I could not get one from you. He suggested that I not go to Urgent Care in light of my medical history.  What do you advise?

## 2023-12-18 NOTE — TELEPHONE ENCOUNTER
Patient stated that she went to the Dentist and they told her she needs to go to her PCP and get a referral for an x-ray of her mouth that should be a cone beam scan. Also, should the patient come in for assessment due to this situation, please advise

## 2023-12-19 ENCOUNTER — APPOINTMENT (OUTPATIENT)
Dept: LAB | Facility: CLINIC | Age: 73
End: 2023-12-19
Payer: COMMERCIAL

## 2023-12-19 ENCOUNTER — OFFICE VISIT (OUTPATIENT)
Age: 73
End: 2023-12-19
Payer: COMMERCIAL

## 2023-12-19 ENCOUNTER — HOSPITAL ENCOUNTER (OUTPATIENT)
Dept: CT IMAGING | Facility: HOSPITAL | Age: 73
Discharge: HOME | End: 2023-12-19
Payer: COMMERCIAL

## 2023-12-19 VITALS
BODY MASS INDEX: 24.66 KG/M2 | HEIGHT: 62 IN | WEIGHT: 134 LBS | DIASTOLIC BLOOD PRESSURE: 78 MMHG | OXYGEN SATURATION: 98 % | RESPIRATION RATE: 16 BRPM | SYSTOLIC BLOOD PRESSURE: 130 MMHG | HEART RATE: 76 BPM

## 2023-12-19 DIAGNOSIS — R68.84 JAW PAIN: ICD-10-CM

## 2023-12-19 DIAGNOSIS — S03.40XA SPRAIN OF TEMPOROMANDIBULAR JOINT, INITIAL ENCOUNTER: ICD-10-CM

## 2023-12-19 DIAGNOSIS — D69.6 THROMBOCYTOPENIA (HCC): ICD-10-CM

## 2023-12-19 DIAGNOSIS — R68.84 JAW PAIN: Primary | ICD-10-CM

## 2023-12-19 LAB
ALBUMIN SERPL BCP-MCNC: 4.1 G/DL (ref 3.5–5)
ALP SERPL-CCNC: 36 U/L (ref 34–104)
ALT SERPL W P-5'-P-CCNC: 13 U/L (ref 7–52)
ANION GAP SERPL CALCULATED.3IONS-SCNC: 7 MMOL/L
AST SERPL W P-5'-P-CCNC: 29 U/L (ref 13–39)
BASOPHILS # BLD AUTO: 0.04 THOUSANDS/ÂΜL (ref 0–0.1)
BASOPHILS NFR BLD AUTO: 1 % (ref 0–1)
BILIRUB SERPL-MCNC: 0.43 MG/DL (ref 0.2–1)
BUN SERPL-MCNC: 14 MG/DL (ref 5–25)
CALCIUM SERPL-MCNC: 9.6 MG/DL (ref 8.4–10.2)
CHLORIDE SERPL-SCNC: 101 MMOL/L (ref 96–108)
CO2 SERPL-SCNC: 33 MMOL/L (ref 21–32)
CREAT SERPL-MCNC: 0.87 MG/DL (ref 0.6–1.3)
EOSINOPHIL # BLD AUTO: 0.19 THOUSAND/ÂΜL (ref 0–0.61)
EOSINOPHIL NFR BLD AUTO: 2 % (ref 0–6)
ERYTHROCYTE [DISTWIDTH] IN BLOOD BY AUTOMATED COUNT: 14.5 % (ref 11.6–15.1)
GFR SERPL CREATININE-BSD FRML MDRD: 65 ML/MIN/1.73SQ M
GLUCOSE SERPL-MCNC: 69 MG/DL (ref 65–140)
HCT VFR BLD AUTO: 39.9 % (ref 34.8–46.1)
HGB BLD-MCNC: 12.4 G/DL (ref 11.5–15.4)
IMM GRANULOCYTES # BLD AUTO: 0.02 THOUSAND/UL (ref 0–0.2)
IMM GRANULOCYTES NFR BLD AUTO: 0 % (ref 0–2)
LYMPHOCYTES # BLD AUTO: 1.4 THOUSANDS/ÂΜL (ref 0.6–4.47)
LYMPHOCYTES NFR BLD AUTO: 18 % (ref 14–44)
MCH RBC QN AUTO: 28.6 PG (ref 26.8–34.3)
MCHC RBC AUTO-ENTMCNC: 31.1 G/DL (ref 31.4–37.4)
MCV RBC AUTO: 92 FL (ref 82–98)
MONOCYTES # BLD AUTO: 0.83 THOUSAND/ÂΜL (ref 0.17–1.22)
MONOCYTES NFR BLD AUTO: 11 % (ref 4–12)
NEUTROPHILS # BLD AUTO: 5.33 THOUSANDS/ÂΜL (ref 1.85–7.62)
NEUTS SEG NFR BLD AUTO: 68 % (ref 43–75)
NRBC BLD AUTO-RTO: 0 /100 WBCS
PLATELET # BLD AUTO: 301 THOUSANDS/UL (ref 149–390)
PMV BLD AUTO: 10.9 FL (ref 8.9–12.7)
POTASSIUM SERPL-SCNC: 4 MMOL/L (ref 3.5–5.3)
PROT SERPL-MCNC: 7.1 G/DL (ref 6.4–8.4)
RBC # BLD AUTO: 4.34 MILLION/UL (ref 3.81–5.12)
SODIUM SERPL-SCNC: 141 MMOL/L (ref 135–147)
WBC # BLD AUTO: 7.81 THOUSAND/UL (ref 4.31–10.16)

## 2023-12-19 PROCEDURE — 70487 CT MAXILLOFACIAL W/DYE: CPT

## 2023-12-19 PROCEDURE — 99213 OFFICE O/P EST LOW 20 MIN: CPT | Performed by: INTERNAL MEDICINE

## 2023-12-19 PROCEDURE — 36415 COLL VENOUS BLD VENIPUNCTURE: CPT

## 2023-12-19 PROCEDURE — G1004 CDSM NDSC: HCPCS

## 2023-12-19 PROCEDURE — 80053 COMPREHEN METABOLIC PANEL: CPT

## 2023-12-19 PROCEDURE — 85025 COMPLETE CBC W/AUTO DIFF WBC: CPT

## 2023-12-19 RX ADMIN — IOHEXOL 85 ML: 350 INJECTION, SOLUTION INTRAVENOUS at 15:10

## 2023-12-19 NOTE — PROGRESS NOTES
"Assessment/Plan:    Diagnoses and all orders for this visit:    Jaw pain  -     CBC and differential; Future  -     Comprehensive metabolic panel; Future  -     Sedimentation rate, automated; Future  -     CT facial bones w contrast; Future              Patient Instructions   R sided mouth pain - Discussed possible reasons for pain. To further evaluate, please complete bloodwork right now and then complete CT scan of the jaw at earliest convenience. Also report back the dosage of the antibiotic. Follow up with us after CT scan is completed.     Subjective:      Patient ID: Teri Canela is a 73 y.o. female    Here for evaluation of R sided mouth pain.   Started about 1 week ago. Took Ibuprofen which did not help. Went back to tylenol which helped her sleep at night.   Pain became worse at night for which she is now taking 600 mg ibuprofen on instruction of her dentist. This has helped her sleep at night.   Pain does not radiate anywhere but she does state she has some \"pressure\" in her right ear. She also felt some \"pressure\" on right neck. She does complain of pain in her right side of teeth, upper and lower. The pain is not constant and is instead on and off.   Received antibiotics (does not remove her full name but likely amoxicillin) from dentist on Thursday and has been taking it since then.  Saw dentist yesterday who recommends getting a cone beam CT scan for further diagnosis.             Current Outpatient Medications:     Calcium 500 MG tablet, Take by mouth, Disp: , Rfl:     predniSONE 1 mg tablet, Take 4 tablets (4 mg total) by mouth daily Taper as directed (Patient taking differently: Take 4 mg by mouth daily Alternating between 9mg and 9mg), Disp: 120 tablet, Rfl: 3    Progesterone Micronized 10 % CREA, Place on the skin daily , Disp: , Rfl:     risedronate (ACTONEL) 150 MG tablet, TAKE 1 TABLET BY MOUTH EVERY 30 DAYS WITH WATER ON EMPTY STOMACH, NOTHING BY MOUTH OR LIE DOWN FOR NEXT 30 MINUTES., Disp: " 3 tablet, Rfl: 0    rosuvastatin (CRESTOR) 10 MG tablet, TAKE 1 TABLET BY MOUTH EVERY DAY, Disp: 90 tablet, Rfl: 1    predniSONE 5 mg tablet, Take 1 tablet (5 mg total) by mouth daily (Patient not taking: Reported on 12/19/2023), Disp: 90 tablet, Rfl: 1    Recent Results (from the past 1008 hour(s))   Sedimentation rate, automated    Collection Time: 11/15/23  9:57 AM   Result Value Ref Range    Sed Rate 32 (H) 0 - 29 mm/hour   Sedimentation rate, automated    Collection Time: 12/08/23 10:52 AM   Result Value Ref Range    Sed Rate 20 0 - 29 mm/hour       The following portions of the patient's history were reviewed and updated as appropriate: allergies, current medications, past family history, past medical history, past social history, past surgical history and problem list.     Review of Systems   Constitutional:  Negative for chills, fatigue and fever.   HENT:  Positive for dental problem, facial swelling and trouble swallowing. Negative for drooling, hearing loss, sinus pain, sore throat and voice change.    Respiratory:  Negative for cough, shortness of breath and wheezing.    Cardiovascular:  Negative for chest pain, palpitations and leg swelling.   Gastrointestinal:  Negative for abdominal pain, constipation, diarrhea, nausea and vomiting.   Genitourinary:  Negative for dysuria.   Musculoskeletal:  Negative for back pain.   Skin:  Negative for wound.   Neurological:  Negative for dizziness, weakness, light-headedness and headaches.         Objective:      Vitals:    12/19/23 1010   BP: 130/78   Pulse: 76   Resp: 16   SpO2: 98%          Physical Exam  Vitals and nursing note reviewed.   Constitutional:       General: She is not in acute distress.     Appearance: Normal appearance. She is not ill-appearing, toxic-appearing or diaphoretic.   HENT:      Head: Normocephalic.      Ears:      Comments: Extensive earwax in bilateral ears     Mouth/Throat:      Mouth: Mucous membranes are moist.      Pharynx: No  oropharyngeal exudate or posterior oropharyngeal erythema.      Comments: Cavities noted in right lower teeth.  No redness noted bilaterally.  Small nodule is localized to a single point on the exterior area of the face  1 cm anterior to the tragus of right ear.  Tender to palpation  Eyes:      Conjunctiva/sclera: Conjunctivae normal.   Cardiovascular:      Rate and Rhythm: Normal rate and regular rhythm.      Pulses: Normal pulses.      Heart sounds: Normal heart sounds.   Pulmonary:      Effort: Pulmonary effort is normal.      Breath sounds: Normal breath sounds.   Abdominal:      General: Bowel sounds are normal. There is no distension.   Musculoskeletal:      Cervical back: Normal range of motion and neck supple. No rigidity or tenderness.   Lymphadenopathy:      Cervical: No cervical adenopathy.   Skin:     Findings: No erythema.   Neurological:      General: No focal deficit present.      Mental Status: She is alert and oriented to person, place, and time.

## 2023-12-19 NOTE — PATIENT INSTRUCTIONS
R sided mouth pain - Discussed possible reasons for pain. To further evaluate, please complete bloodwork right now and then complete CT scan of the jaw at earliest convenience. Also report back the dosage of the antibiotic. Follow up with us after CT scan is completed.

## 2023-12-21 ENCOUNTER — TELEPHONE (OUTPATIENT)
Age: 73
End: 2023-12-21

## 2023-12-21 NOTE — TELEPHONE ENCOUNTER
I put in an ASAP appointment for [facility] oral maxillofacial and send a staff message to Dr. Don.  Can we please call them and see if we can get her in.  How she doing on the prednisone.  She should taper when improving.

## 2023-12-21 NOTE — TELEPHONE ENCOUNTER
Patient stated that she has completed 40 mg of prednisone and she wants to know if she should continue tapering off the medication or what she should do next, please advise

## 2023-12-22 NOTE — TELEPHONE ENCOUNTER
On a 100% scale, how much better is she?  I'm thinking 30 for 3 days, 20 for 3 days, then back to her regular taper.  Has she heard from [facility]?

## 2023-12-22 NOTE — TELEPHONE ENCOUNTER
Patient said she is ok on prednisone only having leg weakness, what dosing should she do to taper?

## 2023-12-23 DIAGNOSIS — M81.0 AGE-RELATED OSTEOPOROSIS WITHOUT CURRENT PATHOLOGICAL FRACTURE: ICD-10-CM

## 2023-12-27 DIAGNOSIS — M81.0 AGE-RELATED OSTEOPOROSIS WITHOUT CURRENT PATHOLOGICAL FRACTURE: ICD-10-CM

## 2023-12-27 RX ORDER — RISEDRONATE SODIUM 150 MG/1
150 TABLET, FILM COATED ORAL
Qty: 3 TABLET | Refills: 0 | Status: SHIPPED | OUTPATIENT
Start: 2023-12-27

## 2023-12-27 RX ORDER — RISEDRONATE SODIUM 150 MG/1
TABLET, FILM COATED ORAL
Qty: 3 TABLET | Refills: 0 | Status: SHIPPED | OUTPATIENT
Start: 2023-12-27 | End: 2023-12-27

## 2024-01-09 ENCOUNTER — APPOINTMENT (OUTPATIENT)
Dept: LAB | Facility: CLINIC | Age: 74
End: 2024-01-09
Payer: COMMERCIAL

## 2024-01-10 ENCOUNTER — TELEPHONE (OUTPATIENT)
Age: 74
End: 2024-01-10

## 2024-01-10 NOTE — TELEPHONE ENCOUNTER
----- Message from Felix Ashley MD sent at 1/10/2024  6:38 AM EST -----  Sedimentation rate is 20.  How is she doing?  Has she been on 8/9 mg of prednisone?  If so go to 8 mg daily

## 2024-01-30 ENCOUNTER — APPOINTMENT (OUTPATIENT)
Dept: LAB | Facility: CLINIC | Age: 74
End: 2024-01-30
Payer: COMMERCIAL

## 2024-02-12 ENCOUNTER — OFFICE VISIT (OUTPATIENT)
Age: 74
End: 2024-02-12
Payer: COMMERCIAL

## 2024-02-12 VITALS
BODY MASS INDEX: 25.58 KG/M2 | HEART RATE: 86 BPM | HEIGHT: 62 IN | TEMPERATURE: 97.5 F | SYSTOLIC BLOOD PRESSURE: 124 MMHG | DIASTOLIC BLOOD PRESSURE: 82 MMHG | RESPIRATION RATE: 16 BRPM | OXYGEN SATURATION: 97 % | WEIGHT: 139 LBS

## 2024-02-12 DIAGNOSIS — H44.002 EYE INFECTION, LEFT: Primary | ICD-10-CM

## 2024-02-12 PROCEDURE — 99213 OFFICE O/P EST LOW 20 MIN: CPT

## 2024-02-12 RX ORDER — ERYTHROMYCIN 5 MG/G
0.5 OINTMENT OPHTHALMIC
Qty: 5 G | Refills: 0 | Status: SHIPPED | OUTPATIENT
Start: 2024-02-12

## 2024-02-12 NOTE — PROGRESS NOTES
INTERNAL MEDICINE FOLLOW-UP VISIT  [facility] INTERNAL MEDICINE [address]    NAME: Teri Canela  AGE: 73 y.o. SEX: female  : 1950     DATE: 2024     Assessment and Plan:   1. Eye infection, left  Most likely bacterial conjunctivitis.  Discussed applying a ribbon of erythromycin into the lower eyelid daily for 5 days.  Can use warm and cool compresses as needed.  Discussed if this does not resolve after the 5 days to let the office know.  - erythromycin (ILOTYCIN) ophthalmic ointment; Administer 0.5 inches into the left eye daily at bedtime  Dispense: 5 g; Refill: 0        No follow-ups on file.       Chief Complaint:     Chief Complaint   Patient presents with    eye infection     Started a week ago- left eye.      History of Present Illness:   Patient is a 74 yo female that presents today for a left eye infection that started about a week ago. She admits to some itchiness and morning crustiness.  She denies any eye pain, visual disturbances, or photophobia.  It has gotten worse the past few days. She denies any other URI symptoms.    The following portions of the patient's history were reviewed and updated as appropriate: allergies, current medications, past family history, past medical history, past social history, past surgical history and problem list.     Review of Systems:     Review of Systems   Constitutional:  Negative for chills and fever.   HENT:  Negative for ear discharge, ear pain, rhinorrhea, sinus pressure, sinus pain, sore throat and trouble swallowing.    Eyes:  Positive for discharge, redness and itching. Negative for photophobia, pain and visual disturbance.   Respiratory:  Negative for cough.         Past Medical History:     Past Medical History:   Diagnosis Date    Breast cancer (HCC) 1993    right    Hematuria     LAST ASSESSED [id number]        Current Medications:     Current Outpatient Medications:     Calcium 500 MG tablet, Take by  "mouth, Disp: , Rfl:     predniSONE 1 mg tablet, Take 4 tablets (4 mg total) by mouth daily Taper as directed (Patient taking differently: Take 4 mg by mouth daily Alternating between 7mg and 8mg), Disp: 120 tablet, Rfl: 3    Progesterone Micronized 10 % CREA, Place on the skin daily , Disp: , Rfl:     risedronate (ACTONEL) 150 MG tablet, Take 1 tablet (150 mg total) by mouth every 30 (thirty) days TAKE 1 TABLET BY MOUTH EVERY 30 DAYS WITH WATER ON EMPTY STOMACH, NOTHING BY MOUTH OR LIE DOWN FOR NEXT 30 MINUTES., Disp: 3 tablet, Rfl: 0    rosuvastatin (CRESTOR) 10 MG tablet, TAKE 1 TABLET BY MOUTH EVERY DAY, Disp: 90 tablet, Rfl: 1    predniSONE 5 mg tablet, Take 1 tablet (5 mg total) by mouth daily (Patient not taking: Reported on 12/19/2023), Disp: 90 tablet, Rfl: 1     Allergies:   No Known Allergies     Physical Exam:     /82 (BP Location: Left arm)   Pulse 86   Temp 97.5 °F (36.4 °C) (Tympanic)   Resp 16   Ht 5' 2\" (1.575 m)   Wt 63 kg (139 lb)   SpO2 97%   BMI 25.42 kg/m²     Physical Exam  Vitals and nursing note reviewed.   Constitutional:       General: She is awake. She is not in acute distress.     Appearance: Normal appearance. She is well-developed, well-groomed and normal weight.   HENT:      Head: Normocephalic and atraumatic.      Right Ear: Hearing and external ear normal.      Left Ear: Hearing and external ear normal.      Nose: Nose normal.      Mouth/Throat:      Lips: Pink.      Mouth: Mucous membranes are moist.   Eyes:      General: Lids are normal. Vision grossly intact. Gaze aligned appropriately.         Left eye: Discharge present.     Conjunctiva/sclera:      Left eye: Left conjunctiva is injected.   Neck:      Vascular: No carotid bruit.      Trachea: Trachea and phonation normal.   Cardiovascular:      Rate and Rhythm: Normal rate and regular rhythm.      Heart sounds: Normal heart sounds, S1 normal and S2 normal. No murmur heard.     No friction rub. No gallop. "   Pulmonary:      Effort: Pulmonary effort is normal. No respiratory distress.      Breath sounds: Normal breath sounds and air entry. No decreased breath sounds, wheezing, rhonchi or rales.   Abdominal:      General: Abdomen is flat.   Musculoskeletal:         General: No swelling.      Cervical back: Neck supple.      Right lower leg: No edema.      Left lower leg: No edema.   Skin:     General: Skin is warm.      Capillary Refill: Capillary refill takes less than 2 seconds.   Neurological:      Mental Status: She is alert.   Psychiatric:         Attention and Perception: Attention and perception normal.         Mood and Affect: Mood and affect normal.         Speech: Speech normal.         Behavior: Behavior normal. Behavior is cooperative.         Thought Content: Thought content normal.         Cognition and Memory: Cognition and memory normal.         Judgment: Judgment normal.           Data:     Laboratory Results: I have personally reviewed the pertinent laboratory results/reports   Radiology/Other Diagnostic Testing Results: I have personally reviewed pertinent reports.      Christa Tripp PA-C  [facility] INTERNAL MEDICINE [address]

## 2024-02-21 DIAGNOSIS — E78.2 MIXED HYPERLIPIDEMIA: ICD-10-CM

## 2024-02-21 RX ORDER — ROSUVASTATIN CALCIUM 10 MG/1
TABLET, COATED ORAL
Qty: 90 TABLET | Refills: 3 | Status: SHIPPED | OUTPATIENT
Start: 2024-02-21

## 2024-02-22 ENCOUNTER — APPOINTMENT (OUTPATIENT)
Dept: LAB | Facility: HOSPITAL | Age: 74
End: 2024-02-22
Attending: INTERNAL MEDICINE
Payer: COMMERCIAL

## 2024-03-05 ENCOUNTER — OFFICE VISIT (OUTPATIENT)
Dept: RHEUMATOLOGY | Facility: CLINIC | Age: 74
End: 2024-03-05

## 2024-03-05 VITALS
HEIGHT: 62 IN | DIASTOLIC BLOOD PRESSURE: 84 MMHG | WEIGHT: 134 LBS | BODY MASS INDEX: 24.66 KG/M2 | SYSTOLIC BLOOD PRESSURE: 130 MMHG

## 2024-03-05 DIAGNOSIS — M15.0 PRIMARY GENERALIZED (OSTEO)ARTHRITIS: ICD-10-CM

## 2024-03-05 DIAGNOSIS — M81.0 AGE-RELATED OSTEOPOROSIS WITHOUT CURRENT PATHOLOGICAL FRACTURE: ICD-10-CM

## 2024-03-05 DIAGNOSIS — M31.6 TEMPORAL ARTERITIS (HCC): Primary | ICD-10-CM

## 2024-03-05 DIAGNOSIS — Z79.52 LONG TERM (CURRENT) USE OF SYSTEMIC STEROIDS: ICD-10-CM

## 2024-03-05 NOTE — PROGRESS NOTES
Rheumatology Outpatient Consult Note  3/5/2024       Felix Ashley MD  [address]  [address]  [place] [address]    Reason for referral: ?GCA    Assessment: 73 y.o. female referred for the above.    Her current description of her symptoms at the time of dx don't sound classically like GCA and/or PMR to me; however if there was concern for such at the time of the dx then I agree with having put her on prednisone.    I do think it's less likely that she has GCA at this point at least as she has no symptoms concerning for such. No PMR symptoms either. We will wean down prednisone. If during this she develops symptoms concerning for GCA then will discuss possibility of adding Actemra    She is already being treated for OP by another provider with actonel.    Her TMJ issue - based on noninflammatory CT findings wo synovial enhancement, and lack of other inflammatory joint symptoms, very much doubt it's related to a systemic inflammatory arthritis. Infection a possibility; abx were started and prednisone was increased at around the same time so can't tell which one of them helped.    Patient's rheumatologic disease(s) threaten long-term function if not appropriately treated.    Encounter Diagnosis     ICD-10-CM    1. Temporal arteritis (HCC)  M31.6       2. Age-related osteoporosis without current pathological fracture  M81.0       3. Primary generalized (osteo)arthritis  M15.0       4. Long term (current) use of systemic steroids  Z79.52           Plan:  -Taper prednisone by 1 mg daily weekly  -Let me know if symptoms change/worsen  -RTC 3 mos or sooner PRN    Stuart Oviedo DO, FLOYD Oviedo DO, FLOYD  [facility]     History: Teri Canela is a(n) 73 y.o. female who is referred for the above. In 2022 was seen in PCP office for jaw pain, scalp pain, and shoulder pain and stiffness (not all at once; started in the upper R arm, then every day was in a different place and the previous one was  gone, last place was in the jaw); there was concern for GCA. ESR 61, CRP 10. No visual symptoms at the time. Had temporal areterial duplex, no evidence of GCA. Never had TAB. Was started on high-dose prednisone by PCP.    After this presumptive dx, developed symptoms she was concerned were SEs from prednisone; blurry vision, lots of weakness in the legs, started immediately. At first she thought it was from retaining fluid, but it never got better. After she got below 20 mg daily of the prednisone, then started to feel better. Then other things started happening; almost a year ago, developed sudden swelling in the L leg.    Recently saw PCP with tenderness to palpation and swelling of the TMJ; CT revealed R TMJ effusion. Prednisone was increased and she was referred to [facility]. Did notice eye blurriness and leg weakness when on the higher doses of prednisone. Was told by them that it was from clenching her jaw. Going to a higher dose of prednisone cleared the swelling up quickly, but pain got better.    First day was R upper arm. Next day that had gotten better and was in the R clavicle. Then went to the other clavicle. Then went down the other arm, all the way down to the elbow; rest of the pain resolved. After it got to he jaw, it went away; not sure if the pain went away due to the prednisone or not. She does not remember having scalp pain. Was NOT having any shoulder stiffness. Was not having headaches. Doesn't remember scalp tenderness. No vision changes at that time. Didn't have any jaw fatigue.    Currently on 7 mg daily prednisone.    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    Denies:  Fever  Rash  Uveitis  Dysphagia/odynophagia  CP  SOB at  "rest  Pleurisy  Stomach pain  Constipation/bloating  Hematochezia  Gross hematuria  Joint issues other than noted above        Past Medical History:   Diagnosis Date    Breast cancer (HCC) 03/01/1993    right    Hematuria     LAST ASSESSED [id number]       Past Surgical History:   Procedure Laterality Date    BREAST LUMPECTOMY Right 04/01/1993    COLONOSCOPY      FL INJECTION LEFT HIP (NON ARTHROGRAM)  1/19/2021    LYMPH NODE BIOPSY      MOHS SURGERY      MA ARTHRP ACETBLR/PROX FEM PROSTC AGRFT/ALGRFT Left 6/3/2021    Procedure: ARTHROPLASTY HIP TOTAL;  Surgeon: Jean Carlos Costa MD;  Location: [facility];  Service: Orthopedics       Outpatient Medications Marked as Taking for the 3/5/24 encounter (Office Visit) with Stuart Oviedo DO   Medication    Calcium 500 MG tablet    predniSONE 1 mg tablet    predniSONE 5 mg tablet    Progesterone Micronized 10 % CREA    risedronate (ACTONEL) 150 MG tablet    rosuvastatin (CRESTOR) 10 MG tablet       Allergies as of 03/05/2024    (No Known Allergies)       Family History   Problem Relation Age of Onset    Breast cancer Mother 70    No Known Problems Sister     No Known Problems Daughter     No Known Problems Sister     No Known Problems Sister     No Known Problems Sister     No Known Problems Daughter        Social History:  Social History     Tobacco Use    Smoking status: Never    Smokeless tobacco: Never   Vaping Use    Vaping status: Never Used   Substance Use Topics    Alcohol use: Yes    Drug use: No       Review of Systems: Pertinent findings documented in HPI    ___________________________________    Physical Exam:    /84   Ht 5' 2\" (1.575 m)   Wt 60.8 kg (134 lb)   BMI 24.51 kg/m²     General: Well appearing, in no distress.   Eyes: EOMI  HENT: No oral ulcers. MMM.   Extremities: Warm, well perfused, no edema.   Neuro: Alert and oriented.  Skin: No rashes.  Musculoskeletal exam: no significant tenderness to palpation any examined joint, no synovitis any " joint  Muscle strength   Right   Left    Shoulder abduction 5/5  Shoulder abduction 5/5   Shoulder adduction 5/5  Shoulder adduction 5/5   Shoulder internal rotation 5/5  Shoulder internal rotation 5/5   Shoulder external rotation 5/5  Shoulder external rotation 5/5   Hip flexion 5/5  Hip flexion 5/5   Hip extension 5/5  Hip extension 5/5   Hip abduction 5/5  Hip abduction 5/5   Hip adduction 5/5  Hip adduction 5/5          Neck   Flexion 5/5   Extension 5/5   R sidebending 5/5   L sidebending 5/5     ____________________________    Lab Result Review: relevant labs reviewed, as outlined in HPI    Imaging Result Review: relevant images reviewed, as outlined in HPI    DXA: reviewed

## 2024-03-05 NOTE — PATIENT INSTRUCTIONS
Continue 7 mg daily of prednisone for now    In the second half of March, take 6 mg prednisone daily  In the first half of April, take 5 mg prednisone daily  In the second half of April, take 4 mg prednisone daily  In the first half of May, take 3 mg prednisone daily  In the second half of May, take 2 mg prednisone daily until you see me next    Please let me know immediately if you develop darkening of your vision or double vision or other new vision changes, pain or easy fatigability of your jaw when chewing, tenderness of your scalp to touch, or new headaches.

## 2024-03-13 DIAGNOSIS — M31.6 TEMPORAL ARTERITIS (HCC): ICD-10-CM

## 2024-03-13 RX ORDER — PREDNISONE 1 MG/1
4 TABLET ORAL DAILY
Qty: 120 TABLET | Refills: 3 | Status: SHIPPED | OUTPATIENT
Start: 2024-03-13

## 2024-03-18 DIAGNOSIS — M81.0 AGE-RELATED OSTEOPOROSIS WITHOUT CURRENT PATHOLOGICAL FRACTURE: ICD-10-CM

## 2024-03-18 RX ORDER — RISEDRONATE SODIUM 150 MG/1
150 TABLET, FILM COATED ORAL
Qty: 3 TABLET | Refills: 3 | Status: SHIPPED | OUTPATIENT
Start: 2024-03-18

## 2024-03-25 ENCOUNTER — OFFICE VISIT (OUTPATIENT)
Age: 74
End: 2024-03-25
Payer: COMMERCIAL

## 2024-03-25 VITALS
WEIGHT: 134 LBS | BODY MASS INDEX: 24.66 KG/M2 | RESPIRATION RATE: 16 BRPM | OXYGEN SATURATION: 98 % | SYSTOLIC BLOOD PRESSURE: 136 MMHG | HEART RATE: 84 BPM | DIASTOLIC BLOOD PRESSURE: 84 MMHG | HEIGHT: 62 IN

## 2024-03-25 DIAGNOSIS — H10.12 ACUTE ATOPIC CONJUNCTIVITIS OF LEFT EYE: ICD-10-CM

## 2024-03-25 DIAGNOSIS — M54.16 LUMBAR RADICULOPATHY, ACUTE: Primary | ICD-10-CM

## 2024-03-25 PROCEDURE — 99214 OFFICE O/P EST MOD 30 MIN: CPT | Performed by: INTERNAL MEDICINE

## 2024-03-25 PROCEDURE — G2211 COMPLEX E/M VISIT ADD ON: HCPCS | Performed by: INTERNAL MEDICINE

## 2024-03-25 NOTE — PROGRESS NOTES
Assessment/Plan:    Diagnoses and all orders for this visit:    Lumbar radiculopathy, acute  -     Ambulatory Referral to Physical Therapy; Future    Acute atopic conjunctivitis of left eye              Patient Instructions   History and exam most consistent with lumbar radiculopathy.  Will refer for physical therapy.  If symptoms worsen or fail to improve would proceed with neuroimaging with lumbar spine MRI.    Conjunctivitis-try Zaditor over-the-counter.  Contact me if symptoms worsen or fail to improve.  Contact your ophthalmologist immediately for any loss of visual acuity.    Subjective:      Patient ID: Teri Canela is a 73 y.o. female    Acute patient visit with complaint of pain and tingling left lower extremity.  She notes that in the weeks preceding this she had intermittent tingling involving either arms or legs but now it seems to have localized into the left lower extremity.  She also notes a persistent 8 out of 10 pain surrounding the left greater trochanter and gluteal region that limits her ability to walk to about 20 minutes.  Pain resolves when she sits and rests.  Pain and tingling do not occur or resolve simultaneously.  She denies any injury or trauma.  Notably she has been tapering off of prednisone under care of rheumatology over the last 3 weeks.  Currently taking 6 mg.    She had conjunctivitis left eye several weeks ago that resolved with erythromycin ointment.  The following week she had similar in the right eye again resolving with erythromycin ointment.  Yesterday she noted swollen vessels in the left eye without any crusting, erythema or exudate.  She does note having abundant sneezing fits over the last week or 2          Current Outpatient Medications:     Calcium 500 MG tablet, Take by mouth, Disp: , Rfl:     predniSONE 1 mg tablet, TAKE 4 TABLETS (4 MG TOTAL) BY MOUTH DAILY TAPER AS DIRECTED (Patient taking differently: Take 6 mg by mouth daily Taper as directed), Disp: 120  tablet, Rfl: 3    Progesterone Micronized 10 % CREA, Place on the skin daily , Disp: , Rfl:     risedronate (ACTONEL) 150 MG tablet, TAKE 1 TABLET (150 MG TOTAL) BY MOUTH EVERY 30 (THIRTY) DAYS TAKE 1 TABLET BY MOUTH EVERY 30 DAYS WITH WATER ON EMPTY STOMACH, NOTHING BY MOUTH OR LIE DOWN FOR NEXT 30 MINUTES., Disp: 3 tablet, Rfl: 3    rosuvastatin (CRESTOR) 10 MG tablet, TAKE 1 TABLET BY MOUTH EVERY DAY, Disp: 90 tablet, Rfl: 3    erythromycin (ILOTYCIN) ophthalmic ointment, Administer 0.5 inches into the left eye daily at bedtime (Patient not taking: Reported on 3/25/2024), Disp: 5 g, Rfl: 0    predniSONE 5 mg tablet, Take 1 tablet (5 mg total) by mouth daily (Patient not taking: Reported on 3/25/2024), Disp: 90 tablet, Rfl: 1    Recent Results (from the past 1008 hour(s))   Sedimentation rate, automated    Collection Time: 02/22/24  2:06 PM   Result Value Ref Range    Sed Rate 13 0 - 29 mm/hour       The following portions of the patient's history were reviewed and updated as appropriate: allergies, current medications, past family history, past medical history, past social history, past surgical history and problem list.     Review of Systems   Constitutional:  Negative for appetite change, chills, diaphoresis, fatigue, fever and unexpected weight change.   HENT:  Negative for congestion, hearing loss and rhinorrhea.    Eyes:  Negative for visual disturbance.   Respiratory:  Negative for cough, chest tightness, shortness of breath and wheezing.    Cardiovascular:  Negative for chest pain, palpitations and leg swelling.   Gastrointestinal:  Negative for abdominal pain and blood in stool.   Endocrine: Negative for cold intolerance, heat intolerance, polydipsia and polyuria.   Genitourinary:  Negative for difficulty urinating, dysuria, frequency and urgency.   Musculoskeletal:  Positive for arthralgias and gait problem. Negative for myalgias.   Skin:  Negative for rash.   Neurological:  Negative for dizziness,  weakness, light-headedness and headaches.   Hematological:  Does not bruise/bleed easily.   Psychiatric/Behavioral:  Negative for dysphoric mood and sleep disturbance.          Objective:      Vitals:    03/25/24 1419   BP: 136/84   Pulse: 84   Resp: 16   SpO2: 98%          Physical Exam  Constitutional:       Appearance: She is well-developed.   HENT:      Head: Normocephalic and atraumatic.   Eyes:      Extraocular Movements: Extraocular movements intact.      Pupils: Pupils are equal, round, and reactive to light.      Comments: Prominent scleral vasculature with minimal erythema of the conjunctiva   Pulmonary:      Effort: Pulmonary effort is normal.   Musculoskeletal:      Comments: Hip girdles appear symmetric, no tenderness over left greater trochanter, mild tenderness in the deep left gluteal region.  Able to step up independently on 11 inch exam table step with bilateral legs.  Bilateral straight leg raise elicits cramping pain in hip flexors.  Full range of motion of the left hip with no pain through range of motion.   Neurological:      Mental Status: She is alert and oriented to person, place, and time.      Cranial Nerves: No cranial nerve deficit.   Psychiatric:         Behavior: Behavior normal. Behavior is cooperative.         Thought Content: Thought content normal.         Judgment: Judgment normal.

## 2024-03-25 NOTE — PATIENT INSTRUCTIONS
History and exam most consistent with lumbar radiculopathy.  Will refer for physical therapy.  If symptoms worsen or fail to improve would proceed with neuroimaging with lumbar spine MRI.    Conjunctivitis-try Zaditor over-the-counter.  Contact me if symptoms worsen or fail to improve.  Contact your ophthalmologist immediately for any loss of visual acuity.

## 2024-04-04 ENCOUNTER — EVALUATION (OUTPATIENT)
Dept: PHYSICAL THERAPY | Facility: CLINIC | Age: 74
End: 2024-04-04
Payer: COMMERCIAL

## 2024-04-04 DIAGNOSIS — M54.16 LUMBAR RADICULOPATHY, ACUTE: Primary | ICD-10-CM

## 2024-04-04 PROCEDURE — 97110 THERAPEUTIC EXERCISES: CPT | Performed by: PHYSICAL THERAPIST

## 2024-04-04 PROCEDURE — 97161 PT EVAL LOW COMPLEX 20 MIN: CPT | Performed by: PHYSICAL THERAPIST

## 2024-04-04 NOTE — PROGRESS NOTES
PT Evaluation     Today's date: 2024  Patient name: Teri Canela  : 1950  MRN: [id number]  Referring provider: Felix Ashley MD  Dx:   Encounter Diagnosis     ICD-10-CM    1. Lumbar radiculopathy, acute  M54.16 Ambulatory Referral to Physical Therapy                     Assessment  Assessment details: Patient was provided a home exercise program and demonstrated an understanding of exercises.  Patient was advised to stop performing home exercise program if symptoms increase or new complaints developed.  Verbal understanding demonstrated regarding home exercise program instructions.  Patient would benefit from skilled physical therapy services for prescribed exercises, manual interventions, neuromuscular re-education, education, and modalities as deemed appropriate to assist patient in achieving their maximum level of function.    Patient presents motivated to reduce/ abolish and learn to manage future episodes of Left LS pain, intermittent le tingling.  Evaluation reveals:  lumbar flexion bias, mild core/ hip weakness, guarded gait pattern, (-) tenderness with palpation, left LE short - slightly long with supine   She presents with lumbar stenosis type symptoms on assessment     On a side note - she is moving out of the area in 2 weeks thus focus will be on building her a solid HEP for her to continue with upon departure.   She is following up with primary care 24 with possible MRI to be scheduled.       Impairments: abnormal gait, abnormal or restricted ROM, activity intolerance, impaired physical strength, lacks appropriate home exercise program and pain with function  Understanding of Dx/Px/POC: good  Goals  STG   1.  Patient will demonstrate independence and competence with HEP 2 -4 weeks  2.  Patient will report > 25-50% reduced pain 2-4 weeks    LTG   1.  Patient will report improvements with both functional and recreational abilities  4-6 weeks  2.  Patient will demonstrate improved motor  "function  4-6 weeks.   3.  Abolished LE tingling/ left LS pain  4-8 weeks.     Plan  Plan details: Patient response to treatment will be monitored each session and progressed accordingly    Thank you for this referral.   Patient would benefit from: skilled physical therapy  Planned modality interventions: thermotherapy: hydrocollator packs  Planned therapy interventions: IADL retraining, manual therapy, patient education, postural training, strengthening, stretching, therapeutic exercise, flexibility, home exercise program and neuromuscular re-education  Frequency: 2x week  Duration in weeks: 4  Treatment plan discussed with: patient    Subjective Evaluation    History of Present Illness  Mechanism of injury: Patient started with tingling in bilateral arms - then it moved to bilateral legs ( random locations )   \"Most of the time I have nothing\" but I randomly have tingling  As time moved on - the tingling in the arms dissipated but the left leg has become more  noteable.  Started in dec 2023    Dec 2023 - she started with left lateral hip region pain which she massaged out  This returned approximately 6 weeks ago  It comes on after 10 min of standing or 15 min of walking    The pain builds up until she sits down for a bit.       Patient Goals  Patient goals for therapy: decreased pain, increased strength, independence with ADLs/IADLs and return to sport/leisure activities    Pain  Current pain ratin  At worst pain ratin  Quality: radiating (\"the pain is between sharp and dull)  Relieving factors: change in position  Aggravating factors: standing and walking  Progression: worsening    Social Support  Lives with: spouse      Diagnostic Tests  No diagnostic tests performed      Objective     Concurrent Complaints  Negative for disturbed sleep, bladder dysfunction and bowel dysfunction    Postural Observations  Seated posture: fair  Standing posture: fair  Correction of posture: has no consistent " effect      Palpation   Left   No palpable tenderness to the lumbar paraspinals and quadratus lumborum.     Right   No palpable tenderness to the lumbar paraspinals and quadratus lumborum.     Tenderness     Lumbar Spine  No tenderness in the spinous process.     Left Hip   No tenderness in the PSIS.     Right Hip   No tenderness in the PSIS.     Neurological Testing     Sensation     Lumbar   Left   Intact: light touch    Right   Intact: light touch    Active Range of Motion     Lumbar   Flexion:  WFL  Extension:  Restriction level: moderate    Joint Play     Hypomobile: L1, L2, L3, L4 and L5   Mechanical Assessment    Cervical      Thoracic      Lumbar    Standing flexion: repeated movements   Pain location:no change  Pain intensity: better  Lying flexion: repeated movements  Pain location: no change  Standing extension: repeated movements  Pain location: no change  Lying extension: repeated movements  Pain location: no change    Strength/Myotome Testing     Left Hip   Planes of Motion   Flexion: 4  Extension: 4  Abduction: 4  External rotation: 4  Internal rotation: 4    Right Hip   Planes of Motion   Flexion: 4  Extension: 4  Abduction: 4  External rotation: 4+  Internal rotation: 4+    Left Knee   Flexion: 4+  Extension: 4+    Right Knee   Flexion: 4+  Extension: 4+    Left Ankle/Foot   Dorsiflexion: 5    Right Ankle/Foot   Dorsiflexion: 5    Tests     Lumbar     Left   Negative crossed SLR, femoral stretch, passive SLR and slump test.     Right   Negative crossed SLR, femoral stretch, passive SLR and slump test.     Left Hip   Negative JOSEP and FADIR.     Right Hip   Negative JOSEP and FADIR.     General Comments:      Lumbar Comments  GAIT - cautious gait - right knee valgum, heel - toe            Precautions: lumbar radiculopathy  PMH:  L MAGDALENA, Osteoporosis, arthritis      [email or web address]  Access Code: [email or web address]    POC expires Unit limit Auth Expiration date PT/OT/ST + Visit Limit?   5/4/24 bomn na  After 24th visit                           Visit/Unit Tracking  AUTH Status:  Date 4/4              na Used 1               Remaining  23                        Manuals 4/4                                                                Neuro Re-Ed                          HL TA marching 20x alt             HL TB hip abd             Tb clamshells                                                    Ther Ex             bike             aktc 5s x 10            ppt 3s x 20            ltr 5s x 10             Hss w/ strap 20s x 3             DLS s/a / leg lowering             bridging             Seated ff in chair                          Squats - STS                                                                 Ther Activity                                       Gait Training                                       Modalities

## 2024-04-10 ENCOUNTER — OFFICE VISIT (OUTPATIENT)
Dept: PHYSICAL THERAPY | Facility: CLINIC | Age: 74
End: 2024-04-10
Payer: COMMERCIAL

## 2024-04-10 DIAGNOSIS — M54.16 LUMBAR RADICULOPATHY, ACUTE: Primary | ICD-10-CM

## 2024-04-10 PROCEDURE — 97112 NEUROMUSCULAR REEDUCATION: CPT | Performed by: PHYSICAL THERAPIST

## 2024-04-10 PROCEDURE — 97110 THERAPEUTIC EXERCISES: CPT | Performed by: PHYSICAL THERAPIST

## 2024-04-10 NOTE — PROGRESS NOTES
"  Daily Note     Today's date: 4/10/2024  Patient name: Teri Canela  : 1950  MRN: [id number]  Referring provider: Felix Ashley MD  Dx:   Encounter Diagnosis     ICD-10-CM    1. Lumbar radiculopathy, acute  M54.16                      Subjective: patient reports that she was sick over the weekend and feels like she is still recovering   Does not feel much pain today - rather just very tired overall.   Notes feeling good post session      Objective: See treatment diary below      Assessment: Tolerated treatment fair. Patient demonstrated fatigue post treatment and would benefit from continued PT  No change in symptoms with program additions today     Plan: Continue per plan of care.  Progress treatment as tolerated.       Precautions: lumbar radiculopathy  PMH:  L MAGDALENA, Osteoporosis, arthritis      [email or web address]  Access Code: [email or web address]    POC expires Unit limit Auth Expiration date PT/OT/ST + Visit Limit?   24 bomn na After  visit                           Visit/Unit Tracking  AUTH Status:  Date 4/4 4/10             na Used 1 2              Remaining  23 22                       Manuals 4/4 4/10                                                                 Neuro Re-Ed                          HL TA marching 20x alt  5s x 20           HL TB hip abd  Grn x 20 3s           Tb clamshells  Grn x 20                                                     Ther Ex               bike  6'            aktc 5s x 10 5s x 10            ppt 3s x 20 3s x 20           ltr 5s x 10  5s x 10            Hss w/ strap 20s x 3  20s x 3            DLS s/a / leg lowering  10x ea Alt           bridging  3s x 20            Seated ff in chair  10s x 10                        Squats - STS  20\" x 20                                                                Ther Activity                                       Gait Training                                       Modalities                                            "
29-Sep-2020 20:00

## 2024-04-12 ENCOUNTER — OFFICE VISIT (OUTPATIENT)
Dept: PHYSICAL THERAPY | Facility: CLINIC | Age: 74
End: 2024-04-12
Payer: COMMERCIAL

## 2024-04-12 DIAGNOSIS — M54.16 LUMBAR RADICULOPATHY, ACUTE: Primary | ICD-10-CM

## 2024-04-12 PROCEDURE — 97112 NEUROMUSCULAR REEDUCATION: CPT

## 2024-04-12 PROCEDURE — 97110 THERAPEUTIC EXERCISES: CPT

## 2024-04-12 NOTE — PROGRESS NOTES
"Daily Note     Today's date: 2024  Patient name: Teri Canela  : 1950  MRN: [id number]  Referring provider: Felix Ashley MD  Dx:   Encounter Diagnosis     ICD-10-CM    1. Lumbar radiculopathy, acute  M54.16                      Subjective: Patient noted daily compliance with current HEP.      Objective: See treatment diary below      Assessment: Tolerated treatment well. Updated HEP. Patient exhibited good technique with therapeutic exercises and would benefit from continued PT      Plan: Continue per plan of care.  Progress treatment as tolerated.       Precautions: lumbar radiculopathy  PMH:  L MAGDALENA, Osteoporosis, arthritis      [email or web address]  Access Code: [email or web address]    POC expires Unit limit Auth Expiration date PT/OT/ST + Visit Limit?   24 bomn na After  visit                           Visit/Unit Tracking  AUTH Status:  Date 4/4 4/10 4/12            na Used 1 2 3             Remaining  23 22 21                      Manuals 4/4 4/10 4/12                                                                Neuro Re-Ed                          HL TA marching 20x alt  5s x 20 :05  20x          HL TB hip abd  Grn x 20 3s GTB  20x          Tb clamshells  Grn x 20  GTB  20x                                                   Ther Ex               bike  6'  6'          aktc 5s x 10 5s x 10  :05  10x          ppt 3s x 20 3s x 20 :03  20x          ltr 5s x 10  5s x 10  :05  10x           Hss w/ strap 20s x 3  20s x 3  :20  3x           DLS s/a / leg lowering  10x ea Alt UE/LE  10x          bridging  3s x 20  :03  20x           Seated ff in chair  10s x 10 3 way  :10  5x each                       Squats - STS  20\" x 20  20x  No UE A                                                              Ther Activity                                       Gait Training                                       Modalities                                              "

## 2024-04-15 ENCOUNTER — OFFICE VISIT (OUTPATIENT)
Dept: PHYSICAL THERAPY | Facility: CLINIC | Age: 74
End: 2024-04-15
Payer: COMMERCIAL

## 2024-04-15 DIAGNOSIS — M54.16 LUMBAR RADICULOPATHY, ACUTE: Primary | ICD-10-CM

## 2024-04-15 PROCEDURE — 97112 NEUROMUSCULAR REEDUCATION: CPT | Performed by: PHYSICAL THERAPIST

## 2024-04-15 PROCEDURE — 97110 THERAPEUTIC EXERCISES: CPT | Performed by: PHYSICAL THERAPIST

## 2024-04-15 NOTE — PROGRESS NOTES
"Daily Note     Today's date: 4/15/2024  Patient name: Teri Canela  : 1950  MRN: [id number]  Referring provider: Felix Ashley MD  Dx:   Encounter Diagnosis     ICD-10-CM    1. Lumbar radiculopathy, acute  M54.16                      Subjective: Patient notes that she is more \"tired\" than painful when she tries to perform activities.   Notes left medial/ inferior knee is \"not happy\" while performing TG today   \"I have a lot less of the tingling \"       Objective: See treatment diary below      Assessment: Tolerated treatment well.       Plan: Continue per plan of care.  Progress treatment as tolerated.     Add weight to le's next session      Precautions: lumbar radiculopathy  PMH:  L MAGDALENA, Osteoporosis, arthritis      [email or web address]  Access Code: [email or web address]    POC expires Unit limit Auth Expiration date PT/OT/ST + Visit Limit?   24 bomn na After  visit                           Visit/Unit Tracking  AUTH Status:  Date 4/4 4/10 4/12 4/15           na Used 1 2 3 4- foto             Remaining  23 22 21 20                      Manuals 4/4 4/10 4/12 4/15                                                               Neuro Re-Ed                          HL TA marching 20x alt  5s x 20 :05  20x Slow alt x 20          HL TB hip abd  Grn x 20 3s GTB  20x Btb x 20         Tb clamshells  Grn x 20  GTB  20x Btb x 20          TG L10    20 x 2                                     Ther Ex               bike  6'  6' 6'         aktc 5s x 10 5s x 10  :05  10x 5s x 10          ppt 3s x 20 3s x 20 :03  20x 3s x 20         ltr 5s x 10  5s x 10  :05  10x  5s x 10         Hss w/ strap 20s x 3  20s x 3  :20  3x  20s x 3          DLS s/a / leg lowering  10x ea Alt UE/LE  10x Alt/ Lower x 20 ea          bridging  3s x 20  :03  20x  3s x 20          Seated ff in chair  10s x 10 3 way  :10  5x each 3 way 10s x 5 ea                      Squats - STS  20\" x 20  20x  No UE A 20\" x 20                                        "                       Ther Activity                                       Gait Training                                       Modalities

## 2024-04-18 ENCOUNTER — OFFICE VISIT (OUTPATIENT)
Dept: PHYSICAL THERAPY | Facility: CLINIC | Age: 74
End: 2024-04-18
Payer: COMMERCIAL

## 2024-04-18 DIAGNOSIS — M54.16 LUMBAR RADICULOPATHY, ACUTE: Primary | ICD-10-CM

## 2024-04-18 PROCEDURE — 97110 THERAPEUTIC EXERCISES: CPT | Performed by: PHYSICAL THERAPIST

## 2024-04-18 PROCEDURE — 97112 NEUROMUSCULAR REEDUCATION: CPT | Performed by: PHYSICAL THERAPIST

## 2024-04-18 NOTE — PROGRESS NOTES
DISCHARGE SUMMARY/   Daily Note     Today's date: 2024  Patient name: Teri Canela  : 1950  MRN: [id number]  Referring provider: Felix Ashley MD  Dx:   Encounter Diagnosis     ICD-10-CM    1. Lumbar radiculopathy, acute  M54.16                      Subjective: patient reports that she is moving and has been doing a lot of packing and moving items   this has caused her overall pain to elevate with increased left LE / hip symptoms today by her account.   She notes that she cannot bear weight thru left le fully without increased discomfort produced.     She notes less pain following MET/ left LE long leg traction     Objective: See treatment diary below    Goals  STG   1.  Patient will demonstrate independence and competence with HEP 2 -4 weeks  MET  2.  Patient will report > 25-50% reduced pain 2-4 weeks  NOT MET    LTG   1.  Patient will report improvements with both functional and recreational abilities  4-6 weeks  ONGOING  2.  Patient will demonstrate improved motor function  4-6 weeks.   Partially MET  3.  Abolished LE tingling/ left LS pain  4-8 weeks.   Partially MET        Assessment: Tolerated treatment well.   Patient is demonstrating independence and competence with HEP    she has made some gains towards original goals.       Plan:discharge skilled PT at this time -  patient is moving out of the area and will be stopping PT today.   She is to continue with her HEP that she is demonstrating competency with         Precautions: lumbar radiculopathy  PMH:  L MAGDALENA, Osteoporosis, arthritis      [email or web address]  Access Code: [email or web address]    POC expires Unit limit Auth Expiration date PT/OT/ST + Visit Limit?   24 bomn na After  visit                           Visit/Unit Tracking  AUTH Status:  Date 4/4 4/10 4/12 4/15 4/18          na Used 1 2 3 4- foto  5           Remaining  23 22 21 20  19                    Manuals 4/4 4/10 4/12 4/15 4/18                     Left long leg traction    "   db                                    Neuro Re-Ed                          HL TA marching 20x alt  5s x 20 :05  20x Slow alt x 20  Slow alt x 20         HL TB hip abd  Grn x 20 3s GTB  20x Btb x 20 Btb x 20        Tb clamshells  Grn x 20  GTB  20x Btb x 20  Btb x 20         TG L10    20 x 2           MET left hip ext, right hip abd     10s x 3         Self met R hip flex  L hip ext     10s x 3                                  Ther Ex               bike  6'  6' 6' 5'        aktc 5s x 10 5s x 10  :05  10x 5s x 10  5s x 10         ppt 3s x 20 3s x 20 :03  20x 3s x 20 3s x 20        ltr 5s x 10  5s x 10  :05  10x  5s x 10 5s x 10         Hss w/ strap 20s x 3  20s x 3  :20  3x  20s x 3  20s x  3        DLS s/a / leg lowering  10x ea Alt UE/LE  10x Alt/ Lower x 20 ea  Alt / lower x 20 ea         bridging  3s x 20  :03  20x  3s x 20  3s x 20         Seated ff in chair  10s x 10 3 way  :10  5x each 3 way 10s x 5 ea 10s x 10                      Squats - STS  20\" x 20  20x  No UE A 20\" x 20  20\" x 20                                                             Ther Activity                                       Gait Training                                       Modalities                                              "

## 2024-04-19 ENCOUNTER — RA CDI HCC (OUTPATIENT)
Dept: OTHER | Facility: HOSPITAL | Age: 74
End: 2024-04-19

## 2024-04-24 ENCOUNTER — RA CDI HCC (OUTPATIENT)
Dept: OTHER | Facility: HOSPITAL | Age: 74
End: 2024-04-24

## 2024-04-26 ENCOUNTER — TELEPHONE (OUTPATIENT)
Age: 74
End: 2024-04-26

## 2024-04-26 ENCOUNTER — PATIENT MESSAGE (OUTPATIENT)
Dept: RHEUMATOLOGY | Facility: CLINIC | Age: 74
End: 2024-04-26

## 2024-04-26 DIAGNOSIS — M31.6 TEMPORAL ARTERITIS (HCC): ICD-10-CM

## 2024-04-29 LAB — HBA1C MFR BLD HPLC: 5.6 %

## 2024-05-01 ENCOUNTER — OFFICE VISIT (OUTPATIENT)
Age: 74
End: 2024-05-01
Payer: COMMERCIAL

## 2024-05-01 VITALS
DIASTOLIC BLOOD PRESSURE: 70 MMHG | HEIGHT: 62 IN | WEIGHT: 134 LBS | SYSTOLIC BLOOD PRESSURE: 122 MMHG | BODY MASS INDEX: 24.66 KG/M2

## 2024-05-01 DIAGNOSIS — R73.01 IMPAIRED FASTING GLUCOSE: ICD-10-CM

## 2024-05-01 DIAGNOSIS — R01.1 CARDIAC MURMUR: ICD-10-CM

## 2024-05-01 DIAGNOSIS — I10 PRIMARY HYPERTENSION: Primary | ICD-10-CM

## 2024-05-01 DIAGNOSIS — E78.2 MIXED HYPERLIPIDEMIA: ICD-10-CM

## 2024-05-01 DIAGNOSIS — Z96.642 STATUS POST TOTAL HIP REPLACEMENT, LEFT: ICD-10-CM

## 2024-05-01 DIAGNOSIS — Z79.52 LONG TERM SYSTEMIC STEROID USER: ICD-10-CM

## 2024-05-01 PROCEDURE — 1160F RVW MEDS BY RX/DR IN RCRD: CPT | Performed by: INTERNAL MEDICINE

## 2024-05-01 PROCEDURE — 3288F FALL RISK ASSESSMENT DOCD: CPT | Performed by: INTERNAL MEDICINE

## 2024-05-01 PROCEDURE — 1170F FXNL STATUS ASSESSED: CPT | Performed by: INTERNAL MEDICINE

## 2024-05-01 PROCEDURE — G0439 PPPS, SUBSEQ VISIT: HCPCS | Performed by: INTERNAL MEDICINE

## 2024-05-01 PROCEDURE — 1125F AMNT PAIN NOTED PAIN PRSNT: CPT | Performed by: INTERNAL MEDICINE

## 2024-05-01 PROCEDURE — 3078F DIAST BP <80 MM HG: CPT | Performed by: INTERNAL MEDICINE

## 2024-05-01 PROCEDURE — 1159F MED LIST DOCD IN RCRD: CPT | Performed by: INTERNAL MEDICINE

## 2024-05-01 PROCEDURE — 99214 OFFICE O/P EST MOD 30 MIN: CPT | Performed by: INTERNAL MEDICINE

## 2024-05-01 PROCEDURE — 3074F SYST BP LT 130 MM HG: CPT | Performed by: INTERNAL MEDICINE

## 2024-05-01 PROCEDURE — 3725F SCREEN DEPRESSION PERFORMED: CPT | Performed by: INTERNAL MEDICINE

## 2024-05-01 RX ORDER — PREDNISONE 1 MG/1
TABLET ORAL
Qty: 120 TABLET | Refills: 3 | Status: SHIPPED | OUTPATIENT
Start: 2024-05-01

## 2024-05-01 NOTE — PROGRESS NOTES
Assessment and Plan:     Problem List Items Addressed This Visit          Cardiovascular and Mediastinum    Hypertension - Primary       Endocrine    Impaired fasting glucose       Surgery/Wound/Pain    Status post total hip replacement, left       Other    Hyperlipidemia    Long term systemic steroid user    Cardiac murmur        Preventive health issues were discussed with patient, and age appropriate screening tests were ordered as noted in patient's After Visit Summary.  Personalized health advice and appropriate referrals for health education or preventive services given if needed, as noted in patient's After Visit Summary.     History of Present Illness:     Patient presents for a Medicare Wellness Visit    F/u mmp and review labs   Feeling generally well   Moved to [place]  Busy unpacking  Lumbar radiculopathy-L Hip pain worse w/ packing, has some home PT exercises, but time was limited w/ move, plans to get back to it  now.   Knee pain and stiffness have improved.  Swelling better d/t compression stockings.  Now using prn.  Still doing PT.    ?Temporal arteritis-she has weaned down to 3 milligrams of prednisone daily since last week. Has f/u w/ rheum next mo    Osteoporosis-tolerating risedronate monthly   Hip stable s/p MAGDALENA 6/21  HTN-stable off rx, no home bp's.     HPL-tolerating crestor            Patient Care Team:  Felix Ashley MD as PCP - General  Felix Ashley MD as PCP - [facility]CP-[facility] ([facility]  Felix Ashley MD as PCP - PCP-[facility] ([facility])  MD Alonzo Reddy MD     Review of Systems:     Review of Systems   Constitutional:  Negative for appetite change, chills, diaphoresis, fatigue, fever and unexpected weight change.   HENT:  Negative for congestion, hearing loss and rhinorrhea.    Eyes:  Negative for visual disturbance.   Respiratory:  Negative for cough, chest tightness, shortness of breath and wheezing.    Cardiovascular:  Negative for chest pain,  palpitations and leg swelling.   Gastrointestinal:  Negative for abdominal pain and blood in stool.   Endocrine: Negative for cold intolerance, heat intolerance, polydipsia and polyuria.   Genitourinary:  Negative for difficulty urinating, dysuria, frequency and urgency.   Musculoskeletal:  Positive for arthralgias. Negative for myalgias.   Skin:  Negative for rash.   Neurological:  Negative for dizziness, weakness, light-headedness and headaches.   Hematological:  Does not bruise/bleed easily.   Psychiatric/Behavioral:  Negative for dysphoric mood and sleep disturbance.         Problem List:     Patient Active Problem List   Diagnosis    Hyperlipidemia    Hypertension    Microhematuria    Personal history of breast cancer    Renal cyst    Thrombocytopenia (HCC)    Arthritis of left hip    Status post total hip replacement, left    Edema of left lower extremity    Screening mammogram for high-risk patient    Impaired fasting glucose    Myalgia    Temporal arteritis (HCC)    Long term systemic steroid user    Cardiac murmur      Past Medical and Surgical History:     Past Medical History:   Diagnosis Date    Breast cancer (HCC) 03/01/1993    right    Hematuria     LAST ASSESSED [id number]     Past Surgical History:   Procedure Laterality Date    BREAST LUMPECTOMY Right 04/01/1993    COLONOSCOPY      FL INJECTION LEFT HIP (NON ARTHROGRAM)  1/19/2021    LYMPH NODE BIOPSY      MOHS SURGERY      ME ARTHRP ACETBLR/PROX FEM PROSTC AGRFT/ALGRFT Left 6/3/2021    Procedure: ARTHROPLASTY HIP TOTAL;  Surgeon: Jean Carlos Costa MD;  Location: [facility];  Service: Orthopedics      Family History:     Family History   Problem Relation Age of Onset    Breast cancer Mother 70    No Known Problems Sister     No Known Problems Daughter     No Known Problems Sister     No Known Problems Sister     No Known Problems Sister     No Known Problems Daughter       Social History:     Social History     Socioeconomic History    Marital status:  /Civil Union     Spouse name: None    Number of children: None    Years of education: None    Highest education level: None   Occupational History    None   Tobacco Use    Smoking status: Never    Smokeless tobacco: Never   Vaping Use    Vaping status: Never Used   Substance and Sexual Activity    Alcohol use: Yes    Drug use: No    Sexual activity: Not Currently   Other Topics Concern    None   Social History Narrative    NO ADVANCED DIRECTIVES      Social Determinants of Health     Financial Resource Strain: Low Risk  (4/21/2023)    Overall Financial Resource Strain (CARDIA)     Difficulty of Paying Living Expenses: Not hard at all   Food Insecurity: Not on file   Transportation Needs: No Transportation Needs (4/21/2023)    PRAPARE - Transportation     Lack of Transportation (Medical): No     Lack of Transportation (Non-Medical): No   Physical Activity: Sufficiently Active (7/26/2022)    Exercise Vital Sign     Days of Exercise per Week: 7 days     Minutes of Exercise per Session: 30 min   Stress: No Stress Concern Present (5/24/2021)    [employer] [place] [employer] - Occupational Stress Questionnaire     Feeling of Stress : Not at all   Social Connections: Not on file   Intimate Partner Violence: Not on file   Housing Stability: Not on file      Medications and Allergies:     Current Outpatient Medications   Medication Sig Dispense Refill    Calcium 500 MG tablet Take by mouth      Progesterone Micronized 10 % CREA Place on the skin daily       risedronate (ACTONEL) 150 MG tablet TAKE 1 TABLET (150 MG TOTAL) BY MOUTH EVERY 30 (THIRTY) DAYS TAKE 1 TABLET BY MOUTH EVERY 30 DAYS WITH WATER ON EMPTY STOMACH, NOTHING BY MOUTH OR LIE DOWN FOR NEXT 30 MINUTES. 3 tablet 3    rosuvastatin (CRESTOR) 10 MG tablet TAKE 1 TABLET BY MOUTH EVERY DAY 90 tablet 3    erythromycin (ILOTYCIN) ophthalmic ointment Administer 0.5 inches into the left eye daily at bedtime (Patient not taking: Reported on 3/25/2024) 5  g 0    predniSONE 1 mg tablet Taper as directed: In the first half of May, take 3 mg prednisone daily. In the second half of May onward, take 2 mg prednisone daily until you see me next 120 tablet 3     No current facility-administered medications for this visit.     No Known Allergies   Immunizations:     Immunization History   Administered Date(s) Administered    COVID-19 PFIZER VACCINE 0.3 ML IM 03/10/2021, 04/01/2021, 12/10/2021    COVID-19 Pfizer Vac BIVALENT Tom-sucrose 12 Yr+ IM 11/30/2022    COVID-19 Pfizer mRNA vacc PF tom-sucrose 12 yr and older (Comirnaty) 11/10/2023    INFLUENZA 12/17/2018, 10/22/2020, 10/21/2021, 12/01/2022    Influenza Split High Dose Preservative Free IM 12/17/2018    Influenza, high dose seasonal 0.7 mL 01/11/2024    Influenza, seasonal, injectable 1950    Pneumococcal Conjugate 13-Valent 12/09/2015    Pneumococcal Polysaccharide PPV23 07/20/2017    Respiratory Syncytial Virus Vaccine (Recombinant, Adjuvanted) 12/15/2023    Tdap 1950    Zoster 01/01/2012    Zoster Vaccine Recombinant 01/01/2012, 10/22/2020, 01/08/2021    influenza, trivalent, adjuvanted 11/12/2019      Health Maintenance:         Topic Date Due    Colorectal Cancer Screening  06/18/2023    DXA SCAN  11/22/2023    Breast Cancer Screening: Mammogram  06/06/2024    Hepatitis C Screening  Completed         Topic Date Due    COVID-19 Vaccine (5 - 2023-24 season) 01/05/2024      Medicare Screening Tests and Risk Assessments:         Depression Screening:   PHQ-2 Score: 0      Advance Care Planning:   Living will: Yes    Advanced directive: Yes      Cognitive Screening:   Provider or family/friend/caregiver concerned regarding cognition?: No    PREVENTIVE SCREENINGS      Cardiovascular Screening:    General: Screening Not Indicated and History Lipid Disorder      Diabetes Screening:     General: Screening Current      Colorectal Cancer Screening:     General: Screening Current      Breast Cancer Screening:      "General: History Breast Cancer      Cervical Cancer Screening:    General: Screening Not Indicated      Osteoporosis Screening:    General: Screening Not Indicated and History Osteoporosis      Abdominal Aortic Aneurysm (AAA) Screening:        General: Screening Not Indicated      Lung Cancer Screening:     General: Screening Not Indicated      Hepatitis C Screening:    General: Screening Current    No results found.     Physical Exam:     /70   Ht 5' 2\" (1.575 m)   Wt 60.8 kg (134 lb)   BMI 24.51 kg/m²     Physical Exam  Vitals and nursing note reviewed.   Constitutional:       General: She is not in acute distress.     Appearance: She is well-developed.   HENT:      Head: Normocephalic and atraumatic.   Eyes:      Conjunctiva/sclera: Conjunctivae normal.   Cardiovascular:      Rate and Rhythm: Normal rate and regular rhythm.      Heart sounds: Murmur heard.   Pulmonary:      Effort: Pulmonary effort is normal. No respiratory distress.      Breath sounds: Normal breath sounds.   Abdominal:      Palpations: Abdomen is soft.      Tenderness: There is no abdominal tenderness.   Musculoskeletal:         General: No swelling.      Cervical back: Neck supple.   Skin:     General: Skin is warm and dry.      Capillary Refill: Capillary refill takes less than 2 seconds.   Neurological:      Mental Status: She is alert.   Psychiatric:         Mood and Affect: Mood normal.          Felix Ashley MD  "

## 2024-05-01 NOTE — PATIENT INSTRUCTIONS
Lab data reviewed in detail and compared to prior  Hyperlipidemia-stable with rosuvastatin  Blood pressure stable without medication  Cardiac murmur-echocardiogram from March 2023 reviewed and no significant valvular disease.  Would consider repeat echo in another 6 to 12 months to assure stability  Lumbar radiculopathy-continue with home PT.  Contact me if symptoms fail to improve to consider referral back to PT versus MRI versus other.  Questionable history of temporal arteritis-patient has tolerated weaning from steroids now under care of of rheumatology with follow-up next month.  Osteoporosis-continue on risedronate        Medicare Preventive Visit Patient Instructions  Thank you for completing your Welcome to Medicare Visit or Medicare Annual Wellness Visit today. Your next wellness visit will be due in one year (5/2/2025).  The screening/preventive services that you may require over the next 5-10 years are detailed below. Some tests may not apply to you based off risk factors and/or age. Screening tests ordered at today's visit but not completed yet may show as past due. Also, please note that scanned in results may not display below.  Preventive Screenings:  Service Recommendations Previous Testing/Comments   Colorectal Cancer Screening  * Colonoscopy    * Fecal Occult Blood Test (FOBT)/Fecal Immunochemical Test (FIT)  * Fecal DNA/Cologuard Test  * Flexible Sigmoidoscopy Age: 45-75 years old   Colonoscopy: every 10 years (may be performed more frequently if at higher risk)  OR  FOBT/FIT: every 1 year  OR  Cologuard: every 3 years  OR  Sigmoidoscopy: every 5 years  Screening may be recommended earlier than age 45 if at higher risk for colorectal cancer. Also, an individualized decision between you and your healthcare provider will decide whether screening between the ages of 76-85 would be appropriate. Colonoscopy: 06/18/2018  FOBT/FIT: Not on file  Cologuard: Not on file  Sigmoidoscopy: Not on  file    Screening Current     Breast Cancer Screening Age: 40+ years old  Frequency: every 1-2 years  Not required if history of left and right mastectomy Mammogram: 06/06/2023    History Breast Cancer   Cervical Cancer Screening Between the ages of 21-29, pap smear recommended once every 3 years.   Between the ages of 30-65, can perform pap smear with HPV co-testing every 5 years.   Recommendations may differ for women with a history of total hysterectomy, cervical cancer, or abnormal pap smears in past. Pap Smear: Not on file    Screening Not Indicated   Hepatitis C Screening Once for adults born between 1945 and 1965  More frequently in patients at high risk for Hepatitis C Hep C Antibody: 02/20/2020    Screening Current   Diabetes Screening 1-2 times per year if you're at risk for diabetes or have pre-diabetes Fasting glucose: 81 mg/dL (10/25/2023)  A1C: 5.6 (4/29/2024)  Screening Current   Cholesterol Screening Once every 5 years if you don't have a lipid disorder. May order more often based on risk factors. Lipid panel: 10/25/2023    Screening Not Indicated  History Lipid Disorder     Other Preventive Screenings Covered by Medicare:  Abdominal Aortic Aneurysm (AAA) Screening: covered once if your at risk. You're considered to be at risk if you have a family history of AAA.  Lung Cancer Screening: covers low dose CT scan once per year if you meet all of the following conditions: (1) Age 55-77; (2) No signs or symptoms of lung cancer; (3) Current smoker or have quit smoking within the last 15 years; (4) You have a tobacco smoking history of at least 20 pack years (packs per day multiplied by number of years you smoked); (5) You get a written order from a healthcare provider.  Glaucoma Screening: covered annually if you're considered high risk: (1) You have diabetes OR (2) Family history of glaucoma OR (3)  aged 50 and older OR (4)  American aged 65 and older  Osteoporosis Screening:  covered every 2 years if you meet one of the following conditions: (1) You're estrogen deficient and at risk for osteoporosis based off medical history and other findings; (2) Have a vertebral abnormality; (3) On glucocorticoid therapy for more than 3 months; (4) Have primary hyperparathyroidism; (5) On osteoporosis medications and need to assess response to drug therapy.   Last bone density test (DXA Scan): 11/22/2021.  HIV Screening: covered annually if you're between the age of 15-65. Also covered annually if you are younger than 15 and older than 65 with risk factors for HIV infection. For pregnant patients, it is covered up to 3 times per pregnancy.    Immunizations:  Immunization Recommendations   Influenza Vaccine Annual influenza vaccination during flu season is recommended for all persons aged >= 6 months who do not have contraindications   Pneumococcal Vaccine   * Pneumococcal conjugate vaccine = PCV13 (Prevnar 13), PCV15 (Vaxneuvance), PCV20 (Prevnar 20)  * Pneumococcal polysaccharide vaccine = PPSV23 (Pneumovax) Adults 19-63 yo with certain risk factors or if 65+ yo  If never received any pneumonia vaccine: recommend Prevnar 20 (PCV20)  Give PCV20 if previously received 1 dose of PCV13 or PPSV23   Hepatitis B Vaccine 3 dose series if at intermediate or high risk (ex: diabetes, end stage renal disease, liver disease)   Respiratory syncytial virus (RSV) Vaccine - COVERED BY MEDICARE PART D  * RSVPreF3 (Arexvy) CDC recommends that adults 60 years of age and older may receive a single dose of RSV vaccine using shared clinical decision-making (SCDM)   Tetanus (Td) Vaccine - COST NOT COVERED BY MEDICARE PART B Following completion of primary series, a booster dose should be given every 10 years to maintain immunity against tetanus. Td may also be given as tetanus wound prophylaxis.   Tdap Vaccine - COST NOT COVERED BY MEDICARE PART B Recommended at least once for all adults. For pregnant patients, recommended  with each pregnancy.   Shingles Vaccine (Shingrix) - COST NOT COVERED BY MEDICARE PART B  2 shot series recommended in those 19 years and older who have or will have weakened immune systems or those 50 years and older     Health Maintenance Due:      Topic Date Due    Colorectal Cancer Screening  06/18/2023    DXA SCAN  11/22/2023    Breast Cancer Screening: Mammogram  06/06/2024    Hepatitis C Screening  Completed     Immunizations Due:      Topic Date Due    COVID-19 Vaccine (5 - 2023-24 season) 01/05/2024     Advance Directives   What are advance directives?  Advance directives are legal documents that state your wishes and plans for medical care. These plans are made ahead of time in case you lose your ability to make decisions for yourself. Advance directives can apply to any medical decision, such as the treatments you want, and if you want to donate organs.   What are the types of advance directives?  There are many types of advance directives, and each state has rules about how to use them. You may choose a combination of any of the following:  Living will:  This is a written record of the treatment you want. You can also choose which treatments you do not want, which to limit, and which to stop at a certain time. This includes surgery, medicine, IV fluid, and tube feedings.   Durable power of  for healthcare (DPAHC):  This is a written record that states who you want to make healthcare choices for you when you are unable to make them for yourself. This person, called a proxy, is usually a family member or a friend. You may choose more than 1 proxy.  Do not resuscitate (DNR) order:  A DNR order is used in case your heart stops beating or you stop breathing. It is a request not to have certain forms of treatment, such as CPR. A DNR order may be included in other types of advance directives.  Medical directive:  This covers the care that you want if you are in a coma, near death, or unable to make  decisions for yourself. You can list the treatments you want for each condition. Treatment may include pain medicine, surgery, blood transfusions, dialysis, IV or tube feedings, and a ventilator (breathing machine).  Values history:  This document has questions about your views, beliefs, and how you feel and think about life. This information can help others choose the care that you would choose.  Why are advance directives important?  An advance directive helps you control your care. Although spoken wishes may be used, it is better to have your wishes written down. Spoken wishes can be misunderstood, or not followed. Treatments may be given even if you do not want them. An advance directive may make it easier for your family to make difficult choices about your care.       © Copyright [employer] 2018 Information is for End User's use only and may not be sold, redistributed or otherwise used for commercial purposes. All illustrations and images included in CareNotes® are the copyrighted property of [employer] or [employer]

## 2024-05-01 NOTE — PROGRESS NOTES
Assessment and Plan:       Problem List Items Addressed This Visit    None       Preventive health issues were discussed with patient, and age appropriate screening tests were ordered as noted in patient's After Visit Summary.  Personalized health advice and appropriate referrals for health education or preventive services given if needed, as noted in patient's After Visit Summary.     History of Present Illness:     Patient presents for a Medicare Wellness Visit      Patient Care Team:  Felix Ashley MD as PCP - General  Felix Ashley MD as PCP - PCP-[facility]  Felix [facility]  MD Alonzo Reddy MD     Review of Systems:     Review of Systems     Problem List:     Patient Active Problem List   Diagnosis    Hyperlipidemia    Hypertension    Microhematuria    Personal history of breast cancer    Renal cyst    Thrombocytopenia (HCC)    Arthritis of left hip    Status post total hip replacement, left    Edema of left lower extremity    Screening mammogram for high-risk patient    Impaired fasting glucose    Myalgia    Temporal arteritis (HCC)    Long term systemic steroid user        Past Medical and Surgical History:     Past Medical History:   Diagnosis Date    Breast cancer (HCC) 03/01/1993    right    Hematuria     LAST ASSESSED [id number]     Past Surgical History:   Procedure Laterality Date    BREAST LUMPECTOMY Right 04/01/1993    COLONOSCOPY      FL INJECTION LEFT HIP (NON ARTHROGRAM)  1/19/2021    LYMPH NODE BIOPSY      MOHS SURGERY      KS ARTHRP ACETBLR/PROX FEM PROSTC AGRFT/ALGRFT Left 6/3/2021    Procedure: ARTHROPLASTY HIP TOTAL;  Surgeon: Jean Carlos Costa MD;  Location: [facility];  Service: Orthopedics        Family History:     Family History   Problem Relation Age of Onset    Breast cancer Mother 70    No Known Problems Sister     No Known Problems Daughter     No Known Problems Sister     No Known Problems Sister     No Known  Problems Sister     No Known Problems Daughter         Social History:     Social History     Socioeconomic History    Marital status: /Civil Union     Spouse name: None    Number of children: None    Years of education: None    Highest education level: None   Occupational History    None   Tobacco Use    Smoking status: Never    Smokeless tobacco: Never   Vaping Use    Vaping status: Never Used   Substance and Sexual Activity    Alcohol use: Yes    Drug use: No    Sexual activity: Not Currently   Other Topics Concern    None   Social History Narrative    NO ADVANCED DIRECTIVES      Social Determinants of Health     Financial Resource Strain: Low Risk  (4/21/2023)    Overall Financial Resource Strain (CARDIA)     Difficulty of Paying Living Expenses: Not hard at all   Food Insecurity: Not on file   Transportation Needs: No Transportation Needs (4/21/2023)    PRAPARE - Transportation     Lack of Transportation (Medical): No     Lack of Transportation (Non-Medical): No   Physical Activity: Sufficiently Active (7/26/2022)    Exercise Vital Sign     Days of Exercise per Week: 7 days     Minutes of Exercise per Session: 30 min   Stress: No Stress Concern Present (5/24/2021)    [employer] - Occupational Stress Questionnaire     Feeling of Stress : Not at all   Social Connections: Not on file   Intimate Partner Violence: Not on file   Housing Stability: Not on file        Medications and Allergies:     Current Outpatient Medications   Medication Sig Dispense Refill    Calcium 500 MG tablet Take by mouth      predniSONE 1 mg tablet TAKE 4 TABLETS (4 MG TOTAL) BY MOUTH DAILY TAPER AS DIRECTED (Patient taking differently: Take 6 mg by mouth daily Taper as directed) 120 tablet 3    Progesterone Micronized 10 % CREA Place on the skin daily       risedronate (ACTONEL) 150 MG tablet TAKE 1 TABLET (150 MG TOTAL) BY MOUTH EVERY 30 (THIRTY) DAYS TAKE 1 TABLET BY MOUTH EVERY 30 DAYS WITH WATER ON  EMPTY STOMACH, NOTHING BY MOUTH OR LIE DOWN FOR NEXT 30 MINUTES. 3 tablet 3    rosuvastatin (CRESTOR) 10 MG tablet TAKE 1 TABLET BY MOUTH EVERY DAY 90 tablet 3    erythromycin (ILOTYCIN) ophthalmic ointment Administer 0.5 inches into the left eye daily at bedtime (Patient not taking: Reported on 3/25/2024) 5 g 0    predniSONE 5 mg tablet Take 1 tablet (5 mg total) by mouth daily (Patient not taking: Reported on 3/25/2024) 90 tablet 1     No current facility-administered medications for this visit.     No Known Allergies     Immunizations:     Immunization History   Administered Date(s) Administered    COVID-19 PFIZER VACCINE 0.3 ML IM 03/10/2021, 04/01/2021, 12/10/2021    COVID-19 Pfizer Vac BIVALENT Tom-sucrose 12 Yr+ IM 11/30/2022    COVID-19 Pfizer mRNA vacc PF tom-sucrose 12 yr and older (Comirnaty) 11/10/2023    INFLUENZA 12/17/2018, 10/22/2020, 10/21/2021, 12/01/2022    Influenza Split High Dose Preservative Free IM 12/17/2018    Influenza, high dose seasonal 0.7 mL 01/11/2024    Influenza, seasonal, injectable 1950    Pneumococcal Conjugate 13-Valent 12/09/2015    Pneumococcal Polysaccharide PPV23 07/20/2017    Respiratory Syncytial Virus Vaccine (Recombinant, Adjuvanted) 12/15/2023    Tdap 1950    Zoster 01/01/2012    Zoster Vaccine Recombinant 01/01/2012, 10/22/2020, 01/08/2021    influenza, trivalent, adjuvanted 11/12/2019        Health Maintenance:         Topic Date Due    Colorectal Cancer Screening  06/18/2023    DXA SCAN  11/22/2023    Breast Cancer Screening: Mammogram  06/06/2024    Hepatitis C Screening  Completed         Topic Date Due    COVID-19 Vaccine (5 - 2023-24 season) 01/05/2024      Medicare Screening Tests and Risk Assessments:         Health Risk Assessment:   Patient rates overall health as good. Patient feels that their physical health rating is slightly worse. Patient is satisfied with their life. Eyesight was rated as same. Hearing was rated as same. Patient feels that  their emotional and mental health rating is same. Patients states they are never, rarely angry. Patient states they are never, rarely unusually tired/fatigued. Pain experienced in the last 7 days has been none. Patient states that she has experienced no weight loss or gain in last 6 months.     Depression Screening:   PHQ-2 Score: 0      Fall Risk Screening:   In the past year, patient has experienced: no history of falling in past year      Urinary Incontinence Screening:   Patient has not leaked urine accidently in the last six months.     Home Safety:  Patient has trouble with stairs inside or outside of their home. Patient has working smoke alarms and has working carbon monoxide detector. Home safety hazards include: none.     Nutrition:   Current diet is Regular.     Medications:   Patient is not currently taking any over-the-counter supplements. Patient is able to manage medications.     Activities of Daily Living (ADLs)/Instrumental Activities of Daily Living (IADLs):   Walk and transfer into and out of bed and chair?: Yes  Dress and groom yourself?: Yes    Bathe or shower yourself?: Yes    Feed yourself? Yes  Do your laundry/housekeeping?: Yes  Manage your money, pay your bills and track your expenses?: Yes  Make your own meals?: Yes    Do your own shopping?: Yes    Previous Hospitalizations:   Any hospitalizations or ED visits within the last 12 months?: No      Advance Care Planning:   Living will: Yes    Durable POA for healthcare: Yes    Advanced directive: Yes      Cognitive Screening:   Provider or family/friend/caregiver concerned regarding cognition?: No    PREVENTIVE SCREENINGS      Cardiovascular Screening:    General: Screening Not Indicated and History Lipid Disorder      Diabetes Screening:     General: Screening Current      Colorectal Cancer Screening:     General: Screening Current      Breast Cancer Screening:     General: History Breast Cancer      Cervical Cancer Screening:    General:  "Screening Not Indicated      Osteoporosis Screening:    General: Screening Not Indicated and History Osteoporosis      Lung Cancer Screening:     General: Screening Not Indicated      Hepatitis C Screening:    General: Screening Current    Screening, Brief Intervention, and Referral to Treatment (SBIRT)    Screening  Typical number of drinks in a day: 0  Typical number of drinks in a week: 0  Interpretation: Low risk drinking behavior.    Single Item Drug Screening:  How often have you used an illegal drug (including marijuana) or a prescription medication for non-medical reasons in the past year? never    Single Item Drug Screen Score: 0  Interpretation: Negative screen for possible drug use disorder    No results found.     Physical Exam:     Ht 5' 2\" (1.575 m)   Wt 60.8 kg (134 lb)   BMI 24.51 kg/m²       Physical Exam    "

## 2024-06-05 ENCOUNTER — APPOINTMENT (OUTPATIENT)
Dept: LAB | Facility: CLINIC | Age: 74
End: 2024-06-05
Payer: COMMERCIAL

## 2024-06-05 ENCOUNTER — OFFICE VISIT (OUTPATIENT)
Dept: RHEUMATOLOGY | Facility: CLINIC | Age: 74
End: 2024-06-05
Payer: COMMERCIAL

## 2024-06-05 ENCOUNTER — HOSPITAL ENCOUNTER (OUTPATIENT)
Dept: RADIOLOGY | Facility: HOSPITAL | Age: 74
Discharge: HOME | End: 2024-06-05
Payer: COMMERCIAL

## 2024-06-05 VITALS
HEIGHT: 62 IN | DIASTOLIC BLOOD PRESSURE: 76 MMHG | SYSTOLIC BLOOD PRESSURE: 122 MMHG | BODY MASS INDEX: 24.11 KG/M2 | WEIGHT: 131 LBS

## 2024-06-05 DIAGNOSIS — Z79.52 LONG TERM (CURRENT) USE OF SYSTEMIC STEROIDS: Primary | ICD-10-CM

## 2024-06-05 DIAGNOSIS — M54.50 CHRONIC LOW BACK PAIN, UNSPECIFIED BACK PAIN LATERALITY, UNSPECIFIED WHETHER SCIATICA PRESENT: ICD-10-CM

## 2024-06-05 DIAGNOSIS — G89.29 CHRONIC LOW BACK PAIN, UNSPECIFIED BACK PAIN LATERALITY, UNSPECIFIED WHETHER SCIATICA PRESENT: ICD-10-CM

## 2024-06-05 PROCEDURE — 99214 OFFICE O/P EST MOD 30 MIN: CPT | Performed by: STUDENT IN AN ORGANIZED HEALTH CARE EDUCATION/TRAINING PROGRAM

## 2024-06-05 PROCEDURE — 72110 X-RAY EXAM L-2 SPINE 4/>VWS: CPT

## 2024-06-05 RX ORDER — PREDNISONE 1 MG/1
TABLET ORAL
Start: 2024-06-05

## 2024-06-05 NOTE — ASSESSMENT & PLAN NOTE
Symptoms not consistent with/concerning for GCA or PMR, doubtful that she ever had GCA to begin with    No worsening of symptoms with prednisone taper, will continue 1 mg daily for 1 week then stop    If no concerning GCA or PMR symptoms at next visit, then does not need rheumatology follow-up after that

## 2024-06-05 NOTE — PROGRESS NOTES
"Ambulatory Visit  Name: Teri Canela      : 1950      MRN: [id number]  Encounter Provider: Stuart Oviedo DO  Encounter Date: 2024   Encounter department: [facility] RHEUMATOLOGY [facility] history: first saw me Mar 2024 for ?GCA; her descriptions of symptoms did not sound like GCA and/or PMR. Weaned down prednisone.    Also with OP on actonel with PCP    Assessment & Plan   1. Long term (current) use of systemic steroids  Assessment & Plan:  Symptoms not consistent with/concerning for GCA or PMR, doubtful that she ever had GCA to begin with    No worsening of symptoms with prednisone taper, will continue 1 mg daily for 1 week then stop    If no concerning GCA or PMR symptoms at next visit, then does not need rheumatology follow-up after that  Orders:  -     predniSONE 1 mg tablet; Take 1 pill daily for one more week, then stop  2. Chronic low back pain, unspecified back pain laterality, unspecified whether sciatica present  -     XR spine lumbar minimum 4 views non injury; Future      History of Present Illness       Has been on 1 mg prednisone daily for about 1 week    No significant joint stiffness, no shoulder weakness    For the past 3 months or so, gets intermittent pain around the L hip, sometimes in the L lateral calf. Worse when standing and leaning forward, not painful when sitting    No vision changes/diplopia/amaurosis fugax, jaw pain/claudication, new headache, scalp tenderness    Review of Systems    Objective     /76   Ht 5' 2\" (1.575 m)   Wt 59.4 kg (131 lb)   BMI 23.96 kg/m²      General: Well appearing, in no distress.   Eyes: Sclera non-icteric. EOMI  HENT: No oral ulcers. MMM.   Extremities: Warm, well perfused, no edema.   Neuro: Alert and oriented. No gross focal neurological deficits.   Skin: No rashes.  MSK exam: no tenderness to palpation or synovitis any joint or spine  Muscle strength   Right   Left    Shoulder abduction 5/5  Shoulder abduction " 5/5   Shoulder adduction 5/5  Shoulder adduction 5/5   Shoulder internal rotation 5/5  Shoulder internal rotation 5/5   Shoulder external rotation 5/5  Shoulder external rotation 5/5   Hip flexion 5/5  Hip flexion 5/5   Hip extension 5/5  Hip extension 5/5   Hip abduction 5/5  Hip abduction 5/5   Hip adduction 5/5  Hip adduction 5/5          Neck   Flexion 5/5   Extension 5/5   R sidebending 5/5   L sidebending 5/5           Physical Exam  Administrative Statements

## 2024-06-05 NOTE — PATIENT INSTRUCTIONS
Continue taking 1 mg prednisone daily for one more week, then stop    You can get your low back x-ray when you get a chance    Please let me know immediately if you develop darkening of your vision or double vision or other new vision changes, pain or easy fatigability of your jaw when chewing, tenderness of your scalp to touch, or new headaches.

## 2024-09-13 ENCOUNTER — TELEPHONE (OUTPATIENT)
Dept: RHEUMATOLOGY | Facility: CLINIC | Age: 74
End: 2024-09-13

## 2024-09-13 ENCOUNTER — TELEMEDICINE (OUTPATIENT)
Dept: RHEUMATOLOGY | Facility: CLINIC | Age: 74
End: 2024-09-13
Payer: COMMERCIAL

## 2024-09-13 DIAGNOSIS — Z92.241 HISTORY OF RECENT STEROID USE: Primary | ICD-10-CM

## 2024-09-13 PROCEDURE — 99213 OFFICE O/P EST LOW 20 MIN: CPT | Performed by: STUDENT IN AN ORGANIZED HEALTH CARE EDUCATION/TRAINING PROGRAM

## 2024-09-13 NOTE — TELEPHONE ENCOUNTER
Patient called in returning sharon call , unfortunately the patient will not be able to log in her virtual appointment at 10am, she has not had breakfast yet .

## 2024-09-13 NOTE — PROGRESS NOTES
Telemedicine consent    Patient: Teri Canela  Provider: Stuart Oviedo DO  Provider located at [facility]  [address] [address]    The patient was identified by name and date of birth. Teri Canela was informed that this is a telemedicine visit and that the visit is being conducted through the [employer] platform. She agrees to proceed..  My office door was closed. No one else was in the room.  She acknowledged consent and understanding of privacy and security of the video platform. The patient has agreed to participate and understands they can discontinue the visit at any time.    Patient is aware this is a billable service.     I spent 6 minutes with the patient during this visit.    Ambulatory Visit  Name: Teri Canela      : 1950      MRN: [id number]  Encounter Provider: Stuart Oviedo DO  Encounter Date: 2024   Encounter department: [facility]    Assessment & Plan  History of recent steroid use  No signs or symptoms of GCA now or at any previous point. Off of steroids.       Follow up with primary care, no Rheumatology follow-up needed for these symptoms    History of Present Illness       Has been totally off prednisone for about three months    Feeling much more normal since finishing prednisone     Has a tingling in the L hand, pins and needles in the fingers. Her new PCP is working this up, has ordered an EMG.    Knees worse, not painful but very stiff.    No vision changes/diplopia/amaurosis fugax, jaw pain/claudication, new headache, scalp tenderness    She notes that a month before the symptoms started, she had COVID. Her  had it too; about 2 weeks after he recovered, he also got very sick. Then, patient started with her symptoms.    Permanent history: first saw me Mar 2024 for ?GCA; her descriptions of symptoms did not sound like GCA and/or PMR. Weaned down  prednisone.     Also with OP on actonel with PCP              Objective     There were no vitals taken for this visit.    General: well-appearing, NAD

## 2024-10-02 ENCOUNTER — TELEPHONE (OUTPATIENT)
Age: 74
End: 2024-10-02

## 2024-10-02 NOTE — TELEPHONE ENCOUNTER
Patients GI provider:  Dr. Grewal    Number to return call: [phone]    Reason for call: Malathi from [facility] calling on behalf of mutual Pt to request the records from her last colonoscopy, she say they faxed a request for this to us some time ago but had not received anything yet. I do see that Pt had a scope with  on 06.18.18 but I Don't see anything from [facility] in the chart requesting this, also the number Malathi provided that she originally used for the fax was not one I could verify for us. I gave her the [phone] number and advised she re fax the request and that I would let the office know that would be coming their way. Number above is Malathi's contact in case we need to reach out to her.

## 2024-10-04 NOTE — TELEPHONE ENCOUNTER
Called Celeste for fax number [phone] - Faxed patient colonoscopy dated 06/18/2018  Memory Ok 10/04/2024 9:32am

## 2025-03-01 DIAGNOSIS — M81.0 AGE-RELATED OSTEOPOROSIS WITHOUT CURRENT PATHOLOGICAL FRACTURE: ICD-10-CM

## 2025-03-03 RX ORDER — RISEDRONATE SODIUM 150 MG/1
TABLET, FILM COATED ORAL
Qty: 3 TABLET | Refills: 2 | Status: SHIPPED | OUTPATIENT
Start: 2025-03-03

## 2025-05-06 NOTE — PROGRESS NOTES
DISCHARGE /   Daily Note     Today's date: 2023  Patient name: Devorah March  : [birth year]  MRN: [id number]  Referring provider: Maureen Morocho MD  Dx:   Encounter Diagnosis     ICD-10-CM    1. Pain in lateral portion of left knee  M25.562                      Subjective:  Patient states that overall she feels a little stronger and will be adding some of the new exercises she has learned to her daily regimen. She still gets flare ups but notes that they may be less intense. Onset remains random      Objective: See treatment diary below    Goals  STG   1. Patient will demonstrate independence and competence with HEP 2 -4 weeks   MET  2. Patient will report postural awareness and self correction  2-4 weeks. MET    LTG   1. Patient will report improvements with both functional and recreational abilities  4-6 weeks  ONGOING  2. Patient will demonstrate improved motor function  4-6 weeks. Partially MET  3. Endurance / distance to regular walks will improve  4-8 weeks. ONGOING           Assessment: Tolerated treatment well today. She has benefited maximally from PT at this time   She has made some gains towards original goals as noted - she is competent with HEP and will be discharged to such at this time. Plan: discharge skilled PT at this time  thank you for this referral.       Precautions: pain left lateral knee / generalized weakness  Dx with Giant Cell Arteritis   (still managing these symptoms with steroids )     [facility]  Access Code: [email or web address]        POC expires Unit limit Auth Expiration date PT/OT + Visit Limit?   23 3 pending bomn                           Visit/Unit Tracking  AUTH Status:  Date 10/2 10/11 10/18 10/ 25 11/1 11/8 11/15 11/22       pending Used 1 2 3- foto 4 5 6 - foto 7 8 - foto        Remaining                          Manuals 10/2 10/11 10/18 10/25 11/1 11/8 11/15 11/22                                                         Neuro Re-Ed HL TA marching  20x  20x 20x  20x 20x  20x 20x      HL TA w/ roll  :05  20x :05 20x 5s x 20  5s x 20  5s x 20 5s x20 5s x 20      HL TB hip abd  GTB  20x  Gtb 20x Gtb x 20 Btb x 20  Btb x 20 Btb x20 Btb x 20     B TB clamshells  GTB  20x Gtb 20x Gtb x 20  Btb x 20  Btb x 20  Btb x20 Btb x 20                                Ther Ex             bike  6' 6' 7' 7' 7' 7' 7'     ube  5' 5' 5' 5' 5' 5' 5'     aktc 5s x 10 :05  10x :05 10x 5s x 10  5s x 10 5s x 10  5s x 10 5s x 10      ltr 5s x 10 :05  10x :05 10x 5s x 10  5s x 10  5s x 10 5s x10 5s x 10     Fig 4 hip ir/er stretch  :20  3x each :20 3x ea 20x  3 ea  20s x 3ea  20s x 3  20s x3 20s x 3      Bridging w/ tb   GTB  2x  Hamstring cramp GTB 20x 20x  Btb x 20  Btb x 20  Btb x20                                             TB LPD, rows, ext     Tb x 20  ea Gtb x 20 ea  Gtb x 20 ea Gtb x20 ea Btb x 20 ea                  Stand hip abd, hs curls, HR     20x ea 20x ea  20x ea  20x ea 20x ea      Step ups  f/ l     6" x 20 ea bilat 6" x 20 ea bilat 6" x 20 ea  6" x 20 ea 6" x 20 ea      Mini squats @ bar      20x ea 20x ea 20x ea 20x ea                                                                                   Ther Activity                                       Gait Training                                       Modalities normal...

## (undated) DEVICE — ABDUCTION PILLOW FOAM POSITIONER: Brand: CARDINAL HEALTH

## (undated) DEVICE — GLOVE SRG BIOGEL 8

## (undated) DEVICE — 2108 SERIES SAGITTAL BLADE (18.6 X 0.64 X 61.1MM)

## (undated) DEVICE — SUT VICRYL PLUS 1 CTB-1 36 IN VCPB947H

## (undated) DEVICE — DRESSING MEPILEX AG BORDER 4 X 12 IN

## (undated) DEVICE — STOCKINETTE REGULAR

## (undated) DEVICE — HOOD: Brand: FLYTE, SURGICOOL

## (undated) DEVICE — TRAY FOLEY 16FR URIMETER SURESTEP

## (undated) DEVICE — DRAPE EQUIPMENT RF WAND

## (undated) DEVICE — SPONGE PVP SCRUB WING STERILE

## (undated) DEVICE — SUT VICRYL PLUS 2-0 CTB-1 27 IN VCPB259H

## (undated) DEVICE — CAPIT HIP MOP -METAL ON POLY

## (undated) DEVICE — PAD GROUNDING ADULT

## (undated) DEVICE — PENCIL ELECTROSURG E-Z CLEAN -0035H

## (undated) DEVICE — SCD SEQUENTIAL COMPRESSION COMFORT SLEEVE MEDIUM KNEE LENGTH: Brand: KENDALL SCD

## (undated) DEVICE — INTENDED FOR TISSUE SEPARATION, AND OTHER PROCEDURES THAT REQUIRE A SHARP SURGICAL BLADE TO PUNCTURE OR CUT.: Brand: BARD-PARKER SAFETY BLADES SIZE 10, STERILE

## (undated) DEVICE — GLOVE INDICATOR PI UNDERGLOVE SZ 8.5 BLUE

## (undated) DEVICE — CHLORAPREP HI-LITE 26ML ORANGE

## (undated) DEVICE — THE SIMPULSE SOLO SYSTEM WITH ULTREX RETRACTABLE SPLASH SHIELD TIP: Brand: SIMPULSE SOLO

## (undated) DEVICE — BETHLEHEM TOTAL HIP, KIT: Brand: CARDINAL HEALTH